# Patient Record
Sex: FEMALE | Race: WHITE | NOT HISPANIC OR LATINO | Employment: STUDENT | URBAN - METROPOLITAN AREA
[De-identification: names, ages, dates, MRNs, and addresses within clinical notes are randomized per-mention and may not be internally consistent; named-entity substitution may affect disease eponyms.]

---
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
## 2019-01-19 ENCOUNTER — OFFICE VISIT (OUTPATIENT)
Dept: FAMILY MEDICINE | Facility: CLINIC | Age: 19
End: 2019-01-19
Payer: COMMERCIAL

## 2019-01-19 DIAGNOSIS — K29.70 GASTRITIS WITHOUT BLEEDING, UNSPECIFIED CHRONICITY, UNSPECIFIED GASTRITIS TYPE: Primary | ICD-10-CM

## 2019-01-19 RX ORDER — OMEPRAZOLE 40 MG/1
40 CAPSULE, DELAYED RELEASE ORAL 2 TIMES DAILY
Qty: 62 CAPSULE | Refills: 0 | Status: SHIPPED | OUTPATIENT
Start: 2019-01-19 | End: 2019-05-11

## 2019-01-19 NOTE — LETTER
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  She states that she has not had this before, but it started during this game.  It seems to come in waves.  It is very painful when it is happening.  She thinks it might be related to stress as her anxiety has been much higher recently due to issues with her significant other who is not currently in [place] but rather in [place] and having mental health issues of her own.  Lc reports that the apartment she lives in is rather noisy at times with dogs and kids and some other issues and that is disrupting her sleep some too.  She has been taking NSAIDs pretty steadily since the late fall due to a problem with her shoulder.  She has noticed some heartburn and general upsetness to her stomach.  She has not had any vomiting.  No blood in her stools.  This is the first time she has had abdominal pain.  She has never had an ulcer or gastritis, GERD.  She denies any problems with shortness of breath.  She has had no change in her bowel habits.  She ate typical pregame food today and yesterday.  In between these waves of discomfort, she feels perfectly fine.  She denies any fevers or chills.  She does not feel sick otherwise.  Does have a significant history of generalized anxiety disorder.  She was getting treatment with a psychologist at [facility].   She has not been there in a while since they felt like she was doing well.  She has not been interested in taking any medications.  She and another ingrid are sharing time.  There is not one clear starting goalie.  She was somewhat upset since the other goalie started this important game against [place] today.  That goalnohelia performed very well, and we were able to win the game today versus yesterday's game where Lc played and we  lost the game.      OBJECTIVE:   GENERAL:  Pleasant in no apparent distress, somewhat anxious at times.   HEENT:  Negative.   NECK:  No lymphadenopathy.   LUNGS:  Clear to auscultation.   CARDIOVASCULAR:  Regular rate and rhythm.   ABDOMEN:  Soft.  There is a mild tenderness to palpation in the epigastric and left upper quadrant region.  There is no guarding or rebound.  There is no CVA tenderness.      ASSESSMENT:  Probable gastritis versus peptic ulcer disease.        PLAN:  At this time, I have asked Lc to stop all anti-inflammatories.  She assures me she is not drinking any alcohol.  She understands to avoid any spicy foods or citrusy foods or things that may increase her symptoms.  We will start her on omeprazole 40 mg b.i.d.  We will see if this is helping her.  I warned her of any signs of a GI bleed and told her to report this to Stefania Mondragon ATC, and likely we would have her go right to the emergency room.  She has some Tums at home which she will try as well.  We will see how she is doing later next week.  We briefly discussed her anxiety and stress management.  I told her that we should check in with her potentially about this as well at her followup from her belly pain.  Stefania Mondragon ATC, was present for a portion of the appointment.       Maria Fernanda Abreu MD, CAQ, CCD  [facility]  Sports Medicine and Bone Health      Maria Fernanda Abreu MD

## 2019-01-19 NOTE — LETTER
Date:January 24, 2019      Patient was self referred, no letter generated. Do not send.        [facility] Health Information

## 2019-01-22 NOTE — PROGRESS NOTES
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  She states that she has not had this before, but it started during this game.  It seems to come in waves.  It is very painful when it is happening.  She thinks it might be related to stress as her anxiety has been much higher recently due to issues with her significant other who is not currently in [place] but rather in [place] and having mental health issues of her own.  Lc reports that the apartment she lives in is rather noisy at times with dogs and kids and some other issues and that is disrupting her sleep some too.  She has been taking NSAIDs pretty steadily since the late fall due to a problem with her shoulder.  She has noticed some heartburn and general upsetness to her stomach.  She has not had any vomiting.  No blood in her stools.  This is the first time she has had abdominal pain.  She has never had an ulcer or gastritis, GERD.  She denies any problems with shortness of breath.  She has had no change in her bowel habits.  She ate typical pregame food today and yesterday.  In between these waves of discomfort, she feels perfectly fine.  She denies any fevers or chills.  She does not feel sick otherwise.  Does have a significant history of generalized anxiety disorder.  She was getting treatment with a psychologist at [facility].   She has not been there in a while since they felt like she was doing well.  She has not been interested in taking any medications.  She and another ingrid are sharing time.  There is not one clear starting goalie.  She was somewhat upset since the other goalie started this important game against [place] today.  That goalnohelia performed very well, and we were able to win the game today versus yesterday's game where Lc played and we lost the game.      OBJECTIVE:   GENERAL:  Pleasant in no apparent distress, somewhat anxious  at times.   HEENT:  Negative.   NECK:  No lymphadenopathy.   LUNGS:  Clear to auscultation.   CARDIOVASCULAR:  Regular rate and rhythm.   ABDOMEN:  Soft.  There is a mild tenderness to palpation in the epigastric and left upper quadrant region.  There is no guarding or rebound.  There is no CVA tenderness.      ASSESSMENT:  Probable gastritis versus peptic ulcer disease.        PLAN:  At this time, I have asked Lc to stop all anti-inflammatories.  She assures me she is not drinking any alcohol.  She understands to avoid any spicy foods or citrusy foods or things that may increase her symptoms.  We will start her on omeprazole 40 mg b.i.d.  We will see if this is helping her.  I warned her of any signs of a GI bleed and told her to report this to Stefania Mondragon ATC, and likely we would have her go right to the emergency room.  She has some Tums at home which she will try as well.  We will see how she is doing later next week.  We briefly discussed her anxiety and stress management.  I told her that we should check in with her potentially about this as well at her followup from her belly pain.  Stefania Mondragon ATC, was present for a portion of the appointment.       Maria Fernanda Abreu MD, CAQ, CCD  [facility]  Sports Medicine and Bone Health

## 2019-01-23 DIAGNOSIS — M25.511 RIGHT SHOULDER PAIN, UNSPECIFIED CHRONICITY: Primary | ICD-10-CM

## 2019-02-01 ENCOUNTER — OFFICE VISIT (OUTPATIENT)
Dept: FAMILY MEDICINE | Facility: CLINIC | Age: 19
End: 2019-02-01
Payer: COMMERCIAL

## 2019-02-01 VITALS
WEIGHT: 176 LBS | DIASTOLIC BLOOD PRESSURE: 66 MMHG | HEART RATE: 74 BPM | HEIGHT: 68 IN | SYSTOLIC BLOOD PRESSURE: 105 MMHG | BODY MASS INDEX: 26.67 KG/M2

## 2019-02-01 DIAGNOSIS — S43.101S AC SEPARATION, RIGHT, SEQUELA: Primary | ICD-10-CM

## 2019-02-01 ASSESSMENT — MIFFLIN-ST. JEOR: SCORE: 1621.83

## 2019-02-01 NOTE — LETTER
Date:February 4, 2019      Patient was self referred, no letter generated. Do not send.        [facility] Health Information

## 2019-02-01 NOTE — LETTER
"  2/1/2019      RE: Shiloh Cheatham  [address]  [address]  [facility] [address]       HISTORY OF PRESENT ILLNESS  Ms. Cheatham is a pleasant 18 year old year old female who presents to clinic today with right AC joint separation  Shiloh explains that this has happened several times this season and is unable to take nsaids due to gastric distress  Location: right AC joint  Quality:  achy pain    Severity: 5/10 at worst    Duration: months  Timing: occurs intermittently    Modifying factors:  resting and non-use makes it better, movement and use makes it worse  Associated signs & symptoms: some swelling, worse in the morning  Previous similar pain: yes    Additional history: as documented    MEDICAL HISTORY  There is no problem list on file for this patient.      Current Outpatient Medications   Medication Sig Dispense Refill     diclofenac (VOLTAREN) 1 % topical gel Apply 2 grams to shoulder up to 4 times daily 100 g 0     omeprazole (PRILOSEC) 40 MG DR capsule Take 1 capsule (40 mg) by mouth 2 times daily 62 capsule 0     diclofenac (VOLTAREN) 75 MG EC tablet Take 1 tablet (75 mg) by mouth 2 times daily as needed for moderate pain (Patient not taking: Reported on 12/8/2017) 30 tablet 1     sertraline (ZOLOFT) 50 MG tablet Take 1 tablet (50 mg) by mouth daily (Patient not taking: Reported on 12/8/2017) 31 tablet 1       No Known Allergies    No family history on file.    Additional medical/Social/Surgical histories reviewed in [facility] and updated as appropriate.     REVIEW OF SYSTEMS (2/1/2019)  10 point ROS of systems including Constitutional, Eyes, Respiratory, Cardiovascular, Gastroenterology, Genitourinary, Integumentary, Musculoskeletal, Psychiatric were all negative except for pertinent positives noted in my HPI.     PHYSICAL EXAM  Vitals:    02/01/19 0830   BP: 105/66   Pulse: 74   Weight: 79.8 kg (176 lb)   Height: 1.727 m (5' 8\")     Vital Signs: /66   Pulse 74   Ht 1.727 m (5' 8\")   Wt 79.8 kg " (176 lb)   LMP 01/10/2019   BMI 26.76 kg/m    Patient declined being weighed. Body mass index is 26.76 kg/m .    General  - normal appearance, in no obvious distress  CV  - normal radial pulse  Pulm  - normal respiratory pattern, non-labored  Musculoskeletal - right shoulder  - inspection: normal bone and joint alignment, no obvious deformity, no scapular winging, no AC step-off, mild swelling over AC joint, normal clavicle  - palpation: tender over AC joint, clavicle does not displace when pressed  - ROM:  painful passive flexion past 90 deg, painful adduction  - strength: 5/5  strength, 5/5 in all shoulder planes  - special tests:  (+) crossarm  (-) Speed's  (-) Neer  (-) Hawkin's  (-) Mark  (-) McLeod's  (-) apprehension  Neuro  - no sensory or motor deficit, grossly normal coordination, normal muscle tone  Skin  - no ecchymosis, erythema, warmth, or induration, no obvious rash  Psych  - interactive, appropriate, normal mood and affect    ASSESSMENT & PLAN  19 yo female with right AC separation  Reviewed ultrasound: clavicle displaced about 0.5cm superiorly  Will consider lidocaine injection, and will decide if she wants to do for tomorrow prior to game  Cont. voltaren gel and lidocaine patches PRN  Discussed with ATC and Dr Sebastian Ochoa MD, [facility]    José Miguel Ochoa MD

## 2019-02-01 NOTE — PROGRESS NOTES
"HISTORY OF PRESENT ILLNESS  Ms. Cheatham is a pleasant 18 year old year old female who presents to clinic today with right AC joint separation  Shiloh explains that this has happened several times this season and is unable to take nsaids due to gastric distress  Location: right AC joint  Quality:  achy pain    Severity: 5/10 at worst    Duration: months  Timing: occurs intermittently    Modifying factors:  resting and non-use makes it better, movement and use makes it worse  Associated signs & symptoms: some swelling, worse in the morning  Previous similar pain: yes    Additional history: as documented    MEDICAL HISTORY  There is no problem list on file for this patient.      Current Outpatient Medications   Medication Sig Dispense Refill     diclofenac (VOLTAREN) 1 % topical gel Apply 2 grams to shoulder up to 4 times daily 100 g 0     omeprazole (PRILOSEC) 40 MG DR capsule Take 1 capsule (40 mg) by mouth 2 times daily 62 capsule 0     diclofenac (VOLTAREN) 75 MG EC tablet Take 1 tablet (75 mg) by mouth 2 times daily as needed for moderate pain (Patient not taking: Reported on 12/8/2017) 30 tablet 1     sertraline (ZOLOFT) 50 MG tablet Take 1 tablet (50 mg) by mouth daily (Patient not taking: Reported on 12/8/2017) 31 tablet 1       No Known Allergies    No family history on file.    Additional medical/Social/Surgical histories reviewed in [facility] and updated as appropriate.     REVIEW OF SYSTEMS (2/1/2019)  10 point ROS of systems including Constitutional, Eyes, Respiratory, Cardiovascular, Gastroenterology, Genitourinary, Integumentary, Musculoskeletal, Psychiatric were all negative except for pertinent positives noted in my HPI.     PHYSICAL EXAM  Vitals:    02/01/19 0830   BP: 105/66   Pulse: 74   Weight: 79.8 kg (176 lb)   Height: 1.727 m (5' 8\")     Vital Signs: /66   Pulse 74   Ht 1.727 m (5' 8\")   Wt 79.8 kg (176 lb)   LMP 01/10/2019   BMI 26.76 kg/m   Patient declined being weighed. Body mass " index is 26.76 kg/m .    General  - normal appearance, in no obvious distress  CV  - normal radial pulse  Pulm  - normal respiratory pattern, non-labored  Musculoskeletal - right shoulder  - inspection: normal bone and joint alignment, no obvious deformity, no scapular winging, no AC step-off, mild swelling over AC joint, normal clavicle  - palpation: tender over AC joint, clavicle does not displace when pressed  - ROM:  painful passive flexion past 90 deg, painful adduction  - strength: 5/5  strength, 5/5 in all shoulder planes  - special tests:  (+) crossarm  (-) Speed's  (-) Neer  (-) Hawkin's  (-) Mark  (-) Norton's  (-) apprehension  Neuro  - no sensory or motor deficit, grossly normal coordination, normal muscle tone  Skin  - no ecchymosis, erythema, warmth, or induration, no obvious rash  Psych  - interactive, appropriate, normal mood and affect    ASSESSMENT & PLAN  17 yo female with right AC separation  Reviewed ultrasound: clavicle displaced about 0.5cm superiorly  Will consider lidocaine injection, and will decide if she wants to do for tomorrow prior to game  Cont. voltaren gel and lidocaine patches PRN  Discussed with ATC and Dr Sebastian Ochoa MD, CAQSM

## 2019-02-02 ENCOUNTER — OFFICE VISIT (OUTPATIENT)
Dept: ORTHOPEDICS | Facility: CLINIC | Age: 19
End: 2019-02-02
Payer: COMMERCIAL

## 2019-02-02 DIAGNOSIS — M25.511 RIGHT SHOULDER PAIN, UNSPECIFIED CHRONICITY: Primary | ICD-10-CM

## 2019-02-02 NOTE — LETTER
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 She has had persistent right shoulder pain through the last year which has been intermittent in its severity. She has had no further ned instability events - pain is the primary concern. She has had AC injuries over the years and that has been a painful site also of late.      PHYSICAL EXAMINATION:    Young adult female, alert and oriented, in no apparent distress. Seen with her ATC today.    Focused upper extremity exam:  No swelling. Mild prominence at R AC joint. Active right shoulder motion is FE to 175, ER at side to 70 and IR to T12 (with pain). Strength is 5/5 in FE and ER but with pain. Sens intact Ax/Med/Rad/Uln nerves. Motor intact to EPL, FPL, and Intrinsics. Sulcus 1+. Positive Speeds and Positive O'Briens (most significant pain with these tests). Tenderness to palpation over AC joint and over long head biceps groove.     IMAGING:  None new    IMPRESSION:    1. Right shoulder pain  2. Prior known right posterior labral tear  3. Right long head biceps symptoms  4. Right AC joint pain     PLAN:   I discussed with Lc and her ATC her symptoms and her prior MRI findings. Lc has continued to feel her shoulder was best managed non-surgically and would like to continue in this line of treatment through this season. We discussed that we would obtain an updated MRI to guide decision making post-season. We discussed U/S guided cortisone injection for GH+/- AC joints this coming Monday.      Adina Cortes MD

## 2019-02-02 NOTE — LETTER
Date:February 4, 2019      Patient was self referred, no letter generated. Do not send.        [facility] Health Information

## 2019-02-03 NOTE — PROGRESS NOTES
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 She has had persistent right shoulder pain through the last year which has been intermittent in its severity. She has had no further ned instability events - pain is the primary concern. She has had AC injuries over the years and that has been a painful site also of late.      PHYSICAL EXAMINATION:    Young adult female, alert and oriented, in no apparent distress. Seen with her ATC today.    Focused upper extremity exam:  No swelling. Mild prominence at R AC joint. Active right shoulder motion is FE to 175, ER at side to 70 and IR to T12 (with pain). Strength is 5/5 in FE and ER but with pain. Sens intact Ax/Med/Rad/Uln nerves. Motor intact to EPL, FPL, and Intrinsics. Sulcus 1+. Positive Speeds and Positive O'Briens (most significant pain with these tests). Tenderness to palpation over AC joint and over long head biceps groove.     IMAGING:  None new    IMPRESSION:    1. Right shoulder pain  2. Prior known right posterior labral tear  3. Right long head biceps symptoms  4. Right AC joint pain     PLAN:   I discussed with Lc and her ATC her symptoms and her prior MRI findings. Lc has continued to feel her shoulder was best managed non-surgically and would like to continue in this line of treatment through this season. We discussed that we would obtain an updated MRI to guide decision making post-season. We discussed U/S guided cortisone injection for GH+/- AC joints this coming Monday.

## 2019-02-04 ENCOUNTER — OFFICE VISIT (OUTPATIENT)
Dept: FAMILY MEDICINE | Facility: CLINIC | Age: 19
End: 2019-02-04
Payer: COMMERCIAL

## 2019-02-04 VITALS
WEIGHT: 174 LBS | HEART RATE: 80 BPM | SYSTOLIC BLOOD PRESSURE: 121 MMHG | BODY MASS INDEX: 26.37 KG/M2 | DIASTOLIC BLOOD PRESSURE: 76 MMHG | HEIGHT: 68 IN

## 2019-02-04 DIAGNOSIS — M19.011 ARTHROSIS OF RIGHT ACROMIOCLAVICULAR JOINT: Primary | ICD-10-CM

## 2019-02-04 DIAGNOSIS — S43.431S LABRAL TEAR OF SHOULDER, RIGHT, SEQUELA: ICD-10-CM

## 2019-02-04 ASSESSMENT — MIFFLIN-ST. JEOR: SCORE: 1612.63

## 2019-02-04 NOTE — LETTER
Date:February 7, 2019      Patient was self referred, no letter generated. Do not send.        [facility] Health Information

## 2019-02-04 NOTE — PROGRESS NOTES
Shiloh just returned for injections for her GH and AC joints. Will split the concentration and difference between both.    PROCEDURE: right SHOULDER joint injection in AC joint AND Glenohumeral joint  NDC 2774-7276-15 kenalog     The patient was apprised of the risks and the benefits of the procedure and consented and a written consent was signed by the patient.   The patient s shoulder was sterilely prepped with chloraprep   40 mg of triamcinolone suspension was drawn up into a 5 mL syringe with 5 mL of 1% lidocaine   The patient was injected with 3mL of the above solution with a 1.5-inch 22-gauge needle at right posterior gleno-humeral joint using ultrasound, and also then injected into the right AC joint with the other 3mL of the above solution  There were no complications. The patient tolerated the procedure well. There was minimal bleeding.   The patient was instructed to ice the shoulder upon leaving clinic and refrain from overuse over the next 3 days.   The patient was instructed to go to the emergency room with any usual pain, swelling, or redness occurred in the injected area.   The patient was given a followup appointment to evaluate response to the injection to their increased range of motion and reduction of pain.    followup PRN  Dr José Miguel Ochoa

## 2019-02-04 NOTE — LETTER
2/4/2019      RE: Shiloh Cheatham  [address]  [address]  [facility] [address]       Shiloh just returned for injections for her GH and AC joints. Will split the concentration and difference between both.    PROCEDURE: right SHOULDER joint injection in AC joint AND Glenohumeral joint  NDC 5102-0776-82 kenalog     The patient was apprised of the risks and the benefits of the procedure and consented and a written consent was signed by the patient.   The patient s shoulder was sterilely prepped with chloraprep   40 mg of triamcinolone suspension was drawn up into a 5 mL syringe with 5 mL of 1% lidocaine   The patient was injected with 3mL of the above solution with a 1.5-inch 22-gauge needle at right posterior gleno-humeral joint using ultrasound, and also then injected into the right AC joint with the other 3mL of the above solution  There were no complications. The patient tolerated the procedure well. There was minimal bleeding.   The patient was instructed to ice the shoulder upon leaving clinic and refrain from overuse over the next 3 days.   The patient was instructed to go to the emergency room with any usual pain, swelling, or redness occurred in the injected area.   The patient was given a followup appointment to evaluate response to the injection to their increased range of motion and reduction of pain.    followup PRN  Dr José Miguel Ochoa MD

## 2019-02-06 RX ORDER — TRIAMCINOLONE ACETONIDE 40 MG/ML
40 INJECTION, SUSPENSION INTRA-ARTICULAR; INTRAMUSCULAR ONCE
Qty: 1 ML | Refills: 0 | OUTPATIENT
Start: 2019-02-06 | End: 2019-03-29

## 2019-02-18 ENCOUNTER — ANCILLARY PROCEDURE (OUTPATIENT)
Dept: MRI IMAGING | Facility: CLINIC | Age: 19
End: 2019-02-18
Attending: ORTHOPAEDIC SURGERY
Payer: COMMERCIAL

## 2019-02-18 DIAGNOSIS — M25.511 RIGHT SHOULDER PAIN, UNSPECIFIED CHRONICITY: ICD-10-CM

## 2019-02-25 ENCOUNTER — OFFICE VISIT (OUTPATIENT)
Dept: FAMILY MEDICINE | Facility: CLINIC | Age: 19
End: 2019-02-25
Payer: COMMERCIAL

## 2019-02-25 VITALS
SYSTOLIC BLOOD PRESSURE: 115 MMHG | HEART RATE: 96 BPM | DIASTOLIC BLOOD PRESSURE: 72 MMHG | HEIGHT: 68 IN | BODY MASS INDEX: 26.92 KG/M2 | WEIGHT: 177.6 LBS

## 2019-02-25 DIAGNOSIS — S23.41XA SPRAIN OF COSTAL CARTILAGE, INITIAL ENCOUNTER: Primary | ICD-10-CM

## 2019-02-25 ASSESSMENT — MIFFLIN-ST. JEOR: SCORE: 1629.09

## 2019-02-25 NOTE — LETTER
Date:March 26, 2019      Patient was self referred, no letter generated. Do not send.        [facility] Health Information

## 2019-02-25 NOTE — LETTER
"  2/25/2019      RE: Shiloh Cheatham  [address]  [address]  [facility] [address]       HISTORY OF PRESENT ILLNESS  Ms. Cheatham is a pleasant 18 year old year old female who presents to clinic today left rib pain  Did not have a specific injury, may have strained during lifting  Has slowly gotten better over the last 5 days  No bruising  No pain with breathing      MEDICAL HISTORY  There is no problem list on file for this patient.      Current Outpatient Medications   Medication Sig Dispense Refill     diclofenac (VOLTAREN) 1 % topical gel Apply 2 grams to shoulder up to 4 times daily (Patient not taking: Reported on 2/25/2019) 100 g 0     diclofenac (VOLTAREN) 75 MG EC tablet Take 1 tablet (75 mg) by mouth 2 times daily as needed for moderate pain (Patient not taking: Reported on 12/8/2017) 30 tablet 1     omeprazole (PRILOSEC) 40 MG DR capsule Take 1 capsule (40 mg) by mouth 2 times daily (Patient not taking: Reported on 2/25/2019) 62 capsule 0     sertraline (ZOLOFT) 50 MG tablet Take 1 tablet (50 mg) by mouth daily (Patient not taking: Reported on 12/8/2017) 31 tablet 1       No Known Allergies    No family history on file.    Additional medical/Social/Surgical histories reviewed in [facility] and updated as appropriate.     REVIEW OF SYSTEMS (3/25/2019)  10 point ROS of systems including Constitutional, Eyes, Respiratory, Cardiovascular, Gastroenterology, Genitourinary, Integumentary, Musculoskeletal, Psychiatric were all negative except for pertinent positives noted in my HPI.     PHYSICAL EXAM  Vitals:    02/25/19 1611   BP: 115/72   Pulse: 96   Weight: 80.6 kg (177 lb 9.6 oz)   Height: 1.727 m (5' 8\")     Vital Signs: /72   Pulse 96   Ht 1.727 m (5' 8\")   Wt 80.6 kg (177 lb 9.6 oz)   BMI 27.00 kg/m    Patient declined being weighed. Body mass index is 27 kg/m .    General  - normal appearance, in no obvious distress  CV  - normal radial pulse  Pulm  - normal respiratory pattern, " non-labored  Musculoskeletal - left ribs  - inspection: normal bone and joint alignment, no obvious deformity, no scapular winging, no AC step-off  - palpation: no bony or soft tissue tenderness, except along musculature of left lower ribs  - ROM: full rotation ROM with torso, with some discomfort in left lower rib area  - strength: 5/5  strength, 5/5 in all shoulder planes    Neuro  - no sensory or motor deficit, grossly normal coordination, normal muscle tone  Skin  - no ecchymosis, erythema, warmth, or induration, no obvious rash  Psych  - interactive, appropriate, normal mood and affect      ASSESSMENT & PLAN  17 yo female with left rib sprain, improving  nsaids and ice PRN  Rehab given  F/u as needed  Will consider imaging if not improving      José Miguel Ochoa MD, CAQSM    José Miguel Ochoa MD

## 2019-02-28 ENCOUNTER — TELEPHONE (OUTPATIENT)
Dept: ORTHOPEDICS | Facility: CLINIC | Age: 19
End: 2019-02-28

## 2019-02-28 DIAGNOSIS — S43.431D TEAR OF RIGHT GLENOID LABRUM, SUBSEQUENT ENCOUNTER: Primary | ICD-10-CM

## 2019-02-28 NOTE — TELEPHONE ENCOUNTER
Patient is scheduled for surgery with Dr. Cortes      Spoke or left message with: Spoke with Stefania    Date of Surgery: 3/29/19    Location: [facility]    Informed patient they will need an adult  Yes    H&P: Patient to schedule with PCP    Additional imaging/appointments: N/A    Surgery packet: Given in clinic    Additional comments: Patient will await pre admission phone call 1-2 days prior to surgery for arrival time.

## 2019-03-25 ENCOUNTER — OFFICE VISIT (OUTPATIENT)
Dept: FAMILY MEDICINE | Facility: CLINIC | Age: 19
End: 2019-03-25
Payer: COMMERCIAL

## 2019-03-25 VITALS
HEIGHT: 68 IN | SYSTOLIC BLOOD PRESSURE: 118 MMHG | DIASTOLIC BLOOD PRESSURE: 72 MMHG | HEART RATE: 74 BPM | BODY MASS INDEX: 27.04 KG/M2 | WEIGHT: 178.4 LBS

## 2019-03-25 DIAGNOSIS — S43.431S LABRAL TEAR OF SHOULDER, RIGHT, SEQUELA: Primary | ICD-10-CM

## 2019-03-25 DIAGNOSIS — Z01.818 PRE-OP EXAM: ICD-10-CM

## 2019-03-25 ASSESSMENT — MIFFLIN-ST. JEOR: SCORE: 1632.72

## 2019-03-25 NOTE — PROGRESS NOTES
NAME: Shiloh Cheatham      AGE: 18 year old      SEX: female  Chief Complaint/Reason for Procedure: right shoulder labral tear     Indications:  Surgeon:  Sebastian  Date of Surgery: 3/29/19  Location: right shoudler labral tear/biceps tendinitis    HPI: right shoulder labral tear/pain x 2 months    PAST HISTORY  No past surgical history on file.    No past medical history on file.    MEDICATIONS:  Current Outpatient Medications   Medication Sig Dispense Refill     diclofenac (VOLTAREN) 1 % topical gel Apply 2 grams to shoulder up to 4 times daily (Patient not taking: Reported on 2/25/2019) 100 g 0     diclofenac (VOLTAREN) 75 MG EC tablet Take 1 tablet (75 mg) by mouth 2 times daily as needed for moderate pain (Patient not taking: Reported on 12/8/2017) 30 tablet 1     omeprazole (PRILOSEC) 40 MG DR capsule Take 1 capsule (40 mg) by mouth 2 times daily (Patient not taking: Reported on 2/25/2019) 62 capsule 0     sertraline (ZOLOFT) 50 MG tablet Take 1 tablet (50 mg) by mouth daily (Patient not taking: Reported on 12/8/2017) 31 tablet 1       ALLERGIES  Patient has no known allergies.    SOCIAL HISTORY  Social History     Tobacco Use     Smoking status: Never Smoker   Substance Use Topics     Alcohol use: No     Drug use: No       FAMILY HISTORY  No family history on file.    Family History of Anesthesia Reaction: No  Family History of Bleeding Disorder:  No    REVIEW OF SYSTEMS  CONSTITUTIONAL: NEGATIVE for fever, chills, change in weight  EYES: NEGATIVE for vision changes or irritation  ENT/MOUTH: NEGATIVE for ear, mouth and throat problems  ENT/MOUTH: NEGATIVE for ear, mouth and throat problems  RESP: NEGATIVE for significant cough or SOB  RESP:NEGATIVE for significant cough or SOB  CV: NEGATIVE for chest pain, palpitations or peripheral edema  GI: NEGATIVE for nausea, abdominal pain, heartburn, or change in bowel habits  NEURO: NEGATIVE for weakness, dizziness or paresthesias  ENDOCRINE: NEGATIVE for temperature  "intolerance, skin/hair changes    PHYSICAL EXAM  /72   Pulse 74   Ht 1.727 m (5' 8\")   Wt 80.9 kg (178 lb 6.4 oz)   BMI 27.13 kg/m    General Appearance: Normal  Skin:  Normal  Head:  Normal  Eyes: Normal  Ears: Normal  Nose: Normal  Mouth/Teeth: Normal  Throat: Normal  Neck: Normal  Chest/Lungs: Normal  Heart/Vascular: Normal  Abdomen: Normal  Lymphoid: Normal  Blood Vessels: Normal  Neuromuscular: Normal  Other: Normal      Assessment: 19 yo female with right shoulder labral tear, biceps tendinitis    PLAN  Cleared for anesthesia    This patient has been examined by me this day and has been found to be an acceptable candidate for surgery with apppropriate anesthesia.  José Miguel Ochoa   3/25/2019        "

## 2019-03-25 NOTE — LETTER
Date:March 26, 2019      Patient was self referred, no letter generated. Do not send.        [facility] Health Information

## 2019-03-25 NOTE — LETTER
3/25/2019      RE: Shiloh Cheatham  [address]  [address]  [facility] [address]       NAME: Shiloh Cheatham      AGE: 18 year old      SEX: female  Chief Complaint/Reason for Procedure: right shoulder labral tear     Indications:  Surgeon:  Sebastian  Date of Surgery: 3/29/19  Location: right shoudler labral tear/biceps tendinitis    HPI: right shoulder labral tear/pain x 2 months    PAST HISTORY  No past surgical history on file.    No past medical history on file.    MEDICATIONS:  Current Outpatient Medications   Medication Sig Dispense Refill     diclofenac (VOLTAREN) 1 % topical gel Apply 2 grams to shoulder up to 4 times daily (Patient not taking: Reported on 2/25/2019) 100 g 0     diclofenac (VOLTAREN) 75 MG EC tablet Take 1 tablet (75 mg) by mouth 2 times daily as needed for moderate pain (Patient not taking: Reported on 12/8/2017) 30 tablet 1     omeprazole (PRILOSEC) 40 MG DR capsule Take 1 capsule (40 mg) by mouth 2 times daily (Patient not taking: Reported on 2/25/2019) 62 capsule 0     sertraline (ZOLOFT) 50 MG tablet Take 1 tablet (50 mg) by mouth daily (Patient not taking: Reported on 12/8/2017) 31 tablet 1       ALLERGIES  Patient has no known allergies.    SOCIAL HISTORY  Social History     Tobacco Use     Smoking status: Never Smoker   Substance Use Topics     Alcohol use: No     Drug use: No       FAMILY HISTORY  No family history on file.    Family History of Anesthesia Reaction: No  Family History of Bleeding Disorder:  No    REVIEW OF SYSTEMS  CONSTITUTIONAL: NEGATIVE for fever, chills, change in weight  EYES: NEGATIVE for vision changes or irritation  ENT/MOUTH: NEGATIVE for ear, mouth and throat problems  ENT/MOUTH: NEGATIVE for ear, mouth and throat problems  RESP: NEGATIVE for significant cough or SOB  RESP:NEGATIVE for significant cough or SOB  CV: NEGATIVE for chest pain, palpitations or peripheral edema  GI: NEGATIVE for nausea, abdominal pain, heartburn, or change in bowel  "habits  NEURO: NEGATIVE for weakness, dizziness or paresthesias  ENDOCRINE: NEGATIVE for temperature intolerance, skin/hair changes    PHYSICAL EXAM  /72   Pulse 74   Ht 1.727 m (5' 8\")   Wt 80.9 kg (178 lb 6.4 oz)   BMI 27.13 kg/m     General Appearance: Normal  Skin:  Normal  Head:  Normal  Eyes: Normal  Ears: Normal  Nose: Normal  Mouth/Teeth: Normal  Throat: Normal  Neck: Normal  Chest/Lungs: Normal  Heart/Vascular: Normal  Abdomen: Normal  Lymphoid: Normal  Blood Vessels: Normal  Neuromuscular: Normal  Other: Normal      Assessment: 17 yo female with right shoulder labral tear, biceps tendinitis    PLAN  Cleared for anesthesia    This patient has been examined by me this day and has been found to be an acceptable candidate for surgery with apppropriate anesthesia.  José Miguel Ochoa   3/25/2019          José Miguel Ochoa MD    "

## 2019-03-25 NOTE — PROGRESS NOTES
"HISTORY OF PRESENT ILLNESS  Ms. Cheatham is a pleasant 18 year old year old female who presents to clinic today left rib pain  Did not have a specific injury, may have strained during lifting  Has slowly gotten better over the last 5 days  No bruising  No pain with breathing      MEDICAL HISTORY  There is no problem list on file for this patient.      Current Outpatient Medications   Medication Sig Dispense Refill     diclofenac (VOLTAREN) 1 % topical gel Apply 2 grams to shoulder up to 4 times daily (Patient not taking: Reported on 2/25/2019) 100 g 0     diclofenac (VOLTAREN) 75 MG EC tablet Take 1 tablet (75 mg) by mouth 2 times daily as needed for moderate pain (Patient not taking: Reported on 12/8/2017) 30 tablet 1     omeprazole (PRILOSEC) 40 MG DR capsule Take 1 capsule (40 mg) by mouth 2 times daily (Patient not taking: Reported on 2/25/2019) 62 capsule 0     sertraline (ZOLOFT) 50 MG tablet Take 1 tablet (50 mg) by mouth daily (Patient not taking: Reported on 12/8/2017) 31 tablet 1       No Known Allergies    No family history on file.    Additional medical/Social/Surgical histories reviewed in [employer] and updated as appropriate.     REVIEW OF SYSTEMS (3/25/2019)  10 point ROS of systems including Constitutional, Eyes, Respiratory, Cardiovascular, Gastroenterology, Genitourinary, Integumentary, Musculoskeletal, Psychiatric were all negative except for pertinent positives noted in my HPI.     PHYSICAL EXAM  Vitals:    02/25/19 1611   BP: 115/72   Pulse: 96   Weight: 80.6 kg (177 lb 9.6 oz)   Height: 1.727 m (5' 8\")     Vital Signs: /72   Pulse 96   Ht 1.727 m (5' 8\")   Wt 80.6 kg (177 lb 9.6 oz)   BMI 27.00 kg/m   Patient declined being weighed. Body mass index is 27 kg/m .    General  - normal appearance, in no obvious distress  CV  - normal radial pulse  Pulm  - normal respiratory pattern, non-labored  Musculoskeletal - left ribs  - inspection: normal bone and joint alignment, no obvious deformity, " no scapular winging, no AC step-off  - palpation: no bony or soft tissue tenderness, except along musculature of left lower ribs  - ROM: full rotation ROM with torso, with some discomfort in left lower rib area  - strength: 5/5  strength, 5/5 in all shoulder planes    Neuro  - no sensory or motor deficit, grossly normal coordination, normal muscle tone  Skin  - no ecchymosis, erythema, warmth, or induration, no obvious rash  Psych  - interactive, appropriate, normal mood and affect      ASSESSMENT & PLAN  17 yo female with left rib sprain, improving  nsaids and ice PRN  Rehab given  F/u as needed  Will consider imaging if not improving      José Miguel Ochoa MD, CAQSM

## 2019-03-28 ENCOUNTER — ANESTHESIA EVENT (OUTPATIENT)
Dept: SURGERY | Facility: AMBULATORY SURGERY CENTER | Age: 19
End: 2019-03-28

## 2019-03-28 ASSESSMENT — LIFESTYLE VARIABLES: TOBACCO_USE: 0

## 2019-03-28 NOTE — ANESTHESIA PREPROCEDURE EVALUATION
"Anesthesia Pre-Procedure Evaluation    Patient: Shiloh Cheatham   MRN:     [id number] Gender:   female   Age:    18 year old :      2000        Preoperative Diagnosis: Right Labral Tear   Procedure(s):  Right Shoulder Arthroscopic Labral Repair     No past medical history on file.   No past surgical history on file.       Anesthesia Evaluation     . Pt has not had prior anesthetic            ROS/MED HX    ENT/Pulmonary:  - neg pulmonary ROS    (-) tobacco use   Neurologic:  - neg neurologic ROS     Cardiovascular:  - neg cardiovascular ROS       METS/Exercise Tolerance:     Hematologic:  - neg hematologic  ROS       Musculoskeletal: Comment: Right Labral Tear        GI/Hepatic:  - neg GI/hepatic ROS       Renal/Genitourinary:  - ROS Renal section negative       Endo:  - neg endo ROS       Psychiatric:  - neg psychiatric ROS       Infectious Disease:  - neg infectious disease ROS       Malignancy:      - no malignancy   Other:    - neg other ROS                 JZG FV AN PHYSICAL EXAM    No results found for: WBC, HGB, HCT, PLT, CRP, SED, NA, POTASSIUM, CHLORIDE, CO2, BUN, CR, GLC, SOCRATES, PHOS, MAG, ALBUMIN, PROTTOTAL, ALT, AST, GGT, ALKPHOS, BILITOTAL, BILIDIRECT, LIPASE, AMYLASE, MATTIE, PTT, INR, FIBR, TSH, T4, T3, HCG, HCGS, CKTOTAL, CKMB, TROPN    Preop Vitals  BP Readings from Last 3 Encounters:   19 118/72   19 115/72   19 121/76    Pulse Readings from Last 3 Encounters:   19 74   19 96   19 80      Resp Readings from Last 3 Encounters:   17 16    SpO2 Readings from Last 3 Encounters:   No data found for SpO2      Temp Readings from Last 1 Encounters:   No data found for Temp    Ht Readings from Last 1 Encounters:   19 1.727 m (5' 8\") (93 %)*     * Growth percentiles are based on CDC (Girls, 2-20 Years) data.      Wt Readings from Last 1 Encounters:   19 80.9 kg (178 lb 6.4 oz) (94 %)*     * Growth percentiles are based on CDC (Girls, 2-20 Years) " "data.    Estimated body mass index is 27.13 kg/m  as calculated from the following:    Height as of 3/25/19: 1.727 m (5' 8\").    Weight as of 3/25/19: 80.9 kg (178 lb 6.4 oz).     LDA:            Assessment:   ASA SCORE: 1       Documentation: H&P complete; Preop Testing complete; Consents complete   Proceeding: Proceed without further delay  Tobacco Use:  NO Active use of Tobacco/UNKNOWN Tobacco use status     Plan:   Anes. Type:  Regional; MAC     RA Details:  Pre Induction; Exparel; SS; FOR POSTOP PAIN CONTROL; R   Pre-Induction: Midazolam IV; Opioid IV; Acetaminophen PO   Induction:  IV (Standard)   Airway: Native Airway   Access/Monitoring: PIV   Maintenance: Propofol; IV   Emergence: Procedure Site   Logistics: Same Day Surgery     Postop Pain/Sedation Strategy:  Standard-Options: Opioids PRN; Exparel; Block SS     PONV Management:  Adult Risk Factors: Female, Non-Smoker, Postop Opioids  Prevention: Propofol Infusion; Ondansetron     CONSENT:      Blood Products: Consent Deferred (Minimal Blood Loss)       Comments for Plan/Consent:  17 yo for Right Shoulder Arthroscopic Labral Repair (Right Shoulder) under MAC/RA                         Sonny Nam MD  "

## 2019-03-29 ENCOUNTER — HOSPITAL ENCOUNTER (OUTPATIENT)
Facility: AMBULATORY SURGERY CENTER | Age: 19
End: 2019-03-29
Attending: ORTHOPAEDIC SURGERY
Payer: COMMERCIAL

## 2019-03-29 ENCOUNTER — ANESTHESIA (OUTPATIENT)
Dept: SURGERY | Facility: AMBULATORY SURGERY CENTER | Age: 19
End: 2019-03-29

## 2019-03-29 VITALS
HEIGHT: 68 IN | OXYGEN SATURATION: 96 % | TEMPERATURE: 97.6 F | HEART RATE: 88 BPM | WEIGHT: 175 LBS | RESPIRATION RATE: 14 BRPM | BODY MASS INDEX: 26.52 KG/M2 | DIASTOLIC BLOOD PRESSURE: 73 MMHG | SYSTOLIC BLOOD PRESSURE: 126 MMHG

## 2019-03-29 DIAGNOSIS — S43.431A TEAR OF RIGHT GLENOID LABRUM, INITIAL ENCOUNTER: Primary | ICD-10-CM

## 2019-03-29 DEVICE — IMP SCR ARTHREX 2.4MM BIOCOMPOSITE SUTURETAK AR-1934BCF-24: Type: IMPLANTABLE DEVICE | Site: SHOULDER | Status: FUNCTIONAL

## 2019-03-29 DEVICE — IMP ANCHOR ARTHREX BIOCOMP PUSHLOCK 2.9X12.5MM AR-2923BC: Type: IMPLANTABLE DEVICE | Site: SHOULDER | Status: FUNCTIONAL

## 2019-03-29 RX ORDER — SODIUM CHLORIDE, SODIUM LACTATE, POTASSIUM CHLORIDE, CALCIUM CHLORIDE 600; 310; 30; 20 MG/100ML; MG/100ML; MG/100ML; MG/100ML
INJECTION, SOLUTION INTRAVENOUS CONTINUOUS
Status: DISCONTINUED | OUTPATIENT
Start: 2019-03-29 | End: 2019-03-30 | Stop reason: HOSPADM

## 2019-03-29 RX ORDER — GABAPENTIN 300 MG/1
300 CAPSULE ORAL ONCE
Status: COMPLETED | OUTPATIENT
Start: 2019-03-29 | End: 2019-03-29

## 2019-03-29 RX ORDER — FENTANYL CITRATE 50 UG/ML
25-50 INJECTION, SOLUTION INTRAMUSCULAR; INTRAVENOUS
Status: DISCONTINUED | OUTPATIENT
Start: 2019-03-29 | End: 2019-03-29 | Stop reason: HOSPADM

## 2019-03-29 RX ORDER — MEPERIDINE HYDROCHLORIDE 25 MG/ML
12.5 INJECTION INTRAMUSCULAR; INTRAVENOUS; SUBCUTANEOUS
Status: DISCONTINUED | OUTPATIENT
Start: 2019-03-29 | End: 2019-03-30 | Stop reason: HOSPADM

## 2019-03-29 RX ORDER — ONDANSETRON 4 MG/1
4 TABLET, ORALLY DISINTEGRATING ORAL EVERY 30 MIN PRN
Status: DISCONTINUED | OUTPATIENT
Start: 2019-03-29 | End: 2019-03-30 | Stop reason: HOSPADM

## 2019-03-29 RX ORDER — NALOXONE HYDROCHLORIDE 0.4 MG/ML
.1-.4 INJECTION, SOLUTION INTRAMUSCULAR; INTRAVENOUS; SUBCUTANEOUS
Status: DISCONTINUED | OUTPATIENT
Start: 2019-03-29 | End: 2019-03-30 | Stop reason: HOSPADM

## 2019-03-29 RX ORDER — LIDOCAINE 40 MG/G
CREAM TOPICAL
Status: DISCONTINUED | OUTPATIENT
Start: 2019-03-29 | End: 2019-03-29 | Stop reason: HOSPADM

## 2019-03-29 RX ORDER — KETOROLAC TROMETHAMINE 30 MG/ML
INJECTION, SOLUTION INTRAMUSCULAR; INTRAVENOUS PRN
Status: DISCONTINUED | OUTPATIENT
Start: 2019-03-29 | End: 2019-03-29

## 2019-03-29 RX ORDER — DEXAMETHASONE SODIUM PHOSPHATE 4 MG/ML
INJECTION, SOLUTION INTRA-ARTICULAR; INTRALESIONAL; INTRAMUSCULAR; INTRAVENOUS; SOFT TISSUE PRN
Status: DISCONTINUED | OUTPATIENT
Start: 2019-03-29 | End: 2019-03-29

## 2019-03-29 RX ORDER — ONDANSETRON 2 MG/ML
4 INJECTION INTRAMUSCULAR; INTRAVENOUS EVERY 30 MIN PRN
Status: DISCONTINUED | OUTPATIENT
Start: 2019-03-29 | End: 2019-03-30 | Stop reason: HOSPADM

## 2019-03-29 RX ORDER — ONDANSETRON 4 MG/1
4-8 TABLET, ORALLY DISINTEGRATING ORAL EVERY 8 HOURS PRN
Qty: 4 TABLET | Refills: 0 | Status: SHIPPED | OUTPATIENT
Start: 2019-03-29 | End: 2019-05-11

## 2019-03-29 RX ORDER — PROPOFOL 10 MG/ML
INJECTION, EMULSION INTRAVENOUS PRN
Status: DISCONTINUED | OUTPATIENT
Start: 2019-03-29 | End: 2019-03-29

## 2019-03-29 RX ORDER — FLUMAZENIL 0.1 MG/ML
0.2 INJECTION, SOLUTION INTRAVENOUS
Status: DISCONTINUED | OUTPATIENT
Start: 2019-03-29 | End: 2019-03-29 | Stop reason: HOSPADM

## 2019-03-29 RX ORDER — OXYCODONE HYDROCHLORIDE 5 MG/1
5 TABLET ORAL
Status: DISCONTINUED | OUTPATIENT
Start: 2019-03-29 | End: 2019-03-30 | Stop reason: HOSPADM

## 2019-03-29 RX ORDER — NALOXONE HYDROCHLORIDE 0.4 MG/ML
.1-.4 INJECTION, SOLUTION INTRAMUSCULAR; INTRAVENOUS; SUBCUTANEOUS
Status: DISCONTINUED | OUTPATIENT
Start: 2019-03-29 | End: 2019-03-29 | Stop reason: HOSPADM

## 2019-03-29 RX ORDER — SODIUM CHLORIDE, SODIUM LACTATE, POTASSIUM CHLORIDE, CALCIUM CHLORIDE 600; 310; 30; 20 MG/100ML; MG/100ML; MG/100ML; MG/100ML
INJECTION, SOLUTION INTRAVENOUS CONTINUOUS
Status: DISCONTINUED | OUTPATIENT
Start: 2019-03-29 | End: 2019-03-29 | Stop reason: HOSPADM

## 2019-03-29 RX ORDER — AMOXICILLIN 250 MG
1-2 CAPSULE ORAL 2 TIMES DAILY
Qty: 30 TABLET | Refills: 0 | Status: SHIPPED | OUTPATIENT
Start: 2019-03-29 | End: 2019-05-11

## 2019-03-29 RX ORDER — CEFAZOLIN SODIUM 1 G/50ML
1 SOLUTION INTRAVENOUS SEE ADMIN INSTRUCTIONS
Status: DISCONTINUED | OUTPATIENT
Start: 2019-03-29 | End: 2019-03-29 | Stop reason: HOSPADM

## 2019-03-29 RX ORDER — OXYCODONE HYDROCHLORIDE 5 MG/1
5-10 TABLET ORAL EVERY 4 HOURS PRN
Qty: 30 TABLET | Refills: 0 | Status: SHIPPED | OUTPATIENT
Start: 2019-03-29 | End: 2019-05-01

## 2019-03-29 RX ORDER — ACETAMINOPHEN 325 MG/1
650 TABLET ORAL EVERY 4 HOURS PRN
Qty: 50 TABLET | Refills: 0 | Status: SHIPPED | OUTPATIENT
Start: 2019-03-29 | End: 2023-02-01

## 2019-03-29 RX ORDER — ACETAMINOPHEN 325 MG/1
650 TABLET ORAL
Status: DISCONTINUED | OUTPATIENT
Start: 2019-03-29 | End: 2019-03-30 | Stop reason: HOSPADM

## 2019-03-29 RX ORDER — BUPIVACAINE HYDROCHLORIDE 5 MG/ML
INJECTION, SOLUTION EPIDURAL; INTRACAUDAL PRN
Status: DISCONTINUED | OUTPATIENT
Start: 2019-03-29 | End: 2019-03-29

## 2019-03-29 RX ORDER — ONDANSETRON 2 MG/ML
INJECTION INTRAMUSCULAR; INTRAVENOUS PRN
Status: DISCONTINUED | OUTPATIENT
Start: 2019-03-29 | End: 2019-03-29

## 2019-03-29 RX ORDER — ONDANSETRON 4 MG/1
4 TABLET, ORALLY DISINTEGRATING ORAL
Status: DISCONTINUED | OUTPATIENT
Start: 2019-03-29 | End: 2019-03-30 | Stop reason: HOSPADM

## 2019-03-29 RX ORDER — PROPOFOL 10 MG/ML
INJECTION, EMULSION INTRAVENOUS CONTINUOUS PRN
Status: DISCONTINUED | OUTPATIENT
Start: 2019-03-29 | End: 2019-03-29

## 2019-03-29 RX ORDER — CEFAZOLIN SODIUM 2 G/50ML
2 SOLUTION INTRAVENOUS
Status: COMPLETED | OUTPATIENT
Start: 2019-03-29 | End: 2019-03-29

## 2019-03-29 RX ORDER — KETOROLAC TROMETHAMINE 10 MG/1
10 TABLET, FILM COATED ORAL EVERY 8 HOURS
Qty: 9 TABLET | Refills: 0 | Status: SHIPPED | OUTPATIENT
Start: 2019-03-29 | End: 2019-04-01

## 2019-03-29 RX ORDER — LIDOCAINE HYDROCHLORIDE 20 MG/ML
INJECTION, SOLUTION INFILTRATION; PERINEURAL PRN
Status: DISCONTINUED | OUTPATIENT
Start: 2019-03-29 | End: 2019-03-29

## 2019-03-29 RX ORDER — OXYCODONE HYDROCHLORIDE 5 MG/1
5 TABLET ORAL EVERY 4 HOURS PRN
Status: DISCONTINUED | OUTPATIENT
Start: 2019-03-29 | End: 2019-03-30 | Stop reason: HOSPADM

## 2019-03-29 RX ORDER — ACETAMINOPHEN 325 MG/1
975 TABLET ORAL ONCE
Status: COMPLETED | OUTPATIENT
Start: 2019-03-29 | End: 2019-03-29

## 2019-03-29 RX ADMIN — FENTANYL CITRATE 50 MCG: 50 INJECTION, SOLUTION INTRAMUSCULAR; INTRAVENOUS at 07:03

## 2019-03-29 RX ADMIN — PROPOFOL 200 MG: 10 INJECTION, EMULSION INTRAVENOUS at 07:21

## 2019-03-29 RX ADMIN — CEFAZOLIN SODIUM 2 G: 2 SOLUTION INTRAVENOUS at 07:40

## 2019-03-29 RX ADMIN — KETOROLAC TROMETHAMINE 30 MG: 30 INJECTION, SOLUTION INTRAMUSCULAR; INTRAVENOUS at 08:58

## 2019-03-29 RX ADMIN — SODIUM CHLORIDE, SODIUM LACTATE, POTASSIUM CHLORIDE, CALCIUM CHLORIDE: 600; 310; 30; 20 INJECTION, SOLUTION INTRAVENOUS at 06:40

## 2019-03-29 RX ADMIN — ACETAMINOPHEN 975 MG: 325 TABLET ORAL at 06:23

## 2019-03-29 RX ADMIN — BUPIVACAINE HYDROCHLORIDE 10 ML: 5 INJECTION, SOLUTION EPIDURAL; INTRACAUDAL at 07:07

## 2019-03-29 RX ADMIN — DEXAMETHASONE SODIUM PHOSPHATE 4 MG: 4 INJECTION, SOLUTION INTRA-ARTICULAR; INTRALESIONAL; INTRAMUSCULAR; INTRAVENOUS; SOFT TISSUE at 07:31

## 2019-03-29 RX ADMIN — PROPOFOL: 10 INJECTION, EMULSION INTRAVENOUS at 08:52

## 2019-03-29 RX ADMIN — ONDANSETRON 4 MG: 2 INJECTION INTRAMUSCULAR; INTRAVENOUS at 08:58

## 2019-03-29 RX ADMIN — GABAPENTIN 300 MG: 300 CAPSULE ORAL at 06:23

## 2019-03-29 RX ADMIN — PROPOFOL 200 MCG/KG/MIN: 10 INJECTION, EMULSION INTRAVENOUS at 07:22

## 2019-03-29 RX ADMIN — LIDOCAINE HYDROCHLORIDE 80 MG: 20 INJECTION, SOLUTION INFILTRATION; PERINEURAL at 07:21

## 2019-03-29 ASSESSMENT — MIFFLIN-ST. JEOR: SCORE: 1617.29

## 2019-03-29 NOTE — ANESTHESIA CARE TRANSFER NOTE
Patient: Shiloh Cheatham    Procedure(s):  Right Shoulder Arthroscopic Labral Repair    Diagnosis: Right Labral Tear  Diagnosis Additional Information: No value filed.    Anesthesia Type:   General, LMA, Peripheral Nerve Block for post-op pain at surgeon's request     Note:  Airway :Nasal Cannula  Patient transferred to:PACU  Comments: VSS and WNL, comfortable, no PONV, report to Concha MANTILLAHandoff Report: Identifed the Patient, Identified the Reponsible Provider, Reviewed the pertinent medical history, Discussed the surgical course, Reviewed Intra-OP anesthesia mangement and issues during anesthesia, Set expectations for post-procedure period and Allowed opportunity for questions and acknowledgement of understanding      Vitals: (Last set prior to Anesthesia Care Transfer)    CRNA VITALS  3/29/2019 0845 - 3/29/2019 0922      3/29/2019             Pulse:  85    SpO2:  100 %    Resp Rate (set):  10                Electronically Signed By: NAEEM Kwok CRNA  March 29, 2019  9:22 AM

## 2019-03-29 NOTE — ANESTHESIA PROCEDURE NOTES
Peripheral Nerve Block Procedure Note    Staff:     Anesthesiologist:  Fan Solo MD  Procedure Start/Stop TImes:      3/29/2019 6:55 AM     3/29/2019 7:04 AM    patient identified, IV checked, site marked, risks and benefits discussed, informed consent, monitors and equipment checked, pre-op evaluation, at physician/surgeon's request and post-op pain management      Correct Patient: Yes      Correct Position: Yes      Correct Site: Yes      Correct Procedure: Yes      Correct Laterality:  Yes    Site Marked:  Yes  Procedure details:     Procedure:  Interscalene    Laterality:  Right    Position:  Supine and Sitting    Sterile Prep: chloraprep, mask and sterile gloves      Local skin infiltration:  None    Needle:  Short bevel    Needle gauge:  21    Needle length (inches):  4    Ultrasound: Yes      Ultrasound used to identify targeted nerve, plexus, or vascular structure and placed a needle adjacent to it      Permanent Image entered into patiient's record      Abnormal pain on injection: No      Blood Aspirated: No      Paresthesias:  No    Bleeding at site: No      Test dose negative for signs of intravascular injection: Yes      Bolus via:  Needle    Infusion Method:  Single Shot    Complications:  None  Assessment/Narrative:     Injection made incrementally with aspirations every (mL):  5

## 2019-03-29 NOTE — BRIEF OP NOTE
[facility]    Orthopaedic Surgery  Brief Operative Note    Pre-operative diagnosis: Right Shoulder Labral Tear   Post-operative diagnosis Same    Procedure: Procedure(s):  Right Shoulder Arthroscopic Labral Repair   Surgeon: Adina Cortes MD   Assistants(s): Artie Agudelo MD   Anesthesia: Other    Estimated blood loss: Minimal   Total IV fluids: (See anesthesia record)   Blood transfusion: (See anesthesia record)   Total urine output: (See anesthesia record)   Drains: None   Specimens: * No specimens in log *   Findings: Multidirectional instability (1-2+) anterior and posterior; Anterior and Posterior inferior labral tear with atrophic inferior labrum.   Complications: None   Implants: Arthrex 2.4 Suturetak x 4 (anterior and posterior-inferior), 2.9 Pushlock x 2 (posterior)

## 2019-03-29 NOTE — OR NURSING
Preop Exparel right interscalene nerve block done by anesthesia. Patient tolerated procedure well, VSS throughout. Versed 1mg and Fentanyl 50mcg given.

## 2019-03-29 NOTE — DISCHARGE INSTRUCTIONS
"[facility]  Home Care Following Anesthesia  For 24 hours after surgery:  1. Get plenty of rest.  A responsible adult must stay with you for at least 24 hours after you leave the surgery center.  2. Do not drive or use heavy equipment.  If you have weakness or tingling, don't drive or use heavy equipment until this feeling goes away.   3. Do not drink alcohol.   4. Avoid strenuous or risky activities.  Ask for help when climbing stairs.  5. You may feel lightheaded.  IF so, sit for a few minutes before standing.  Have someone help you get up.   6. If you have nausea (feel sick to your stomach): Drink only clear liquids such as apple juice, ginger ale, broth or 7-Up.  Rest may also help.  Be sure to drink enough fluids.  Move to a regular diet as you feel able.   7. You may have a slight fever.  Call the doctor if your fever is over 100 F (37.7 C) (taken under the tongue) or lasts longer than 24 hours.  8. You may have a dry mouth, a sore throat, muscle aches or trouble sleeping. These should go away after 24 hours.  9. Do not make important or legal decisions.   If you use hormonal birth control (such as the pill, patch, ring or implants):  You will need a second form of birth control for 7 days (condoms, a diaphragm or contraceptive foam).  While in the surgery center, you received a medicine called Sugammadex.  Hormonal birth control (such as the pill, patch, ring or implants) will not work as well for a week after taking this medicine.  Today you received an Exparel block to numb the nerves near your surgery site.  This is a block using local anesthetic or \"numbing\" medication injected around the nerves to anesthetize or \"numb\" the area supplied by those nerves.  This block is injected into the muscle layer near your surgical site.  This medication may numb the location where you had surgery up to 72 hours.  If your surgical site is an arm or leg you should be careful with " your affected limb, since it is possible to injure your limb without being aware of it due to the numbing.  Until full feeling returns, you should guard against bumping or hitting your limb, and avoid extreme hot or cold temperatures on the skin.  As the block wears off, the feeling will return as a tingling or prickly sensation near your surgical site.  You will experince more discomfort from your incision as the feeling returns.  You may want to take a pain pill (a narcotic or Tylenol if this was prescribed by your surgeon) when you start to experience mild pain before the pain beomes more severe.  If your pain medications do not control your pain, you should notify your surgeon.    Tips for taking pain medications  To get the best pain relief possible, remember these points:    Take pain medications as directed, before pain becomes severe.    Pain medication can upset your stomach: taking it with food may help.    Constipation is a common side effect of pain medication. Drink plenty of  fluids.    Eat foods high in fiber. Take a stool softener if recommended by your doctor or pharmacist.    Do not drink alcohol, drive or operate machinery while taking pain medications.    Ask about other ways to control pain, such as with heat, ice or relaxation.    Tylenol/Acetaminophen Consumption  To help encourage the safe use of acetaminophen, the makers of TYLENOL  have lowered the maximum daily dose for single-ingredient Extra Strength TYLENOL  (acetaminophen) products sold in the [place] from 8 pills per day (4,000 mg) to 6 pills per day (3,000 mg). The dosing interval has also changed from 2 pills every 4-6 hours to 2 pills every 6 hours.    If you feel your pain relief is insufficient, you may take Tylenol/Acetaminophen in addition to your narcotic pain medication.     Be careful not to exceed 3,000 mg of Tylenol/Acetaminophen in a 24 hour period from all sources.    If you are taking extra strength Tylenol/acetaminophen  (500 mg), the maximum dose is 6 tablets in 24 hours.    If you are taking regular strength acetaminophen (325 mg), the maximum dose is 9 tablets in 24 hours.    Call a doctor for any of the followin. Signs of infection (fever, growing tenderness at the surgery site, a large amount of drainage or bleeding, severe pain, foul-smelling drainage, redness, swelling).  2. It has been over 8 to 10 hours since surgery and you are still not able to urinate (pass water).  3. Headache for over 24 hours.  4. Numbness, tingling or weakness the day after surgery (if you had spinal anesthesia).  Your doctor is:  Dr. Adina Cortes, Orthopaedics: [phone]                    Or dial [phone] and ask for the resident on call for:  Orthopaedics  For emergency care, call the:  [facility] [place], [place] Emergency Department: [phone] (TTY for hearing impaired: [phone])            Post Operative Instructions: Regional Anesthetic for Upper Extremity    General Information:   Regional anesthesia is when local anesthetic or  numbing  medication is injected around the nerves to anesthetize or  numb  the area supplied by that set of nerves.      Types of Regional Blocks:  Interscalene: A block injected into the neck on the operative shoulder/arm of a patient having shoulder surgery  Supraclavicular: A block injected near the clavicle on the operative shoulder of a patient having elbow, forearm, or hand surgery    Procedure:  The type of anesthesia your doctor used to numb your shoulder or arm will usually not wear off for 6-18 hours, but may last as long as 24 hours. You should be careful during that period, since it is possible to injure your arm without being aware of the injury. While your arm is numb, you should:    Avoid striking or bumping your arm    Avoid extreme hot or cold    Diet:  There are no restrictions on your diet. You should drink plenty of fluids.     Discomfort:  You will have a tingling and prickly sensation in your  arm as the feeling begins to return. You can also expect some discomfort. The amount of discomfort is unpredictable, but if you have more pain than can be controlled with pain medication you should notify your physician.     Pain Medicine:   Begin taking your oral pain pills (if you have not already done so) before bedtime and during the night to avoid a sudden onset of pain as the block wears off.  Do not engage in drinking, driving, or hazardous occupations while taking pain medication.     Stitches:   You may have stitches or special skin closures. You doctor will inform you when to return to the office to have them removed.     Activity:  On the day of surgery you should try to stay in bed with your hand elevated on pillows. You may resume your normal activity after that, wearing a sling for comfort. Contact your physician if you have any of the problems:     Continued numbness or tingling in the arm or hand after 24 hours    Swelling of the fingers or fingers that are cold to the touch    Excessive bleeding or drainage    Severe pain

## 2019-03-29 NOTE — ANESTHESIA POSTPROCEDURE EVALUATION
Anesthesia POST Procedure Evaluation    Patient: Shiloh Cheatham   MRN:     [id number] Gender:   female   Age:    18 year old :      2000        Preoperative Diagnosis: Right Labral Tear   Procedure(s):  Right Shoulder Arthroscopic Labral Repair   Postop Comments: No value filed.       Anesthesia Type:  Regional, MAC    Reportable Event: NO     PAIN: Uncomplicated   Sign Out status: Comfortable, Well controlled pain     PONV: No PONV   Sign Out status:  No Nausea or Vomiting     Neuro/Psych: Uneventful perioperative course   Sign Out Status: Preoperative baseline; Age appropriate mentation     Airway/Resp.: Uneventful perioperative course   Sign Out Status: Non labored breathing, age appropriate RR; Resp. Status within EXPECTED Parameters     CV: Uneventful perioperative course   Sign Out status: Appropriate BP and perfusion indices; Appropriate HR/Rhythm     Disposition:   Sign Out in:  PACU  Disposition:  Phase II; Home  Recovery Course: Uneventful  Follow-Up: Not required           Last Anesthesia Record Vitals:  CRNA VITALS  3/29/2019 0845 - 3/29/2019 0945      3/29/2019             Pulse:  85    SpO2:  100 %    Resp Rate (set):  10          Last PACU/Preop Vitals:  Vitals:    19 0927 19 0930 19 1000   BP: 128/78 112/75 126/73   Pulse:      Resp: 11 14 14   Temp:  36.5  C (97.7  F) 36.4  C (97.6  F)   SpO2: 97%  96%         Electronically Signed By: Fan Solo MD, 2019, 6:02 PM

## 2019-03-30 NOTE — OP NOTE
DATE OF PROCEDURE: 03/29/19     PREOPERATIVE DIAGNOSES:   1. Right shoulder pain and instability.   2. Right shoulder labral tear     POSTOPERATIVE DIAGNOSES:   1. Right shoulder multi-directional instability.   2. Right shoulder labral tear (anterior-inferior and posterior-inferior)    PROCEDURES:   1. Right arthroscopic anterior-inferior and posterior-inferior labral repair.     STAFF SURGEON: Adina Cortes MD   ASSISTANT: Artie Agudelo MD  The assistance of Dr. Agudelo was necessary for assistance with instrument management and labral repair in the absence of an available orthopaedic resident to assist.     ANESTHESIA: General anesthesia with supplementary interscalene block.   ESTIMATED BLOOD LOSS: 5 mL.   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 We obtained an MRI which demonstrated a labral tear. Given symptoms of increasing pain and instability and the MRI findings, we discussed the risks and benefits of nonoperative and operative management. We discussed the risks specific to surgery including that she could be no better or even worse if she developed bleeding ,infection, or had an injury to nerves or arteries which power the arm or hand, or had a reaction to anesthesia including damage to the heart, lungs, or brain. The patient wished to proceed to surgery at this time. Informed consent was completed.     DESCRIPTION OF PROCEDURE: The patient was identified in the preoperative area and the correct right shoulder was marked for surgery. The patient was provided an interscalene block by our Anesthesia colleagues and was taken to the operating room where she was surrendered to general endotracheal anesthesia. An exam under anesthesia demonstrated on load and shift 2+ translation anteriorly and at least 1+ translation posteriorly. There  was 1+ translation anterior and posterior on the Left shoulder. The patient was positioned in the left lateral decubitus position with the right arm up. The head and neck were kept in neutral position and all bony prominences were well padded. The right upper extremity was then prepped and draped in the usual sterile fashion. A timeout was held in accordance with hospital policy, confirming correct patient, side, site, procedure and administration of IV antibiotics prior to incision. I initiated shoulder arthroscopy through a posterolateral viewing portal. I created an anterior working portal and performed a diagnostic arthroscopy. The long head of the biceps was intact and there was very mild fibrillation of the superior labrum but to tear was present. There was tearing of the anterior-inferior labrum and posterior-inferior labrum. The inferior labrum between these tears was atrophic and there was no labral bumper from 3 o'clock wrapping inferiorly to 9 o'clock posteriorly. The humeral head and glenoid chondral surfaces were well preserved. The subscapularis, supraspinatus, infraspinatus, teres minor were intact. There were no loose bodies in the axillary pouch. I used an elevator to mobilize the labrum from the glenoid neck from 3 o'clock to 9 o'clock. This was then able to float up to the level of the glenoid. Through a 7'oclock posterior lateral portal we placed a 2.4 Suturetak at 6 o'clock and passed the suture through capsule and labrum inferiorly. These sutures were tied. I passed a labral tape through labrum and capsule posteriorly and secured this to a 2.9 PushLock at the 7 and 8 o'clock positions. We then moved to the anterior repair. Through a trans-subscapularis portal (through the upper rolled border of the subscap laterally a 2.4 Suturetak was placed at the 5 o'clock position and sutures passed through capsule and labrum securing it at that position. This was repeated at the 4 o'clock position. Through  the standard anterior portal the final Suturetak was placed at the 3 o'clock position. This construct repaired the entire inferior glenoid bumper. The humeral head then sat centered over the glenoid. All instruments were then removed from the shoulder and the portals were closed with interrupted 3-0 Monocryl. Steri-Strips and soft sterile dressing were applied. The arm was placed into an abduction sling and the patient was extubated and transported to the recovery room in stable condition. There no apparent intraoperative complications.     POSTOPERATIVE PLAN:   1. The patient will be discharged to home when she meets Same Day discharge criteria.   2. The patient will be seen by me at 2 weeks for a wound check. We will start the rehab plan now with passive external rotation to neutral. She can progress passive ER at the side by 10 degrees/week. She may begin passive supine FE to 90 at 2 weeks. She may begin active assisted range of motion at 6 weeks with active motion shortly thereafter.    TRACIE HAYS MD

## 2019-04-10 ENCOUNTER — OFFICE VISIT (OUTPATIENT)
Dept: ORTHOPEDICS | Facility: CLINIC | Age: 19
End: 2019-04-10
Payer: COMMERCIAL

## 2019-04-10 DIAGNOSIS — S43.431D TEAR OF RIGHT GLENOID LABRUM, SUBSEQUENT ENCOUNTER: Primary | ICD-10-CM

## 2019-04-10 NOTE — NURSING NOTE
Reason For Visit:   Chief Complaint   Patient presents with     Surgical Followup     2 week pop DOS 3/29/19 Right shoulder arthroscopic labral repair        PCP: No primary care provider on file.  Ref: Self    ?  No  Occupation Student at the [employer].  Currently working? No.    Type of injury: Last year subluxed the shoulder.  Date of surgery: 3/29/2019  Type of surgery: Right arthroscopic anterior-inferior and posterior-inferior labral repair.  Smoker: No  Request smoking cessation information: No    Right hand dominant    SANE score  Affected shoulder: Right  Right shoulder SANE: 0  Left shoulder SANE: 100    LMP 03/28/2019       Pain Assessment  Patient Currently in Pain: Yes  0-10 Pain Scale: 2  Primary Pain Location: Shoulder(Right)  Alleviating Factors: Pain medication, Other (comment), Ice(Tylenol)  Aggravating Factors: Movement    Coco Armendariz ATC

## 2019-04-10 NOTE — PROGRESS NOTES
CHIEF CONCERN:  2 weeks s/p R labral repair on 3/29/2019    HISTORY OF PRESENT ILLNESS:  Lc is a 18F RHD  who is 2 weeks s/p above procedure. She is doing well with no complaints at this time. Has been compliant with sling use and is doing finger and wrist AROM, elbow PROM, and passive ER to 0 degrees.     PHYSICAL EXAM:    Gen: Adult female in no acute distress. Articulates and communicates with normal affect.  Pulm: Respirations even and unlabored  CV: Extr wwp  MSK: Focused right upper extremity exam demonstrates well-healing arthroscopy incision sites.  There is no erythema, drainage, fluctuance, purulence.  Fires EPL, FPL, intrinsics.  SILT in median, radial, ulnar nerve distal regions.  2+ radial pulse.    IMAGING:  None new    ASSESSMENT:  18F RHD  2 weeks s/p R labral repair     PLAN:  Continue physical therapy as outlined in op note.  Follow-up in clinic as scheduled     Srinivasan Patiño MD  Orthopaedic Surgery, PGY-1    I have personally examined this patient and have reviewed the clinical presentation and progress note with the resident.  I agree with the treatment plan as outlined.  The plan was formulated with the resident on the day of the resident's note.

## 2019-04-10 NOTE — LETTER
4/10/2019       RE: Shiloh Cheatham  [address]  [address]  [place] [address]     Dear Colleague,    Thank you for referring your patient, Shiloh Cheatham, [facility]. Please see a copy of my visit note below.    CHIEF CONCERN:  2 weeks s/p R labral repair on 3/29/2019    HISTORY OF PRESENT ILLNESS:  Lc is a 18F RHD  who is 2 weeks s/p above procedure. She is doing well with no complaints at this time. Has been compliant with sling use and is doing finger and wrist AROM, elbow PROM, and passive ER to 0 degrees.     PHYSICAL EXAM:    Gen: Adult female in no acute distress. Articulates and communicates with normal affect.  Pulm: Respirations even and unlabored  CV: Extr wwp  MSK: Focused right upper extremity exam demonstrates well-healing arthroscopy incision sites.  There is no erythema, drainage, fluctuance, purulence.  Fires EPL, FPL, intrinsics.  SILT in median, radial, ulnar nerve distal regions.  2+ radial pulse.    IMAGING:  None new    ASSESSMENT:  18F RHD  2 weeks s/p R labral repair     PLAN:  Continue physical therapy as outlined in op note.  Follow-up in clinic as scheduled     Srinivasan Patiño MD  Orthopaedic Surgery, PGY-1    I have personally examined this patient and have reviewed the clinical presentation and progress note with the resident.  I agree with the treatment plan as outlined.  The plan was formulated with the resident on the day of the resident's note.       Again, thank you for allowing me to participate in the care of your patient.      Sincerely,    Adina Cortes MD

## 2019-04-10 NOTE — LETTER
Date:April 29, 2019      Patient was self referred, no letter generated. Do not send.        [facility] Health Information

## 2019-04-25 ENCOUNTER — OFFICE VISIT (OUTPATIENT)
Dept: ORTHOPEDICS | Facility: CLINIC | Age: 19
End: 2019-04-25
Payer: COMMERCIAL

## 2019-04-25 VITALS — WEIGHT: 175 LBS | BODY MASS INDEX: 26.52 KG/M2 | HEIGHT: 68 IN | RESPIRATION RATE: 16 BRPM

## 2019-04-25 DIAGNOSIS — F41.1 GENERALIZED ANXIETY DISORDER: Primary | ICD-10-CM

## 2019-04-25 RX ORDER — GABAPENTIN 300 MG/1
600 CAPSULE ORAL AT BEDTIME
Qty: 60 CAPSULE | Refills: 1 | Status: SHIPPED | OUTPATIENT
Start: 2019-04-25 | End: 2019-05-11 | Stop reason: ALTCHOICE

## 2019-04-25 ASSESSMENT — MIFFLIN-ST. JEOR: SCORE: 1612.29
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
## 2019-04-25 NOTE — LETTER
Date:May 28, 2019      Patient was self referred, no letter generated. Do not send.        [facility] Health Information

## 2019-05-01 ENCOUNTER — OFFICE VISIT (OUTPATIENT)
Dept: ORTHOPEDICS | Facility: CLINIC | Age: 19
End: 2019-05-01
Payer: COMMERCIAL

## 2019-05-01 VITALS
HEART RATE: 98 BPM | WEIGHT: 169 LBS | HEIGHT: 68 IN | DIASTOLIC BLOOD PRESSURE: 77 MMHG | BODY MASS INDEX: 25.61 KG/M2 | SYSTOLIC BLOOD PRESSURE: 123 MMHG

## 2019-05-01 DIAGNOSIS — F41.8 DEPRESSION WITH ANXIETY: Primary | ICD-10-CM

## 2019-05-01 ASSESSMENT — ANXIETY QUESTIONNAIRES
1. FEELING NERVOUS, ANXIOUS, OR ON EDGE: NEARLY EVERY DAY
5. BEING SO RESTLESS THAT IT IS HARD TO SIT STILL: MORE THAN HALF THE DAYS
IF YOU CHECKED OFF ANY PROBLEMS ON THIS QUESTIONNAIRE, HOW DIFFICULT HAVE THESE PROBLEMS MADE IT FOR YOU TO DO YOUR WORK, TAKE CARE OF THINGS AT HOME, OR GET ALONG WITH OTHER PEOPLE: EXTREMELY DIFFICULT
3. WORRYING TOO MUCH ABOUT DIFFERENT THINGS: MORE THAN HALF THE DAYS
2. NOT BEING ABLE TO STOP OR CONTROL WORRYING: MORE THAN HALF THE DAYS
6. BECOMING EASILY ANNOYED OR IRRITABLE: NEARLY EVERY DAY
GAD7 TOTAL SCORE: 15
7. FEELING AFRAID AS IF SOMETHING AWFUL MIGHT HAPPEN: SEVERAL DAYS

## 2019-05-01 ASSESSMENT — MIFFLIN-ST. JEOR: SCORE: 1585.08

## 2019-05-01 ASSESSMENT — PATIENT HEALTH QUESTIONNAIRE - PHQ9
SUM OF ALL RESPONSES TO PHQ QUESTIONS 1-9: 22
5. POOR APPETITE OR OVEREATING: MORE THAN HALF THE DAYS
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
## 2019-05-02 ASSESSMENT — ANXIETY QUESTIONNAIRES: GAD7 TOTAL SCORE: 15

## 2019-05-06 ENCOUNTER — OFFICE VISIT (OUTPATIENT)
Dept: ORTHOPEDICS | Facility: CLINIC | Age: 19
End: 2019-05-06
Payer: COMMERCIAL

## 2019-05-06 DIAGNOSIS — M25.311 SHOULDER INSTABILITY, RIGHT: Primary | ICD-10-CM

## 2019-05-06 RX ORDER — QUETIAPINE FUMARATE 25 MG/1
100 TABLET, FILM COATED ORAL 2 TIMES DAILY
COMMUNITY
End: 2023-02-01

## 2019-05-06 NOTE — PROGRESS NOTES
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 They have continued to work on her rehab program -primarily outside the training room but states they do stretching 1-2x/day. Pain is manageable. They are dealing with other stressors outside of sport or the shoulder.     EXAM:  Pleasant young adult in NAD. Accompanied by and seen with her ATC (Stefania).  Respirations even and unlabored.  Right upper extremity: Distally neurovascularly intact without deficits. Shoulder range of motion is active assisted FE to 140 and ER at side to 55 with a soft endpoint.     ASSESSMENT:  1. Six weeks status post above procedure    PLAN:    Range of Motion: Progress to active ROM of the shoulder per operative note.     Sling: DC sling at this time.     Pain medication: Tylenol PRN    Activity: Return to activity reviewed (may jog or stationary bike)    Follow up: 6 weeks

## 2019-05-06 NOTE — LETTER
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 They have continued to work on her rehab program -primarily outside the training room but states they do stretching 1-2x/day. Pain is manageable. They are dealing with other stressors outside of sport or the shoulder.     EXAM:  Pleasant young adult in NAD. Accompanied by and seen with her ATC (Stefania).  Respirations even and unlabored.  Right upper extremity: Distally neurovascularly intact without deficits. Shoulder range of motion is active assisted FE to 140 and ER at side to 55 with a soft endpoint.     ASSESSMENT:  1. Six weeks status post above procedure    PLAN:    Range of Motion: Progress to active ROM of the shoulder per operative note.     Sling: DC sling at this time.     Pain medication: Tylenol PRN    Activity: Return to activity reviewed (may jog or stationary bike)    Follow up: 6 weeks             Adina Cortes MD

## 2019-05-06 NOTE — LETTER
Date:May 7, 2019      Patient was self referred, no letter generated. Do not send.        [facility] Health Information

## 2019-05-11 ENCOUNTER — APPOINTMENT (OUTPATIENT)
Dept: ULTRASOUND IMAGING | Facility: CLINIC | Age: 19
End: 2019-05-11
Payer: COMMERCIAL

## 2019-05-11 ENCOUNTER — APPOINTMENT (OUTPATIENT)
Dept: CT IMAGING | Facility: CLINIC | Age: 19
End: 2019-05-11
Payer: COMMERCIAL

## 2019-05-11 ENCOUNTER — HOSPITAL ENCOUNTER (EMERGENCY)
Facility: CLINIC | Age: 19
Discharge: HOME | End: 2019-05-11
Attending: EMERGENCY MEDICINE | Admitting: EMERGENCY MEDICINE
Payer: COMMERCIAL

## 2019-05-11 VITALS
RESPIRATION RATE: 16 BRPM | WEIGHT: 165 LBS | HEIGHT: 68 IN | DIASTOLIC BLOOD PRESSURE: 78 MMHG | OXYGEN SATURATION: 100 % | SYSTOLIC BLOOD PRESSURE: 118 MMHG | TEMPERATURE: 98.2 F | BODY MASS INDEX: 25.01 KG/M2

## 2019-05-11 DIAGNOSIS — N83.201 CYST OF RIGHT OVARY: ICD-10-CM

## 2019-05-11 DIAGNOSIS — R10.31 ABDOMINAL PAIN, RIGHT LOWER QUADRANT: ICD-10-CM

## 2019-05-11 LAB
ALBUMIN SERPL-MCNC: 4.1 G/DL (ref 3.4–5)
ALBUMIN UR-MCNC: 10 MG/DL
ALBUMIN UR-MCNC: NEGATIVE MG/DL
ALP SERPL-CCNC: 90 U/L (ref 40–150)
ALT SERPL W P-5'-P-CCNC: 20 U/L (ref 0–50)
ANION GAP SERPL CALCULATED.3IONS-SCNC: 5 MMOL/L (ref 3–14)
APPEARANCE UR: ABNORMAL
APPEARANCE UR: CLEAR
AST SERPL W P-5'-P-CCNC: 16 U/L (ref 0–45)
BACTERIA #/AREA URNS HPF: ABNORMAL /HPF
BACTERIA #/AREA URNS HPF: ABNORMAL /HPF
BASOPHILS # BLD AUTO: 0 10E9/L (ref 0–0.2)
BASOPHILS NFR BLD AUTO: 0.4 %
BILIRUB SERPL-MCNC: 0.8 MG/DL (ref 0.2–1.3)
BILIRUB UR QL STRIP: NEGATIVE
BILIRUB UR QL STRIP: NEGATIVE
BUN SERPL-MCNC: 9 MG/DL (ref 7–30)
CALCIUM SERPL-MCNC: 9 MG/DL (ref 8.5–10.1)
CHLORIDE SERPL-SCNC: 105 MMOL/L (ref 94–109)
CO2 SERPL-SCNC: 27 MMOL/L (ref 20–32)
COLOR UR AUTO: ABNORMAL
COLOR UR AUTO: ABNORMAL
CREAT SERPL-MCNC: 0.8 MG/DL (ref 0.52–1.04)
DIFFERENTIAL METHOD BLD: NORMAL
EOSINOPHIL # BLD AUTO: 0.1 10E9/L (ref 0–0.7)
EOSINOPHIL NFR BLD AUTO: 1.1 %
ERYTHROCYTE [DISTWIDTH] IN BLOOD BY AUTOMATED COUNT: 12.3 % (ref 10–15)
GFR SERPL CREATININE-BSD FRML MDRD: >90 ML/MIN/{1.73_M2}
GLUCOSE SERPL-MCNC: 84 MG/DL (ref 70–99)
GLUCOSE UR STRIP-MCNC: NEGATIVE MG/DL
GLUCOSE UR STRIP-MCNC: NEGATIVE MG/DL
HCG UR QL: NEGATIVE
HCG UR QL: NEGATIVE
HCT VFR BLD AUTO: 42.3 % (ref 35–47)
HGB BLD-MCNC: 13.6 G/DL (ref 11.7–15.7)
HGB UR QL STRIP: NEGATIVE
HGB UR QL STRIP: NEGATIVE
IMM GRANULOCYTES # BLD: 0 10E9/L (ref 0–0.4)
IMM GRANULOCYTES NFR BLD: 0.1 %
INTERNAL QC OK POCT: YES
KETONES UR STRIP-MCNC: 40 MG/DL
KETONES UR STRIP-MCNC: NEGATIVE MG/DL
LEUKOCYTE ESTERASE UR QL STRIP: ABNORMAL
LEUKOCYTE ESTERASE UR QL STRIP: NEGATIVE
LYMPHOCYTES # BLD AUTO: 2.3 10E9/L (ref 0.8–5.3)
LYMPHOCYTES NFR BLD AUTO: 31.5 %
MCH RBC QN AUTO: 28.9 PG (ref 26.5–33)
MCHC RBC AUTO-ENTMCNC: 32.2 G/DL (ref 31.5–36.5)
MCV RBC AUTO: 90 FL (ref 78–100)
MONOCYTES # BLD AUTO: 0.6 10E9/L (ref 0–1.3)
MONOCYTES NFR BLD AUTO: 8.6 %
MUCOUS THREADS #/AREA URNS LPF: PRESENT /LPF
NEUTROPHILS # BLD AUTO: 4.3 10E9/L (ref 1.6–8.3)
NEUTROPHILS NFR BLD AUTO: 58.3 %
NITRATE UR QL: NEGATIVE
NITRATE UR QL: NEGATIVE
NRBC # BLD AUTO: 0 10*3/UL
NRBC BLD AUTO-RTO: 0 /100
PH UR STRIP: 7 PH (ref 5–7)
PH UR STRIP: 7 PH (ref 5–7)
PLATELET # BLD AUTO: 269 10E9/L (ref 150–450)
POTASSIUM SERPL-SCNC: 3.7 MMOL/L (ref 3.4–5.3)
PROT SERPL-MCNC: 7.6 G/DL (ref 6.8–8.8)
RBC # BLD AUTO: 4.71 10E12/L (ref 3.8–5.2)
RBC #/AREA URNS AUTO: 1 /HPF (ref 0–2)
RBC #/AREA URNS AUTO: 5 /HPF (ref 0–2)
SODIUM SERPL-SCNC: 138 MMOL/L (ref 133–144)
SOURCE: ABNORMAL
SOURCE: ABNORMAL
SP GR UR STRIP: 1 (ref 1–1.03)
SP GR UR STRIP: 1 (ref 1–1.03)
SQUAMOUS #/AREA URNS AUTO: 1 /HPF (ref 0–1)
SQUAMOUS #/AREA URNS AUTO: 7 /HPF (ref 0–1)
UROBILINOGEN UR STRIP-MCNC: NORMAL MG/DL (ref 0–2)
UROBILINOGEN UR STRIP-MCNC: NORMAL MG/DL (ref 0–2)
WBC # BLD AUTO: 7.4 10E9/L (ref 4–11)
WBC #/AREA URNS AUTO: 1 /HPF (ref 0–5)
WBC #/AREA URNS AUTO: 9 /HPF (ref 0–5)

## 2019-05-11 PROCEDURE — 81001 URINALYSIS AUTO W/SCOPE: CPT | Performed by: EMERGENCY MEDICINE

## 2019-05-11 PROCEDURE — 74177 CT ABD & PELVIS W/CONTRAST: CPT

## 2019-05-11 PROCEDURE — 76830 TRANSVAGINAL US NON-OB: CPT

## 2019-05-11 PROCEDURE — 81001 URINALYSIS AUTO W/SCOPE: CPT | Performed by: STUDENT IN AN ORGANIZED HEALTH CARE EDUCATION/TRAINING PROGRAM

## 2019-05-11 PROCEDURE — 85025 COMPLETE CBC W/AUTO DIFF WBC: CPT | Performed by: STUDENT IN AN ORGANIZED HEALTH CARE EDUCATION/TRAINING PROGRAM

## 2019-05-11 PROCEDURE — 25000132 ZZH RX MED GY IP 250 OP 250 PS 637: Performed by: STUDENT IN AN ORGANIZED HEALTH CARE EDUCATION/TRAINING PROGRAM

## 2019-05-11 PROCEDURE — 80053 COMPREHEN METABOLIC PANEL: CPT | Performed by: STUDENT IN AN ORGANIZED HEALTH CARE EDUCATION/TRAINING PROGRAM

## 2019-05-11 PROCEDURE — 99284 EMERGENCY DEPT VISIT MOD MDM: CPT | Mod: GC | Performed by: EMERGENCY MEDICINE

## 2019-05-11 PROCEDURE — 99285 EMERGENCY DEPT VISIT HI MDM: CPT | Mod: 25 | Performed by: EMERGENCY MEDICINE

## 2019-05-11 PROCEDURE — 25000125 ZZHC RX 250: Performed by: STUDENT IN AN ORGANIZED HEALTH CARE EDUCATION/TRAINING PROGRAM

## 2019-05-11 PROCEDURE — 87086 URINE CULTURE/COLONY COUNT: CPT | Performed by: EMERGENCY MEDICINE

## 2019-05-11 PROCEDURE — 81025 URINE PREGNANCY TEST: CPT | Performed by: EMERGENCY MEDICINE

## 2019-05-11 PROCEDURE — 81025 URINE PREGNANCY TEST: CPT | Performed by: STUDENT IN AN ORGANIZED HEALTH CARE EDUCATION/TRAINING PROGRAM

## 2019-05-11 PROCEDURE — 76700 US EXAM ABDOM COMPLETE: CPT

## 2019-05-11 PROCEDURE — 25000128 H RX IP 250 OP 636: Performed by: STUDENT IN AN ORGANIZED HEALTH CARE EDUCATION/TRAINING PROGRAM

## 2019-05-11 RX ORDER — IBUPROFEN 600 MG/1
600 TABLET, FILM COATED ORAL ONCE
Status: COMPLETED | OUTPATIENT
Start: 2019-05-11 | End: 2019-05-11

## 2019-05-11 RX ORDER — IOPAMIDOL 755 MG/ML
101 INJECTION, SOLUTION INTRAVASCULAR ONCE
Status: COMPLETED | OUTPATIENT
Start: 2019-05-11 | End: 2019-05-11

## 2019-05-11 RX ADMIN — SODIUM CHLORIDE, PRESERVATIVE FREE 75 ML: 5 INJECTION INTRAVENOUS at 19:21

## 2019-05-11 RX ADMIN — IOPAMIDOL 101 ML: 755 INJECTION, SOLUTION INTRAVENOUS at 19:21

## 2019-05-11 RX ADMIN — IBUPROFEN 600 MG: 600 TABLET ORAL at 17:31

## 2019-05-11 ASSESSMENT — ENCOUNTER SYMPTOMS
ABDOMINAL PAIN: 1
FATIGUE: 0
BLOOD IN STOOL: 0
DIZZINESS: 0
WEAKNESS: 0
FLANK PAIN: 0
VOMITING: 0
LIGHT-HEADEDNESS: 0
SHORTNESS OF BREATH: 0
FEVER: 0
WOUND: 0
CONSTIPATION: 0
DYSURIA: 0
HEMATURIA: 0
NAUSEA: 0
DIARRHEA: 0
FREQUENCY: 0

## 2019-05-11 ASSESSMENT — MIFFLIN-ST. JEOR: SCORE: 1566.94

## 2019-05-11 NOTE — ED TRIAGE NOTES
Presents with RLQ abdominal pain starting about noon today. Patient denies N/V/D. Patient reports last bowel movement last night, denies constipation. Denies fevers, chest pain or SOB.

## 2019-05-11 NOTE — ED PROVIDER NOTES
"  History     Chief Complaint   Patient presents with     Abdominal Pain     RLQ     HPI  Shiloh \"Lc\" Chaparrita is a 19 year old female with no significant past medical history who presents for 6 hours of sudden onset right lower quadrant pain.  This began spontaneously when she was sitting at work today, onset was not related to eating.  The pain was quite severe and radiated to her right upper quadrant.  Since then the pain has been intermittent.  She has not had any nausea vomiting diarrhea or abdominal distention.  She has had no prior surgeries in her abdomen.  She has not had any dysuria, recent fever, change p.o. intake, vaginal discharge or bleeding.  Her menstrual cycles have been regular.  She is not using birth control, but is monogamous with her female partner and not concern for pregnancy today, FDP 4/22/19.  She did not take anything over-the-counter for pain coming to the ER for evaluation.  She states she had had some pain similar to this although less severe when she was about 8 years old in [place] with a negative work-up and resolution of her symptoms then.  She does not smoke or use of tobacco products, denies any alcohol use, denies any drug use.  She does not take any prescription medications.    I have reviewed the Medications, Allergies, Past Medical and Surgical History, and Social History in the Epic system.    Review of Systems   Constitutional: Negative for fatigue and fever.   HENT: Negative for congestion.    Eyes: Negative for visual disturbance.   Respiratory: Negative for shortness of breath.    Cardiovascular: Negative for chest pain.   Gastrointestinal: Positive for abdominal pain. Negative for blood in stool, constipation, diarrhea, nausea and vomiting.   Genitourinary: Negative for dysuria, flank pain, frequency, genital sores, hematuria, menstrual problem, vaginal bleeding, vaginal discharge and vaginal pain.   Musculoskeletal: Negative for back pain.   Skin: Negative for " "rash and wound.   Neurological: Negative for dizziness, weakness and light-headedness.       Physical Exam   BP: 122/81  Heart Rate: 89  Temp: 98.2  F (36.8  C)  Resp: 17  Height: 172.7 cm (5' 8\")  Weight: 74.8 kg (165 lb)  SpO2: 99 %      Physical Exam   Constitutional: She is oriented to person, place, and time. She appears well-developed and well-nourished. No distress.   HENT:   Head: Normocephalic and atraumatic.   Mouth/Throat: Oropharynx is clear and moist.   Eyes: Pupils are equal, round, and reactive to light.   Neck: Normal range of motion.   Cardiovascular: Normal rate, regular rhythm, normal heart sounds and intact distal pulses.   Pulmonary/Chest: Effort normal and breath sounds normal. No respiratory distress.   Abdominal: Soft. She exhibits no distension. There is tenderness (moderate RLQ, mild RUQ) in the right upper quadrant and right lower quadrant. There is no rigidity, no rebound, no guarding and no CVA tenderness.   Musculoskeletal: She exhibits no deformity.   Neurological: She is alert and oriented to person, place, and time.   Skin: Skin is warm and dry. Capillary refill takes less than 2 seconds.   Psychiatric: She has a normal mood and affect.       ED Course        Procedures             Critical Care time:  none           Labs Ordered and Resulted from Time of ED Arrival Up to the Time of Departure from the ED   UA MACROSCOPIC WITH REFLEX TO MICRO AND CULTURE - Abnormal; Notable for the following components:       Result Value    Leukocyte Esterase Urine Large (*)     RBC Urine 5 (*)     WBC Urine 9 (*)     Bacteria Urine Few (*)     Squamous Epithelial /HPF Urine 7 (*)     All other components within normal limits   HCG QUAL URINE POCT - Normal   CBC WITH PLATELETS DIFFERENTIAL   COMPREHENSIVE METABOLIC PANEL   HCG QUALITATIVE URINE   UA MACROSCOPIC WITH REFLEX TO MICRO AND CULTURE   URINE CULTURE AEROBIC BACTERIAL            Assessments & Plan (with Medical Decision Making)   Lc is a " 19-year-old female without significant past medical history who presents with 1 day of sudden onset, severe, waxing and waning right lower quadrant abdominal pain without any accompanying nausea, vomiting, constipation or  symptoms.  CBC and CMP were within normal limits, urine pregnancy was negative.  Was nonspecific as it was not a clean-catch.  Pelvic ultrasound was negative for ovarian torsion or other acute abnormality.  Abdominal ultrasound was negative for cholelithiasis, cholecystitis or CBD dilation, hydronephrosis.  The appendix was unable to be visualized on ultrasound and so a CT abdomen pelvis was obtained that showed normal appendix and 3 cm hemorrhagic cyst vs corpus luteum of the R ovary not previously visualized on the ultrasound. A repeat clean catch UA was collected to definitively rule in or out acute cystitis as a cause. The patient was instructed to follow up with primary care and use tylenol and ibuprofen for pain relief, and to return to the ER if she has worsening pain, nausea/vomiting/inability to tolerate PO intake, fever.    I have reviewed the nursing notes.    I have reviewed the findings, diagnosis, plan and need for follow up with the patient.       Medication List      There are no discharge medications for this visit.         Final diagnoses:   Abdominal pain, right lower quadrant   Cyst of right ovary     DO [facility] Resident PGY-1      5/11/2019   [facility], [place], EMERGENCY DEPARTMENT    This data collected with the Resident working in the Emergency Department.  Patient was seen and evaluated by myself and I repeated the history and physical exam with the patient.  The plan of care was discussed with them.  The key portions of the note including the entire assessment and plan reflect my documentation.      Ms Cheatham is a 18 yo healthy woman who presented today with RLQ pain.  DDX included appendicitis, ovarian torsion, renal colic.  Cause was not  identified after imaging however decreased suspicion for life-threatening cause of pain.  Patient was discharged with instructions for follow up and return instructions for new/worsening symptoms.  Coco Thomas MD on 5/13/2019 at 2:31 PM      Coco Thomas MD  05/13/19 [id number]

## 2019-05-11 NOTE — ED AVS SNAPSHOT
[facility], [place], Emergency Department  [address] [place] [id number]  Phone:  [phone]                                    Shiloh Cheatham   MRN: [id number]    Department:  [facility], Emergency Department   Date of Visit:  5/11/2019           After Visit Summary Signature Page    I have received my discharge instructions, and my questions have been answered. I have discussed any challenges I see with this plan with the nurse or doctor.    ..........................................................................................................................................  Patient/Patient Representative Signature      ..........................................................................................................................................  Patient Representative Print Name and Relationship to Patient    ..................................................               ................................................  Date                                   Time    ..........................................................................................................................................  Reviewed by Signature/Title    ...................................................              ..............................................  Date                                               Time          22EPIC Rev 08/18

## 2019-05-12 LAB
BACTERIA SPEC CULT: NORMAL
Lab: NORMAL
SPECIMEN SOURCE: NORMAL

## 2019-05-12 NOTE — DISCHARGE INSTRUCTIONS
You started having severe right lower quadrant abdominal pain today. We are uncertain of the cause, but it could be due to an ovarian cyst. Your appendix and gallbladder are normal. You do not have ovarian torsion. We will re-check your urine today to make sure you do not have a bladder infection.  Please make an appointment to follow up with Your Primary Care Provider in 7 days for follow up.

## 2019-05-13 ASSESSMENT — ENCOUNTER SYMPTOMS: BACK PAIN: 0
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
## 2019-06-12 ENCOUNTER — OFFICE VISIT (OUTPATIENT)
Dept: ORTHOPEDICS | Facility: CLINIC | Age: 19
End: 2019-06-12
Payer: COMMERCIAL

## 2019-06-12 VITALS — HEIGHT: 68 IN | WEIGHT: 164.9 LBS | BODY MASS INDEX: 24.99 KG/M2

## 2019-06-12 DIAGNOSIS — F31.61 BIPOLAR DISORDER, CURRENT EPISODE MIXED, MILD (H): Primary | ICD-10-CM

## 2019-06-12 ASSESSMENT — MIFFLIN-ST. JEOR: SCORE: 1566.5
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
## 2019-06-12 NOTE — LETTER
Date:July 11, 2019      Patient was self referred, no letter generated. Do not send.        [facility] Health Information

## 2019-09-16 ENCOUNTER — OFFICE VISIT (OUTPATIENT)
Dept: ORTHOPEDICS | Facility: CLINIC | Age: 19
End: 2019-09-16
Payer: COMMERCIAL

## 2019-09-16 ENCOUNTER — DOCUMENTATION ONLY (OUTPATIENT)
Dept: FAMILY MEDICINE | Facility: CLINIC | Age: 19
End: 2019-09-16

## 2019-09-16 DIAGNOSIS — G89.29 CHRONIC PAIN OF RIGHT KNEE: Primary | ICD-10-CM

## 2019-09-16 DIAGNOSIS — M25.561 CHRONIC PAIN OF RIGHT KNEE: Primary | ICD-10-CM

## 2019-09-16 DIAGNOSIS — M25.569 KNEE PAIN: Primary | ICD-10-CM

## 2019-09-16 NOTE — LETTER
9/16/2019      RE: Shiloh Cheatham  [address]  [place] [address]       Lc is a very pleasant 19-year-old who is a college  here at [employer] with right-sided knee pain.  There is a report about knee pain when in high school and was told that there may be a small or partial tear of the meniscus as well as a strain to the ACL.  No surgery was ever undertaken.  And no further diagnosis was made.  During the preparticipation physical this was discussed though no future imaging was obtained.  Comes in now  complaining of approximately 1 week history of right-sided knee pain. 2 successful seasons here at the [employer] [place] [employer] without dificulty. 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    Examination shows a pleasant individual with no acute distress.  Articulate and interactive.  Stable to varus and valgus stress testing, stable posterior drawer testing, stable anterior drawer, no pivot shift despite good relaxation Lachman 0.  Reasonable endpoint.  No increased translation of my exam as it seemed equivocal to the contralateral side.  Neurovascularly intact distally.    Clinical assessment: Right-sided knee pain in a college     Plan: I am to get an MRI of the knee.  We will plan to see to see back in the training room when this is complete.    Mayank Hills MD

## 2019-09-16 NOTE — LETTER
Date:September 17, 2019      Patient was self referred, no letter generated. Do not send.        [facility] Health Information

## 2019-09-16 NOTE — PROGRESS NOTES
Lc is a very pleasant 19-year-old who is a college  here at [employer] with right-sided knee pain.  There is a report about knee pain when in high school and was told that there may be a small or partial tear of the meniscus as well as a strain to the ACL.  No surgery was ever undertaken.  And no further diagnosis was made.  During the preparticipation physical this was discussed though no future imaging was obtained.  Comes in now  complaining of approximately 1 week history of right-sided knee pain. 2 successful seasons here at the [place] without dificulty. 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    Examination shows a pleasant individual with no acute distress.  Articulate and interactive.  Stable to varus and valgus stress testing, stable posterior drawer testing, stable anterior drawer, no pivot shift despite good relaxation Lachman 0.  Reasonable endpoint.  No increased translation of my exam as it seemed equivocal to the contralateral side.  Neurovascularly intact distally.    Clinical assessment: Right-sided knee pain in a college     Plan: I am to get an MRI of the knee.  We will plan to see to see back in the training room when this is complete.    ADDENDUM:  I reviewed the MRI.  It shows intact ACL.  Intact medial meniscus, intact lateral meniscus.  Very trace chondrosis in the patellofemoral compartment trace edema in the fat pad.    At this time we will get a maximize nonsurgical management anti-inflammatories, activity modification.  Cleared to participate as tolerated.  If fails to see lasting improvement, will let us know and we can call in an oral prescription strength anti-inflammatory.  If that failed I would consider a one-time corticosteroid injection.     Radiology Consult    Emiliana Persaud is a 60 y.o. female with a history of antiphospholipid antibody syndrome and perinephric hematoma.    Past Medical History:   Diagnosis Date    Antiphospholipid antibody syndrome     Brain aneurysm     Cancer     GERD (gastroesophageal reflux disease)     Migraine headache     Thyroid disease      Past Surgical History:   Procedure Laterality Date    CHOLECYSTECTOMY      HERNIA REPAIR      KNEE SURGERY      THYROID SURGERY         Discussed with primary team    Discussed with Radiology staff, Dr. Kowalski.     Procedure: Visceral angiogram and embolization.    Scheduled Meds:    calcium gluconate IVPB  1 g Intravenous ED 1 Time    dextrose 50%  25 g Intravenous ED 1 Time    insulin regular  10 Units Intravenous ED 1 Time    methylPREDNISolone sodium succinate  40 mg Intravenous Q4H    phytonadione ((AQUA-MEPHYTON) IVPB  10 mg Intravenous ED 1 Time     Continuous Infusions:   PRN Meds:    Allergies:   Review of patient's allergies indicates:   Allergen Reactions    Bactrim [sulfamethoxazole-trimethoprim]     Iodine and iodide containing products      According to pt's son, pt's neck swells up with iodine contrast exposure       Labs:    Recent Labs  Lab 07/13/18  1404   INR 5.0*       Recent Labs  Lab 07/13/18  1404   WBC 16.68*   HGB 11.0*   HCT 35.1*   MCV 91         Recent Labs  Lab 07/13/18  1404   *      K 5.7*   *   CO2 18*   BUN 21   CREATININE 1.2   CALCIUM 8.0*   ALT 43   AST 58*   ALBUMIN 2.9*   BILITOT 1.1*         Vitals (Most Recent):  Temp: 98.7 °F (37.1 °C) (07/13/18 1358)  Pulse: 104 (07/13/18 1356)  BP: 123/80 (07/13/18 1356)  SpO2: 97 % (07/13/18 1356)    Plan:   Recommend correcting INR and trending H&H.     Will refrain from intervention at this time.    Patients history of anaphylaxis with IV contrast is concerning for intervention. If intervention needed, would likely need to be performed with anesthesia. Consider  "beginning steroids to decrease chances of reaction if procedure is needed.    Please consult if labs do not improve.  Jesus Campuzano MD ([facility])  Radiology PGY-5  [phone]      "

## 2019-09-16 NOTE — PROGRESS NOTES
Rodolfo MEYER initial assessment note  Date of service performed: 9/13/19    Concern: Acute injury  Body part: Knee  Description: Right  Injury: pain  Type: Athletics related  Date of injury: 9/5/19    S: Lc comes into [facility] in the middle of captain's practice on 9/13/19 with right knee pain. They report that they started noticing pain after doing off ice testing (sprints) on 9/5/19. They have since noticed pain in medial and posterior knee. Has noticed infrapatellar swelling. Some swelling in posterior knee. Pain with full extension. Pain with stairs. Has some feelings of giving out, but not significantly. Denies any catching episodes. Has history of right knee injury in high school. Possible meniscus tear and johnston's cyst. Saw Dr. Agudelo as part of incoming PPE and was cleared. Has not had any significant knee pain or symptoms in past two years while competing for [employer] hockey.    O: Does have some anterior knee swelling. No bruising. Can get to full passive extension but has discomfort in doing so. Pain with full knee flexion in posterior knee. 5/5 knee flexion with pain, 5/5 knee extension with pain.     (-) Lachmans. (-) Valgus Stress, (-) Varus Stress, (+) McMurrays, (+) Appleys.     A: Right knee pain - hamstring strain.  Possible medial meniscus tear. History of baker's cyst.     P: Lc was taken out of the rest of practice. Ice and ibuprofen given. Compression sleeve given. Should work on ROM over the weekend. Will see Dr. Hills on Monday for evaluation.    Stefania Mondragon, ATC

## 2019-09-17 ENCOUNTER — ANCILLARY PROCEDURE (OUTPATIENT)
Dept: MRI IMAGING | Facility: CLINIC | Age: 19
End: 2019-09-17
Attending: ORTHOPAEDIC SURGERY
Payer: COMMERCIAL

## 2019-09-17 ENCOUNTER — OFFICE VISIT (OUTPATIENT)
Dept: FAMILY MEDICINE | Facility: CLINIC | Age: 19
End: 2019-09-17
Payer: COMMERCIAL

## 2019-09-17 VITALS
HEIGHT: 68 IN | SYSTOLIC BLOOD PRESSURE: 116 MMHG | DIASTOLIC BLOOD PRESSURE: 78 MMHG | WEIGHT: 177.8 LBS | BODY MASS INDEX: 26.95 KG/M2 | HEART RATE: 91 BPM

## 2019-09-17 DIAGNOSIS — F41.8 DEPRESSION WITH ANXIETY: Primary | ICD-10-CM

## 2019-09-17 DIAGNOSIS — M25.569 KNEE PAIN: ICD-10-CM

## 2019-09-17 ASSESSMENT — MIFFLIN-ST. JEOR: SCORE: 1625
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
## 2019-09-17 NOTE — PROGRESS NOTES
"Rodolfo [facility] follow-up note  Date of service performed:  9/16/19    Concern/injury: Right knee pain    Lc comes in today saying their knee symptoms are about the same. Completed the following rehab:  - Heel slides 2x15  - TKE's 2x15  - Step Downs 2x10 1\" box  - Straight Leg Raise 2x12  - Straight Leg Raise with foot ER 2x12  - Clamshells 2x15 each side    They completed ~30 mins of captain's practice before needing to leave for appointment with Dr. Hills.    Stefania Mondragon, [facility]      "
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
## 2019-09-17 NOTE — LETTER
Date:October 8, 2019      Patient was self referred, no letter generated. Do not send.        [facility] Health Information

## 2019-09-27 ENCOUNTER — HOSPITAL ENCOUNTER (EMERGENCY)
Facility: CLINIC | Age: 19
Discharge: HOME | End: 2019-09-28
Attending: EMERGENCY MEDICINE | Admitting: EMERGENCY MEDICINE
Payer: COMMERCIAL

## 2019-09-27 ENCOUNTER — APPOINTMENT (OUTPATIENT)
Dept: GENERAL RADIOLOGY | Facility: CLINIC | Age: 19
End: 2019-09-27
Attending: EMERGENCY MEDICINE
Payer: COMMERCIAL

## 2019-09-27 ENCOUNTER — OFFICE VISIT (OUTPATIENT)
Dept: ORTHOPEDICS | Facility: CLINIC | Age: 19
End: 2019-09-27
Payer: COMMERCIAL

## 2019-09-27 DIAGNOSIS — S19.80XA HYPEREXTENSION INJURY OF NECK, INITIAL ENCOUNTER: Primary | ICD-10-CM

## 2019-09-27 DIAGNOSIS — S16.1XXA STRAIN OF NECK MUSCLE, INITIAL ENCOUNTER: ICD-10-CM

## 2019-09-27 DIAGNOSIS — S06.0X0A CONCUSSION WITHOUT LOSS OF CONSCIOUSNESS, INITIAL ENCOUNTER: ICD-10-CM

## 2019-09-27 DIAGNOSIS — S19.9XXA NECK INJURY, INITIAL ENCOUNTER: Primary | ICD-10-CM

## 2019-09-27 PROCEDURE — 99284 EMERGENCY DEPT VISIT MOD MDM: CPT | Mod: Z6 | Performed by: EMERGENCY MEDICINE

## 2019-09-27 PROCEDURE — 72040 X-RAY EXAM NECK SPINE 2-3 VW: CPT

## 2019-09-27 PROCEDURE — 25000132 ZZH RX MED GY IP 250 OP 250 PS 637: Performed by: EMERGENCY MEDICINE

## 2019-09-27 PROCEDURE — 99283 EMERGENCY DEPT VISIT LOW MDM: CPT | Performed by: EMERGENCY MEDICINE

## 2019-09-27 RX ORDER — IBUPROFEN 600 MG/1
600 TABLET, FILM COATED ORAL ONCE
Status: COMPLETED | OUTPATIENT
Start: 2019-09-27 | End: 2019-09-27

## 2019-09-27 RX ORDER — ACETAMINOPHEN 325 MG/1
975 TABLET ORAL ONCE
Status: COMPLETED | OUTPATIENT
Start: 2019-09-27 | End: 2019-09-27

## 2019-09-27 RX ADMIN — IBUPROFEN 600 MG: 600 TABLET ORAL at 23:19

## 2019-09-27 RX ADMIN — ACETAMINOPHEN 975 MG: 325 TABLET, FILM COATED ORAL at 23:19

## 2019-09-27 ASSESSMENT — ENCOUNTER SYMPTOMS
PHOTOPHOBIA: 1
BACK PAIN: 1
NECK PAIN: 1
HEADACHES: 1

## 2019-09-27 ASSESSMENT — MIFFLIN-ST. JEOR: SCORE: 1589.61

## 2019-09-27 NOTE — LETTER
Date:September 30, 2019      Patient was self referred, no letter generated. Do not send.        [facility] Health Information

## 2019-09-27 NOTE — LETTER
9/27/2019      RE: Shiloh Cheatham  [address]  [address]  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 Lc uses pronouns they/them. They sustained a hit to the inferior helmet causing hyperextension to the neck followed by 2-3 players landing on top of them causing an axial load to the spine. They felt and heard cracking noises. Felt stunned, but ok for a few minutes. Then neck pain started to set in. Lc stayed in the game to complete the period. Now, c/o subtle numbness into both arms down to hands. Feels foggy. No headache, but says it feels like it will develop soon. No light or noise sensitivity, nausea, or weakness in arms.    Past pertinent history:      Depression: Yes:      Anxiety: Yes:      Learning disability: no     ADHD: no     Past History of concussions: No    Patient's past medical, surgical, social and family histories reviewed:  reviewed on the up to date problem list in the chart    REVIEW OF SYSTEMS:  CONSTITUTIONAL:NEGATIVE for fever, chills, change in weight  INTEGUMENTARY/SKIN: NEGATIVE for worrisome rashes, moles or lesions  MUSCULOSKELETAL:See HPI above  NEURO: no other neurologic symptoms         Medications:     Current Outpatient Medications   Medication Sig     acetaminophen (TYLENOL) 325 MG tablet Take 2 tablets (650 mg) by mouth every 4 hours as needed for mild pain (Patient not taking: Reported on 5/6/2019)     QUEtiapine (SEROQUEL) 25 MG tablet Take 100 mg by mouth 2 times daily      No current facility-administered medications for this visit.              Allergies:    No Known  Allergies         Review of Systems:   A comprehensive 10 point review of systems (constitutional, ENT, cardiac, peripheral vascular, respiratory, GI, , Musculoskeletal, skin, Neurological) was performed and found to be negative except as described in this note.           Physical Exam:   COMPLETE EXAMINATION:   VITAL SIGNS: LMP 2019 (Exact Date)   GEN: healthy, alert and no distress   HEENT:   Head: Normocephalic and atraumatic.   Eyes: PERRLA, EOMI, Nystagmus: No; Painful Eye movements: No  Mouth/Throat: MMM, No erythema or exudate.   Neck:  TTP along spinous processes C3-C5; Lc restricts ROM especially with extension and right rotation  CV: S1S2 normal, RRR, no murmur  CHEST: CTA, Easy effort, No rales or wheezes  ABD: Soft. Nontender/nondistended, no HSM/mass, no rebound/guarding.  SKIN: No rash, warmth or erythema  PSYCH:mentation appears normal and affect normal/bright  NEURO: Normal strength and tone, mentation intact, speech normal, cranial nerves 2-12 intact, Romberg abnormal - unable to balance, rapid alternating movements normal, sensory deficit: decreased sensation to light touch along bilateral UE in non dermatomal pattern, tremor likely secondary to adrenaline from game and normal strength throughout  Gait: Walk in hallway at normal speed: Able   Coordination:  Finger to Nose: normal    Rapid Alternating Movements:normal  Balance Testing: Romberg:abnormal        Double leg stance eyes closed: abnormal - unable to balance         Convergence Testing: Abnormal-causes dizziness - unable to assess    VOMS: Smooth Pursuit: abnormal    Saccades horizontal: abnormal    Saccades vertical: abnormal    VORx1: abnormal           ImaginV cervical spine with flexion and extension views pending        Assessment:     1. Cervical spine injury  2. Concussion        Plan:     1. Discussed diagnoses and treatment strategy. Cervical collar placed. Athlete transported by car to ER for cervical spine  xrays 4V with flexion and extension.   2. Concussion protocol will be followed. Recommended rest from physical and cognitive activities. Once symptoms have cleared, she may begin a return to play protocol.  3. School accommodations will be provided.   4. Follow up in training room tomorrow.    BETSY Mondragon was present for the entire visit.        Negin Real MD

## 2019-09-27 NOTE — ED AVS SNAPSHOT
[facility], [place], Emergency Department  [address] [place] [id number]  Phone:  [phone]                                    Shiloh Cheatham   MRN: [id number]    Department:  [facility], Emergency Department   Date of Visit:  9/27/2019           After Visit Summary Signature Page    I have received my discharge instructions, and my questions have been answered. I have discussed any challenges I see with this plan with the nurse or doctor.    ..........................................................................................................................................  Patient/Patient Representative Signature      ..........................................................................................................................................  Patient Representative Print Name and Relationship to Patient    ..................................................               ................................................  Date                                   Time    ..........................................................................................................................................  Reviewed by Signature/Title    ...................................................              ..............................................  Date                                               Time          22EPIC Rev 08/18

## 2019-09-28 ENCOUNTER — DOCUMENTATION ONLY (OUTPATIENT)
Dept: FAMILY MEDICINE | Facility: CLINIC | Age: 19
End: 2019-09-28

## 2019-09-28 VITALS
WEIGHT: 170 LBS | TEMPERATURE: 98.3 F | OXYGEN SATURATION: 100 % | RESPIRATION RATE: 16 BRPM | HEIGHT: 68 IN | DIASTOLIC BLOOD PRESSURE: 76 MMHG | SYSTOLIC BLOOD PRESSURE: 110 MMHG | HEART RATE: 80 BPM | BODY MASS INDEX: 25.76 KG/M2

## 2019-09-28 NOTE — PROGRESS NOTES
"[facility] ATC initial assessment note  Date of service performed: 9/28/19    Concern: Acute Injury  Body part: Head/Neck  Description: Concussion; Cervical  Injury: Concussion  Type: Athletics related  Date of injury: 9/27/19    S: Lc was injured during first period of game last night (9/27/19). They was evaluated by Dr. Real in athletic training room during first intermission. Due to neurological symptoms, they was sent to the ER for x-rays.     Lc texted me at 12:20 am stating they had just been released from the ER and was told the x-rays were normal. Stated their neck feels \"wicked unstable and the concussion symptoms have gotten worse\".     Lc came in to see me around 11 am, wearing sunglasses. Exam was done in office with lights turned down due to sensitivity to light.     O: Continues to have tenderness over spinous processes C3-C7. ROM has improved since yesterday but still has decreased extension and right rotation.     SCAT5 performed. Reported 22 symptoms, severity score of 89.They rated the following a 6/6: sensitivity to light, feeling slowed down, feeling \"in a fog\", fatigue or low energy.   Orientation: 5/5  Immediate memory: 15/15  Digits backwards: 2/5, then headache increased so discontinued.  Months in reverse order: normal.     Did not perform balance due to symptoms.     Coordination: finger to nose normal.    VOMS: smooth pursuit: abnormal  Saccades horizontal: abnormal  Saccades vertical: abnormal  VORx1: abnormal    A: Concussion    P: Lc will see Dr. Real again prior to game this afternoon. They will not stay for the game but instead go home to rest, as they have sensitivity to light and noise. [facility] concussion management plan is being followed.    Stefania Mondragon, ATC          "

## 2019-09-28 NOTE — ED PROVIDER NOTES
[facility] EMERGENCY DEPARTMENT ([facility])  9/27/19 [facility] F 11:08 PM   History     Chief Complaint   Patient presents with     Neck Pain     Headache     The history is provided by the patient and medical records.     Shiloh Cheatham is a 19 year old female who presents with headache, neck pain and upper back pain after a sports-related injury tonight. Patient accompanied by partner Brandon who provides history. Partner states that patient was playing hockey, went for a save and got in the midst of a big pileup. She got cross-checked and her head tipped up and also got sat on. Sports medicine doctor sent her here for evaluation.  This injury took place at 7:30 PM. She has neck pain, upper back pain, headache, nausea, some paresthesias in her upper extremities as well as photo/phonophobia. She describes the sensation in her hands as somewhat dulled. Partner notes patient has had surgery for tendon tears in March.     I have reviewed the Medications, Allergies, Past Medical and Surgical History, and Social History in the [employer] system.    PAST MEDICAL HISTORY: History reviewed. No pertinent past medical history.    PAST SURGICAL HISTORY:   Past Surgical History:   Procedure Laterality Date     ARTHROSCOPY SHOULDER SUPERIOR LABRUM ANTERIOR TO POSTERIOR REPAIR Right 3/29/2019    Procedure: Right Shoulder Arthroscopic Labral Repair;  Surgeon: Adina Cortes MD;  Location:  OR       FAMILY HISTORY: No family history on file.    SOCIAL HISTORY:   Social History     Tobacco Use     Smoking status: Never Smoker     Smokeless tobacco: Never Used   Substance Use Topics     Alcohol use: No     Comment: occ       Discharge Medication List as of 9/28/2019 12:09 AM      CONTINUE these medications which have NOT CHANGED    Details   acetaminophen (TYLENOL) 325 MG tablet Take 2 tablets (650 mg) by mouth every 4 hours as needed for mild pain, Disp-50 tablet, R-0, E-Prescribe      QUEtiapine (SEROQUEL) 25 MG  "tablet Take 100 mg by mouth 2 times daily , Historical         STOP taking these medications       ketorolac (TORADOL) 10 MG tablet Comments:   Reason for Stopping:                No Known Allergies   Review of Systems   Eyes: Positive for photophobia.   Musculoskeletal: Positive for back pain and neck pain.   Neurological: Positive for headaches.   All other systems reviewed and are negative.      Physical Exam   BP: 119/74  Pulse: 80  Heart Rate: 69  Temp: 98.3  F (36.8  C)  Resp: 16  Height: 172.7 cm (5' 8\")  Weight: 77.1 kg (170 lb)  SpO2: 100 %      Physical Exam  Vitals signs and nursing note reviewed.   Constitutional:       General: She is not in acute distress.     Appearance: Normal appearance.   HENT:      Head: Normocephalic and atraumatic.   Eyes:      Extraocular Movements: Extraocular movements intact.      Pupils: Pupils are equal, round, and reactive to light.   Neck:      Musculoskeletal: Decreased range of motion.     Cardiovascular:      Rate and Rhythm: Normal rate and regular rhythm.   Pulmonary:      Effort: Pulmonary effort is normal.      Breath sounds: Normal breath sounds.   Skin:     General: Skin is warm.   Neurological:      General: No focal deficit present.      Mental Status: She is oriented to person, place, and time.   Psychiatric:         Mood and Affect: Mood normal.         Behavior: Behavior normal.         ED Course        Procedures          Results for orders placed or performed during the hospital encounter of 09/27/19 (from the past 24 hour(s))   XR Cervical Spine Flex/Ext 2/3 Views    Narrative    No acute fracture or traumatic subluxation.     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 " She is referred here by the team physician.  Team physician wanted flexion extension films which were done. They are normal, there is no evidence for subluxation or fracture. Her cervical collar was removed. She has no signs or symptoms concerning for spinal cord injury. She can use Tylenol and ibuprofen for pain and be cleared for return to participation by the team physician.    This part of the medical record was transcribed by Kevin Boogie Medical Scribe, from a dictation done by Joel Aruajo MD.     I have reviewed the nursing notes.    I have reviewed the findings, diagnosis, plan and need for follow up with the patient.    Discharge Medication List as of 9/28/2019 12:09 AM          Final diagnoses:   Strain of neck muscle, initial encounter     I, Angie Sanchez, am serving as a trained medical scribe to document services personally performed by Joel Araujo MD based on the provider's statements to me on September 27, 2019.  This document has been checked and approved by the attending provider.    I, Joel Araujo MD, was physically present and have reviewed and verified the accuracy of this note documented by Angie Sanchez medical scribe.     9/27/2019   [facility], [place], EMERGENCY DEPARTMENT     Joel Araujo MD  09/28/19 0036

## 2019-09-28 NOTE — PROGRESS NOTES
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 Lc uses pronouns they/them. They sustained a hit to the inferior helmet causing hyperextension to the neck followed by 2-3 players landing on top of them causing an axial load to the spine. They felt and heard cracking noises. Felt stunned, but ok for a few minutes. Then neck pain started to set in. Lc stayed in the game to complete the period. Now, c/o subtle numbness into both arms down to hands. Feels foggy. No headache, but says it feels like it will develop soon. No light or noise sensitivity, nausea, or weakness in arms.    Past pertinent history:      Depression: Yes:      Anxiety: Yes:      Learning disability: no     ADHD: no     Past History of concussions: No    Patient's past medical, surgical, social and family histories reviewed:  reviewed on the up to date problem list in the chart    REVIEW OF SYSTEMS:  CONSTITUTIONAL:NEGATIVE for fever, chills, change in weight  INTEGUMENTARY/SKIN: NEGATIVE for worrisome rashes, moles or lesions  MUSCULOSKELETAL:See HPI above  NEURO: no other neurologic symptoms         Medications:     Current Outpatient Medications   Medication Sig     acetaminophen (TYLENOL) 325 MG tablet Take 2 tablets (650 mg) by mouth every 4 hours as needed for mild pain (Patient not taking: Reported on 5/6/2019)     QUEtiapine (SEROQUEL) 25 MG tablet Take 100 mg by mouth 2 times daily      No current facility-administered medications for this visit.              Allergies:    No Known Allergies         Review of Systems:   A comprehensive 10 point review of systems (constitutional,  ENT, cardiac, peripheral vascular, respiratory, GI, , Musculoskeletal, skin, Neurological) was performed and found to be negative except as described in this note.           Physical Exam:   COMPLETE EXAMINATION:   VITAL SIGNS: LMP 2019 (Exact Date)   GEN: healthy, alert and no distress   HEENT:   Head: Normocephalic and atraumatic.   Eyes: PERRLA, EOMI, Nystagmus: No; Painful Eye movements: No  Mouth/Throat: MMM, No erythema or exudate.   Neck:  TTP along spinous processes C3-C5; Lc restricts ROM especially with extension and right rotation  CV: S1S2 normal, RRR, no murmur  CHEST: CTA, Easy effort, No rales or wheezes  ABD: Soft. Nontender/nondistended, no HSM/mass, no rebound/guarding.  SKIN: No rash, warmth or erythema  PSYCH:mentation appears normal and affect normal/bright  NEURO: Normal strength and tone, mentation intact, speech normal, cranial nerves 2-12 intact, Romberg abnormal - unable to balance, rapid alternating movements normal, sensory deficit: decreased sensation to light touch along bilateral UE in non dermatomal pattern, tremor likely secondary to adrenaline from game and normal strength throughout  Gait: Walk in hallway at normal speed: Able   Coordination:  Finger to Nose: normal    Rapid Alternating Movements:normal  Balance Testing: Romberg:abnormal        Double leg stance eyes closed: abnormal - unable to balance         Convergence Testing: Abnormal-causes dizziness - unable to assess    VOMS: Smooth Pursuit: abnormal    Saccades horizontal: abnormal    Saccades vertical: abnormal    VORx1: abnormal           ImaginV cervical spine with flexion and extension views pending        Assessment:     1. Cervical spine injury  2. Concussion        Plan:     1. Discussed diagnoses and treatment strategy. Cervical collar placed. Athlete transported by car to ER for cervical spine xrays 4V with flexion and extension.   2. Concussion protocol will be followed. Recommended rest from  physical and cognitive activities. Once symptoms have cleared, she may begin a return to play protocol.  3. School accommodations will be provided.   4. Follow up in training room tomorrow.    BETSY Mondragon was present for the entire visit.

## 2019-09-28 NOTE — DISCHARGE INSTRUCTIONS
The x-rays of your cervical spine are normal  Use Tylenol and ibuprofen for pain  Your training will need to clear you for return to participation

## 2019-09-28 NOTE — ED TRIAGE NOTES
Arrived via triage, sent from hockey game. Neck injury during game and was sent to ED for XR's. Has a headache, no nausea.

## 2019-09-30 NOTE — PROGRESS NOTES
"[facility] follow-up note  Date of service performed: 9/30/19    Concern/injury: Concussion    Subjective: Lc comes in today for check in. They said yesterday symptoms were about the same. Reports memory is worse than normal. Cannot recall what they did yesterday easily. Says they feel their long term memory is \"okay\".    Objective: Today, symptoms have improved. 21 symptoms, severity score of 77 (down from 89 on Saturday). No scores of 6. Rated headache, sensitivity to light, and difficulty remembering a 5. Says slept more than normal ~16 hours last night. Went for a walk yesterday and walked around [employer]. No other physical activity.     Decreased pain in neck, but  over C3-C7 as well as paraspinals. Improved ROM with extension and rotation. Now has normal sensation in hands.    Continues to have blurry vision and trouble with eye tracking. Says reading is very difficult. Says screens can be problematic. Says tv screens are better than phone screens are better than computer screens.     Double leg stance eyes closed is still abnormal - difficulty balancing.     Assessment: Concussion    Plan: Remain at RTL level 1. They plan to connect with  after leaving here. Not cleared for any physical activity practice or weight room. [facility] concussion management plan is being followed.     Lc will continue to think about MRI. At this time, does not want one.     Stefania Mondragon, [facility]      "

## 2019-09-30 NOTE — PROGRESS NOTES
[facility] follow-up note  Date of service performed: 9/28/19    Concern/injury: Concussion    Assessment/plan: Lc was seen by Dr. Real prior to game. Discussed getting MRI if neck symptoms do not improve. May also try muscle relaxants for neck tightness and stiffness. Lc will think about this and let us know if they would like to try.    RTL level 1. Email sent to , Madai Velez.     Will check in via phone tomorrow and in person on Monday.     Stefania Mondragon, ATC

## 2019-10-04 NOTE — PROGRESS NOTES
"[facility] ATC follow-up note  Date of service performed: 10/2/19    Concern/injury: Concussion    Subjective: Lc comes in today reporting symptoms are \"about the same\" as two days ago. Neck pain is decreasing and does have more cervical ROM. Continues to have difficulty balancing. Blurred vision and eye tracking are still difficult. Remains at RTL level 1, not attending class. Is not doing any physical activity except for short walks outside.     Objective: No SCAT5 performed today.     Assessment: Concussion    Plan: Plans to attend both counseling and psychiatry appointments tomorrow for . Will remain at RTL level 1. Will check in this weekend to determine class status for Monday. Would like to try to attend 55 min class on Monday morning if possible. Encouraged to try to normalize sleep schedule as much as possible. Continue taking Seroquel as prescribed. Will follow up with Dr. Abreu on Monday.    Stefania Mondragon, ATC      "

## 2019-10-07 ENCOUNTER — OFFICE VISIT (OUTPATIENT)
Dept: FAMILY MEDICINE | Facility: CLINIC | Age: 19
End: 2019-10-07
Payer: COMMERCIAL

## 2019-10-07 VITALS
SYSTOLIC BLOOD PRESSURE: 128 MMHG | HEIGHT: 68 IN | WEIGHT: 174 LBS | BODY MASS INDEX: 26.37 KG/M2 | DIASTOLIC BLOOD PRESSURE: 85 MMHG | HEART RATE: 105 BPM

## 2019-10-07 DIAGNOSIS — M54.2 NECK PAIN: Primary | ICD-10-CM

## 2019-10-07 ASSESSMENT — PATIENT HEALTH QUESTIONNAIRE - PHQ9: SUM OF ALL RESPONSES TO PHQ QUESTIONS 1-9: 18

## 2019-10-07 ASSESSMENT — MIFFLIN-ST. JEOR: SCORE: 1607.76

## 2019-10-07 NOTE — LETTER
Date:October 22, 2019      Patient was self referred, no letter generated. Do not send.        [facility] Information

## 2019-10-07 NOTE — PROGRESS NOTES
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     -Symptoms dysasthetic B UE R>L, dull and squishy, lasting 3-4 days  -Neck pain:  improving  -Searing SHEPHERD: Were getting better until Friday and then worse this weekend. R frontal and L posterior.  Sometimes pain all over head.    -h/o of migraines but this doesn't feel like that. Last migraine HA; 2 weeks ago before concussion  -Went to class this morning for the first time.  Hard to pay attention to. HA worse 8/10 from 6/10.  -Missed all classes last week  -Balance:  Improving but still present.  Less dizzy and nauseated.  Activity like walking up stairs makes her feel seasick. Not typically a motion sick person.  Dizzy is a feeling of wobbliness.    -Phonophobia  -Warm lights are ok but neon or fluroscent lights are bad.  Sunlight bad.   -Eating less than normal.  No emesis.    -Uses Excedrin or caffeine for migraines  -Sleeping:  A lot.  16 hours one day.  No problems with falling asleep or staying asleep.     O:  NAD  /85   Pulse 105   Ht 1.727 m (5' 8\")   Wt 78.9 kg (174 lb)   LMP 09/30/2019 (Approximate)   BMI 26.46 kg/m    HEENT: neg but pain with eye movements  No nystagmus  Neck;  FROM but slow to obtain.  Moderate ttp over the traps, LS, scalenes B  Mild ttp over the midline at approx C4-6    Neuro:  CN 2-12 intact  FTN and KAREN: neg  Rhomberg:  Neg  Strength and sensation intact  Reflexes UE 2+=  B  Neg Taylor's    A:  Concussion  Cervical strain with midline tenderness and now resolved  Sensory disturbance bilateral hands with neg xrays but limited motion on flex and extension cervical films.   Complex psychiatric issues including trauma and possible bipolar.      P:  Cervical spine MRI  Neck flexion and extension views now that she has FROM  Tylenol  Ice, heat, manual therapy with ATC for neck pain if MRI negative and xrays negative.   Will review imaging once available.     Stefania Mondragon, BETSY, was present for the " entire appt.     > 25 min of total time spent in one-on-one evalution and discussion with patient regarding nature of problem, course, prior treatments, and therapeutic options,> 50% of which was spent in counseling and coordination of care:    Maria Fernanda Abreu MD, CAQ, FACSM, CCD  [facility]  Sports Medicine and Bone Health  Team Physician;  Athletics

## 2019-10-07 NOTE — LETTER
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     -Symptoms dysasthetic B UE R>L, dull and squishy, lasting 3-4 days  -Neck pain:  improving  -Searing SHEPHERD: Were getting better until Friday and then worse this weekend. R frontal and L posterior.  Sometimes pain all over head.    -h/o of migraines but this doesn't feel like that. Last migraine HA; 2 weeks ago before concussion  -Went to class this morning for the first time.  Hard to pay attention to. HA worse 8/10 from 6/10.  -Missed all classes last week  -Balance:  Improving but still present.  Less dizzy and nauseated.  Activity like walking up stairs makes her feel seasick. Not typically a motion sick person.  Dizzy is a feeling of wobbliness.    -Phonophobia  -Warm lights are ok but neon or fluroscent lights are bad.  Sunlight bad.   -Eating less than normal.  No emesis.    -Uses Excedrin or caffeine for migraines  -Sleeping:  A lot.  16 hours one day.  No problems with falling asleep or staying asleep.     O:  NAD  /85   Pulse 105   Ht 1.727 m (5' 8\")   Wt 78.9 kg (174 lb)   LMP 09/30/2019 (Approximate)   BMI 26.46 kg/m     HEENT: neg but pain with eye movements  No nystagmus  Neck;  FROM but slow to obtain.  Moderate ttp over the traps, LS, scalenes B  Mild ttp over the midline at approx C4-6    Neuro:  CN 2-12 intact  FTN and KAREN: neg  Rhomberg:  Neg  Strength and sensation intact  Reflexes UE 2+=  B  Neg Taylor's    A:  Concussion  Cervical strain with midline tenderness and now resolved  Sensory disturbance bilateral hands with neg xrays but limited motion on flex and extension cervical films.   Complex psychiatric issues including trauma and possible bipolar.      P:  Cervical spine MRI  Neck flexion and extension views now that she has FROM  Tylenol  Ice, heat, manual therapy with ATC for neck pain if MRI negative and xrays " negative.   Will review imaging once available.     Stefania Mondragon ATC, was present for the entire appt.     > 25 min of total time spent in one-on-one evalution and discussion with patient regarding nature of problem, course, prior treatments, and therapeutic options,> 50% of which was spent in counseling and coordination of care:    Maria Fernanda Abreu MD, CAQ, FACSM, CCD  [facility]  Sports Medicine and Bone Health  Team Physician;  Athletics        Maria Fernanda Abreu MD

## 2019-10-07 NOTE — PROGRESS NOTES
"[facility] ATC follow-up note  Date of service performed: 10/6/19    Concern/injury: Concussion    Assessment/plan: Spoke with Lc via text. They report they are feeling \"pretty okay, my headache has gotten significantly worse (from a 4 out of 10 a 6 to 8 out of 10) but the other symptoms have lessened a little (from a 4 to a 3).    Plan to have Lc go to their first class Monday morning (55 min class at 9 am). RTL level 2 letter emailed out. Will follow up with Dr. Abreu after the class.    Stefania Mondragon, ATC      "

## 2019-10-08 ENCOUNTER — ANCILLARY PROCEDURE (OUTPATIENT)
Dept: GENERAL RADIOLOGY | Facility: CLINIC | Age: 19
End: 2019-10-08
Attending: FAMILY MEDICINE
Payer: COMMERCIAL

## 2019-10-08 ENCOUNTER — ANCILLARY PROCEDURE (OUTPATIENT)
Dept: MRI IMAGING | Facility: CLINIC | Age: 19
End: 2019-10-08
Attending: FAMILY MEDICINE
Payer: COMMERCIAL

## 2019-10-08 DIAGNOSIS — M54.2 NECK PAIN: ICD-10-CM

## 2019-10-09 NOTE — PROGRESS NOTES
[facility] ME[facility]ER follow-up note  Date of service performed: 10/8/19    Concern/injury: Concussion    Lc had appointment with Dr. Abreu yesterday. They went to their first class yesterday prior to the appointment. [place] that they could not retain much of the information and felt symptoms worsened on the walk there and the walk from class to [facility] for the appointment. Dr. Abreu decided to put RTL back to level 1. Email was sent out to  Madai and coaches, etc. after the appointment.    Lc had MRI and x-rays done today. Then came to practice for about 15 mins. Lights and noise of practice did cause symptoms to worsen.    Lc will not be traveling with team to [place] this week. Will follow up with Dr. Abreu next Monday. Does have a counseling appointment with [facility] on Thursday.     Stefania Mondragon, [facility]

## 2019-10-14 ENCOUNTER — OFFICE VISIT (OUTPATIENT)
Dept: FAMILY MEDICINE | Facility: CLINIC | Age: 19
End: 2019-10-14
Payer: COMMERCIAL

## 2019-10-14 VITALS
HEIGHT: 68 IN | BODY MASS INDEX: 26.99 KG/M2 | SYSTOLIC BLOOD PRESSURE: 116 MMHG | DIASTOLIC BLOOD PRESSURE: 80 MMHG | HEART RATE: 98 BPM | WEIGHT: 178.1 LBS

## 2019-10-14 DIAGNOSIS — M54.2 NECK PAIN: Primary | ICD-10-CM

## 2019-10-14 DIAGNOSIS — S06.0X0D CONCUSSION WITHOUT LOSS OF CONSCIOUSNESS, SUBSEQUENT ENCOUNTER: ICD-10-CM

## 2019-10-14 ASSESSMENT — PATIENT HEALTH QUESTIONNAIRE - PHQ9: SUM OF ALL RESPONSES TO PHQ QUESTIONS 1-9: 18

## 2019-10-14 ASSESSMENT — MIFFLIN-ST. JEOR: SCORE: 1626.36

## 2019-10-14 NOTE — PROGRESS NOTES
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  -HA much improved.  HAs are more dull now.  Always has a HA, mental and physical exertion increase the HA.  L lateral occiput HAs.  Less posterior HAs.  -Neck feels \"wildly tight\".  Typically has a tight neck even before the concussion.    -Sleeping a lot but doesn't feel rested.   -Mostly sleeps thru the night.   -Napping a lot in the day.   -Neck pain is less.  -Had cervical MRI    Current Outpatient Medications   Medication     QUEtiapine (SEROQUEL) 25 MG tablet     acetaminophen (TYLENOL) 325 MG tablet     No current facility-administered medications for this visit.          O: NAD  /80   Pulse 98   Ht 1.727 m (5' 8\")   Wt 80.8 kg (178 lb 1.6 oz)   LMP 09/30/2019   BMI 27.08 kg/m      Neck;  FROM with improved movement quality.  R trap> L ttp.    NPC  9-10 cm  Saccades:  Horizontal:  HA, eye strain, no dizziness  Vertical: HA, eye strain, mild dizziness  VOR: not done due to symptoms  Study Result     Exam: XR CERVICAL SPINE 2/3 VWS, 10/8/2019 2:55 PM     Indication:  with neck injury.  Need lateral,flexion and  extension views only; Neck pain     Comparison: MR of the same date.     Findings:      3 views of the cervical spine obtained. This includes flexion and  extension. There is good visualization through T1.     Normal alignment in the neutral position although some straightening  of the normal lordosis. During flexion and extension there is no  abnormal subluxations. No degenerative changes are noted. Soft tissues  within normal limits.                                                                      Impression: There are degenerative changes are noted. During flexion  and extension there are no abnormal subluxations.     ENEDELIA TABOR MD     MR CERVICAL SPINE W/O CONTRAST 10/8/2019 2:40 PM     Provided History:   with concussion and neck injury 10  days ago.  B UE sensation " disturbances.  Non dermatomal .  Assess for  cervical cord contusion; Neck pain  ICD-10: Neck pain     Comparison: Cervical spine plain films 9/27/2019     Technique: Sagittal T1-weighted, sagittal T2-weighted, sagittal STIR,  sagittal diffusion weighted, axial T2-weighted, and axial T2* gradient  echo images of the cervical spine were obtained without intravenous  contrast.     Findings:  The cervical vertebrae are normally aligned.  There is no disc height  narrowing at any level.  There is normal signal within and normal  contour of the cervical spinal cord.  The findings on a level by level  basis are as follows:     C2-3:  No spinal canal or neural foraminal stenosis.     C3-4:  No spinal canal or neural foraminal stenosis     C4-5:  No spinal canal or neural foraminal stenosis.     C5-6: Tiny central protrusion and annular fissure. No spinal canal or  neural foraminal stenosis.     C6-7:  No spinal canal or neural foraminal  stenosis.     C7-T1:  No spinal canal or neural foraminal stenosis.     No abnormality of the paraspinous soft tissues. There is prominence of  the palatine and adenoid tonsils.     Nonenlarged bilateral cervical lymph nodes.                                                                      Impression:   No significant spinal canal or neural foraminal stenosis. No abnormal  cord signal.     YOUNG NICHOLSON MD    A:   Bipolar Dz vs Trauma with depression and anxiety symptoms  Gender Dysphoria  Concussion   Cervical Strain    P: -I have reviewed the xrays and MRI of her cervical spine which are essentially normal except for mild annual fissuring of C5-6  -Consider dropping hardest 3 credit class in Journalism.  Will discuss with Academic Counselor for Luma Snyder.  Try class on Wed morning (50 min) and possible Thursday night class.    -Light, easy exercise bike ride  -See Carson Sanchez at [facility]  -Refer to Dr. Tani Deluca, Behavioral Optometrist  -RTC in one week.         Stefania  BETSY Mondragon, was present for the entire appt.       Rashel Pabon MD, Sports Medicine Fellow was present and examined the patient as well.        > 25 min of total time spent in one-on-one evalution and discussion with patient regarding nature of problem, course, prior treatments, and therapeutic options,> 50% of which was spent in counseling and coordination of care:    Maria Fernanda Abreu MD, CAQ, FACSM, CCD  [facility]  Sports Medicine and Bone Health  Team Physician;  Athletics

## 2019-10-14 NOTE — LETTER
Date:November 4, 2019      Patient was self referred, no letter generated. Do not send.        [facility] Information

## 2019-10-14 NOTE — LETTER
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  -HA much improved.  HAs are more dull now.  Always has a HA, mental and physical exertion increase the HA.  L lateral occiput HAs.  Less posterior HAs.  -Neck feels \"wildly tight\".  Typically has a tight neck even before the concussion.    -Sleeping a lot but doesn't feel rested.   -Mostly sleeps thru the night.   -Napping a lot in the day.   -Neck pain is less.  -Had cervical MRI    Current Outpatient Medications   Medication     QUEtiapine (SEROQUEL) 25 MG tablet     acetaminophen (TYLENOL) 325 MG tablet     No current facility-administered medications for this visit.          O: NAD  /80   Pulse 98   Ht 1.727 m (5' 8\")   Wt 80.8 kg (178 lb 1.6 oz)   LMP 09/30/2019   BMI 27.08 kg/m       Neck;  FROM with improved movement quality.  R trap> L ttp.    NPC  9-10 cm  Saccades:  Horizontal:  HA, eye strain, no dizziness  Vertical: HA, eye strain, mild dizziness  VOR: not done due to symptoms  Study Result     Exam: XR CERVICAL SPINE 2/3 VWS, 10/8/2019 2:55 PM     Indication:  with neck injury.  Need lateral,flexion and  extension views only; Neck pain     Comparison: MR of the same date.     Findings:      3 views of the cervical spine obtained. This includes flexion and  extension. There is good visualization through T1.     Normal alignment in the neutral position although some straightening  of the normal lordosis. During flexion and extension there is no  abnormal subluxations. No degenerative changes are noted. Soft tissues  within normal limits.                                                                      Impression: There are degenerative changes are noted. During flexion  and extension there are no abnormal subluxations.     ENEDELIA TABOR MD     MR CERVICAL SPINE W/O CONTRAST 10/8/2019 2:40 " PM     Provided History: [employer]  with concussion and neck injury 10  days ago.  B UE sensation disturbances.  Non dermatomal .  Assess for  cervical cord contusion; Neck pain  ICD-10: Neck pain     Comparison: Cervical spine plain films 9/27/2019     Technique: Sagittal T1-weighted, sagittal T2-weighted, sagittal STIR,  sagittal diffusion weighted, axial T2-weighted, and axial T2* gradient  echo images of the cervical spine were obtained without intravenous  contrast.     Findings:  The cervical vertebrae are normally aligned.  There is no disc height  narrowing at any level.  There is normal signal within and normal  contour of the cervical spinal cord.  The findings on a level by level  basis are as follows:     C2-3:  No spinal canal or neural foraminal stenosis.     C3-4:  No spinal canal or neural foraminal stenosis     C4-5:  No spinal canal or neural foraminal stenosis.     C5-6: Tiny central protrusion and annular fissure. No spinal canal or  neural foraminal stenosis.     C6-7:  No spinal canal or neural foraminal  stenosis.     C7-T1:  No spinal canal or neural foraminal stenosis.     No abnormality of the paraspinous soft tissues. There is prominence of  the palatine and adenoid tonsils.     Nonenlarged bilateral cervical lymph nodes.                                                                      Impression:   No significant spinal canal or neural foraminal stenosis. No abnormal  cord signal.     YOUNG NICHOLSON MD    A:   Bipolar Dz vs Trauma with depression and anxiety symptoms  Gender Dysphoria  Concussion   Cervical Strain    P: -I have reviewed the xrays and MRI of her cervical spine which are essentially normal except for mild annual fissuring of C5-6  -Consider dropping hardest 3 credit class in Journalism.  Will discuss with Academic Counselor for Luma Snyder.  Try class on Wed morning (50 min) and possible Thursday night class.    -Light, easy exercise bike ride  -See Carson Sanchez  at [facility]  -Refer to Dr. Tani Deluca, Behavioral Optometrist  -RTC in one week.         Stefania Mondragon ATC, was present for the entire appt.       Rashel Pabon MD, Sports Medicine Fellow was present and examined the patient as well.        > 25 min of total time spent in one-on-one evalution and discussion with patient regarding nature of problem, course, prior treatments, and therapeutic options,> 50% of which was spent in counseling and coordination of care:    Maria Fernanda Abreu MD, CAQ, FACSM, CCD  [facility]  Sports Medicine and Bone Health  Team Physician;  Athletics      Maria Fernanda Abreu MD

## 2019-10-17 NOTE — PROGRESS NOTES
[facility] follow-up note  Date of service performed: 10/16/19    Concern/injury: Concussion    Lc went to 9 am class this morning (50 mins) and reports that it went better than last time. Would like to try their night class tomorrow night.     Continues to have headache at all times, though it has decreased in intensity. Has neck tightness and pain at all times, but ROM has improved since initial injury. Gets dizziness and has poor balance with increased activity or moving positions too quickly.     Lc had appointment at [facility] for ongoing neck symptoms. Was accompanied by myself. Reports immediate decrease in neck tightness and stiffness after appointment. Has follow up appointment next Wednesday. Follow up appointment with Dr. Abreu is scheduled for Monday.    Stefania Mondragon, ATC

## 2019-10-17 NOTE — PROGRESS NOTES
[facility] MEYER follow-up note  Date of service performed: 10/15/19    Concern/injury: Concussion    Lc had follow-up appointment with Dr. Abreu yesterday. Moved RTL to level 2 to allow Lc to try Wednesday morning class (50 mins) and possibly Thursday night class. Letter was emailed out to , Madai.     Lc rode bike for 10 mins light exercise per recommendation from Dr. Abreu. Reported that activity was fine until their HR was elevated, at which time they began to develop worsening headache and dizziness.     Lc had appointment with Dr. Tani Deluca at [facility]. Given new rx for glasses/contacts and will follow up after receiving them. They have an appointment with [facility] tomorrow.    Stefania Mondragon, ATC

## 2019-10-21 ENCOUNTER — OFFICE VISIT (OUTPATIENT)
Dept: FAMILY MEDICINE | Facility: CLINIC | Age: 19
End: 2019-10-21
Payer: COMMERCIAL

## 2019-10-21 VITALS
HEART RATE: 93 BPM | BODY MASS INDEX: 26.95 KG/M2 | WEIGHT: 177.8 LBS | SYSTOLIC BLOOD PRESSURE: 115 MMHG | DIASTOLIC BLOOD PRESSURE: 77 MMHG | HEIGHT: 68 IN

## 2019-10-21 DIAGNOSIS — M54.2 NECK PAIN: ICD-10-CM

## 2019-10-21 DIAGNOSIS — S06.0X0D CONCUSSION WITHOUT LOSS OF CONSCIOUSNESS, SUBSEQUENT ENCOUNTER: Primary | ICD-10-CM

## 2019-10-21 ASSESSMENT — MIFFLIN-ST. JEOR: SCORE: 1625

## 2019-10-21 NOTE — PROGRESS NOTES
"S: f/u concussion, cervical strain and mental health issues.     -No further apppts scheduled at [facility] at this [facility].   -We haven't been able to figure a good second opinion for a Psychiatrist with a strong trauma focus  -Dropping Intro to Reporting.  They haven't heard back from Luma, the hockey academic counselor.  -Will try to make other classes this week  -Needs the second half of Dr. Deluca's appt, hopefully this week.    -No prisms with glasses ffrom Dr. Tani Deluca.      O:  NAD  /77   Pulse 93   Ht 1.727 m (5' 8\")   Wt 80.6 kg (177 lb 12.8 oz)   LMP 09/30/2019   BMI 27.03 kg/m    Neck; FROM, mild tightness and ttp over the B traps, LS and scalenes.      A:  Concussion   Cervical strain  Vestibulo-ocular symptoms  Complicating mental health issues    P:  RTC in two weeks  Finish evaluation with Dr. Deluca.  Encouraged scheduling of therapy appt.   Will work on finding a Psychiatrist with special expertise in Trauma to help sort out Bipolar Disease vs Trauma causing depression and anxiety.    Agree with dropping one class at this time to help lessen her overall symptoms and stress.  Try light exercise bike later this week.     Stefania Mondragon ATC, was present for the entire appt.     > 25 min of total time spent in one-on-one evalution and discussion with patient regarding nature of problem, course, prior treatments, and therapeutic options,> 50% of which was spent in counseling and coordination of care:      Maria Fernanda Abreu MD, CAQ, FACSM, CCD  [facility]  Sports Medicine and Bone Health  Team Physician;  Athletics          -  "

## 2019-10-21 NOTE — LETTER
Date:November 4, 2019      Patient was self referred, no letter generated. Do not send.        [facility] Health Information

## 2019-10-21 NOTE — LETTER
"  10/21/2019      RE: Shiloh Cheatham  [address]  [address]  [place] [address]       S: f/u concussion, cervical strain and mental health issues.     -No further apppts scheduled at [facility] at this time.   -We haven't been able to figure a good second opinion for a Psychiatrist with a strong trauma focus  -Dropping Intro to Reporting.  They haven't heard back from Luma, the hockey academic counselor.  -Will try to make other classes this week  -Needs the second half of Dr. Deluca's appt, hopefully this week.    -No prisms with glasses ffrom Dr. Tani Deluca.      O:  NAD  /77   Pulse 93   Ht 1.727 m (5' 8\")   Wt 80.6 kg (177 lb 12.8 oz)   LMP 09/30/2019   BMI 27.03 kg/m     Neck; FROM, mild tightness and ttp over the B traps, LS and scalenes.      A:  Concussion   Cervical strain  Vestibulo-ocular symptoms  Complicating mental health issues    P:  RTC in two weeks  Finish evaluation with Dr. Deluca.  Encouraged scheduling of therapy appt.   Will work on finding a Psychiatrist with special expertise in Trauma to help sort out Bipolar Disease vs Trauma causing depression and anxiety.    Agree with dropping one class at this time to help lessen her overall symptoms and stress.  Try light exercise bike later this week.     Stefania Mondragon ATC, was present for the entire appt.     > 25 min of total time spent in one-on-one evalution and discussion with patient regarding nature of problem, course, prior treatments, and therapeutic options,> 50% of which was spent in counseling and coordination of care:      Maria Fernanda Abreu MD, CAQ, FACSM, CCD  [facility]  Sports Medicine and Bone Health  Team Physician;  Athletics          -      Maria Fernanda Abreu MD    "

## 2019-10-24 NOTE — PROGRESS NOTES
"[facility] follow-up note  Date of service performed: 10/23/19    Concern/injury: Concussion    At appointment with Dr. Abreu on Monday, Lc did symptom inventory score. Listed that they have every symptom, majority were rated between 3-5. They say that their anxiety is \"off the charts\". They have had two appointments at [facility]. Did not make one for this week, saying they didn't know if they would be traveling with team to [facility] (will not travel). Says they are supposed to have appointments every 1-2 weeks \"depending how things are going\". Is supposed to make another appointment.    I spoke with , Madai, about Lc yesterday, and Madai reports she has texted Lc several times about how classes are going and has gotten no response back. I set up a meeting with Madai, Lc, and myself for today. Lc showed up to the meeting before I arrived and spoke for several minutes one-on-one with Madai. Plans to drop one journalism class, which Madai says shouldn't be a problem, as Lc will still be above 12 credits and it's before the deadline to drop a class. Lc needs to meet with journalism advisor in order to drop the class. After Lc left, Madai said that Lc seems to have things set for classes. Has an ongoing relationship with [facility] so can go to them to get any accommodations that they may need. Madai was going to send out an email update to Lc's professors. Madai also said that when she asked Lc if she missed hockey, Lc said \"no, they have a lot of other things in their life\".    Lc opted to go to [facility] appointment on their own today rather than have me attend. They were supposed to come to practice for a little bit but did not stop by. Has only been to part of one practice and one game since injury. Coaches and team have not heard much from them.    I made follow up appointment with Dr. Tani Deluca for Lc for Friday 10/25. Lc will attend appointment on own since I " am out of town with team at [facility].    Follow up with Dr. Abreu in 2 weeks when Dr. Abreu returns from being out of town. Appointment scheduled for Nov. 5th.    Stefania Mondragon ATC

## 2019-10-24 NOTE — PROGRESS NOTES
"[facility] AT[facility] follow-up note  Date of service performed: 10/20/19    Concern/injury: Concussion    Lc came to team pre-game meal yesterday at [employer]. Reported it was \"over-stimulating\" afterwards. They came to part of the game on Sunday. Came into ATR after game was over and said that it was a lot of stimulus and that symptoms had increased some. Has not been around team practices or seen coaches since they came to practice after MRI on 10/8/19, though they have been encouraged to do so. Have not seen them in the ATR. Says they are continuing to go for long walks outside for activity.     Has follow up appointment with Dr. Abreu tomorrow.    Stefania Mondragon, ATC      "

## 2019-10-29 ENCOUNTER — MEDICAL CORRESPONDENCE (OUTPATIENT)
Dept: HEALTH INFORMATION MANAGEMENT | Facility: CLINIC | Age: 19
End: 2019-10-29

## 2019-11-01 NOTE — PROGRESS NOTES
[facility] ATHLETICS  [facility] AT[facility] follow-up note  Date of service performed: 10/30/19    Concern/injury: Concussion    Lc did not show for appointment with Dr. Real today. I texted Lc and did not get a response.     Stefania Mondragon, ATC

## 2019-11-01 NOTE — PROGRESS NOTES
"[facility] ATHLETICS  [facility] follow-up note  Date of service performed: 10/28/19    Concern/injury: Concussion    Lc came to [employer] today for team photo. They were supposed to see Dr. Deluca for follow up on Friday but rescheduled for today due to needing to attend a wake on Friday. They said the weekend was very stressful due to the wake as well as having long days with lots of people around.     Reports continued issues with classes and remembering and retaining information. Feeling very anxious about trying to catch up on school work on top of attending classes. Was supposed to meet with journalism advisor to drop their one class but has not done so yet. Reminded them it is important to do this before the deadline, which is coming up soon. They said they were planning to meet with them later this week.    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 When asked if they wanted to come back to hockey, they said they didn't know, but they \"had to for the scholarship\" unless medically told they were not able to come back to sport. I said we would speak more about this with Dr. Abreu next week at follow up appointment.     Lc plans to attend follow up appointment with Dr. Deluca today on their own.     Stefania Mondragon ATC      "

## 2019-11-02 NOTE — PROGRESS NOTES
[facility] ATHLETICS  [facility] ATC follow-up note  Date of service performed: 10/31/19    Concern/injury: Concussion    I spoke with Lc's provider at [facility], Varsha Resendiz, on the phone today, discussing Lc's mental health symptoms that have increased since concussion, as well as difficulty sleeping and new migraines. Dr. Real had asked if a dosage change in Seroquel or adding in a new medication (trazadone) would be helpful. Varsha said that trazadone could be added. Low risk of Serotonin Syndrome symptoms and should warn Lc of these prior to prescribing Symptoms could include: muscle twitching and confusion. Varsha also said that she could fit Lc in rather quickly if Lc calls in to schedule a follow up (no follow up is scheduled at this time).    After discussing with Dr. Real, plan to have Lc follow up with psychiatry at [facility] to add or change medication.     I texted Lc checking in since I did not hear from them since missing appointment with Dr. Real yesterday. Lc stated they had a migraine and could not make it to appointment. I told Lc the plan to have them follow up with psychiatry and they needed to make follow up appointment at [facility] and to let me know when appointment was made. Lc agreed to this.    Lc has follow up appointment with Dr. Abreu on Tuesday 11/5.    Stefania Mondragon, ATC

## 2019-11-04 ENCOUNTER — OFFICE VISIT (OUTPATIENT)
Dept: FAMILY MEDICINE | Facility: CLINIC | Age: 19
End: 2019-11-04
Payer: COMMERCIAL

## 2019-11-04 VITALS
SYSTOLIC BLOOD PRESSURE: 126 MMHG | HEIGHT: 68 IN | HEART RATE: 99 BPM | DIASTOLIC BLOOD PRESSURE: 85 MMHG | WEIGHT: 180.6 LBS | BODY MASS INDEX: 27.37 KG/M2

## 2019-11-04 DIAGNOSIS — M54.2 NECK PAIN: ICD-10-CM

## 2019-11-04 DIAGNOSIS — F31.9 BIPOLAR AFFECTIVE DISORDER, REMISSION STATUS UNSPECIFIED (H): ICD-10-CM

## 2019-11-04 DIAGNOSIS — S06.0X0D CONCUSSION WITHOUT LOSS OF CONSCIOUSNESS, SUBSEQUENT ENCOUNTER: Primary | ICD-10-CM

## 2019-11-04 ASSESSMENT — MIFFLIN-ST. JEOR: SCORE: 1637.7

## 2019-11-04 ASSESSMENT — PATIENT HEALTH QUESTIONNAIRE - PHQ9: SUM OF ALL RESPONSES TO PHQ QUESTIONS 1-9: 15

## 2019-11-04 NOTE — LETTER
"  11/4/2019      RE: Shiloh Cheatham  [address]  [address]  [facility] [address]       S: f/u concussion, cervical pain.    -Overall improving but slowly.   -Eyes are feeling better.  Less pain. Focus not great.  -HAs less frequent, Tylenol and Advil   -150mg Seroquel (50 mg tablets) takes at night.  Psychiatrist at [facility] [place] said that adding Trazodone would be fine with the Seroquel.   -A lot of sleeping but not restful  -HAs when she wakes up (80% of the time)  -Mood is bad.  Not enthusiatic.Apathy.  Went to hockey game to watch on Sunday with her Dad and noted that he could see the toll it was having on her.  He notes that Lc is having a hard time identifying people.   -Finished second half of appt with Dr. Deluca.   -Eye rehab exercises once per day.  -Neck:  Motion Care gives her immediate relief and then the next day or so, it is tight again similar to her baseline.  Going 1x/week.   -Going to a couple of classes (Going to about 75% of classes). Not retaining information or taking it in well either.   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 Lc did not feel that way prior to the concussion.   -Needs to schedule an appt at [facility] with Psychiatrist.  Hard to motivate herself to schedule appts.   - Denies HI or SI.   -Worried that she won't be able to complete her classes this semester.       O:  Looks tired but otherwise in NAD  Their father is here today for the appt.  He is here from [place].     /85   Pulse 99   Ht 1.727 m (5' 8\")   Wt 81.9 kg (180 lb 9.6 oz)   LMP 10/21/2019   BMI 27.46 kg/m     Neck: FROM, +ttp over the B traps and LS with palpable spasm noted.  Scalenes remain tight as well. Posutre: forward flexed head position with round shoulders.     No VOMS performed today.     A:  Concussion  Neuro-ocular dysfunction  Cervical myofascial pain  Poor posture of shoulders and head  Bipolar vs Depression with MYNOR due to h/o trauma    P: 1. I agree that she won't be " able to complete her classes this semester due to the concussion. I have asked Lc to discuss this with her Academic Counselor and the DRC to see what is the best approach to withdrawing from classes due to medical reasons vs taking incompletes in her classes.    2. I have encouraged her to avoid making big decisions about hockey at this time since she is currently suffering from a lot of apathy, depression symptoms and overall feeling terrible due to her concussion.  We discussed medical DQs.   3. I would like to consider adding amitriptyline rather than trazodone since amitriptyline can help with HAs and chronic neck pain in addition to improving her sleep. We will check with her Psychiatrist at [facility] about this option.   4. Please schedule f/u at [facility] to discuss possibly increasing Seroquel which has helped her with her sleep and mental health.   5. F/u with [facility]  6. Continue with [facility]  7. Continue eye rehab  8/ Trial of low intensity cardio again this week. Discussed the benefits of exercise for recovery.   9. RTC in one to two weeks  10. Schedule appt for Psychiatry with special expertise in trauma for a second opinion regarding Bipolar Dx vs Trauma as recommended by Dr. Stefania Ahuja, Psychologist, who did her ADHD evaluation.     Stefania Mondragon ATC, was present for the entire appt.     > 40 min of total time spent in one-on-one evalution and discussion with patient regarding nature of problem, course, prior treatments, and therapeutic options,> 50% of which was spent in counseling and coordination of care:      Maria Fernanda Abreu MD, CAQ, FACSM, CCD  [facility]  Sports Medicine and Bone Health  Team Physician;  Athletics      Maria Fernanda Abreu MD

## 2019-11-04 NOTE — PROGRESS NOTES
"S: f/u concussion, cervical pain.    -Overall improving but slowly.   -Eyes are feeling better.  Less pain. Focus not great.  -HAs less frequent, Tylenol and Advil   -150mg Seroquel (50 mg tablets) takes at night.  Psychiatrist at [place] [place] said that adding Trazodone would be fine with the Seroquel.   -A lot of sleeping but not restful  -HAs when she wakes up (80% of the time)  -Mood is bad.  Not enthusiatic.Apathy.  Went to hockey game to watch on Sunday with her Dad and noted that he could see the toll it was having on her.  He notes that Lc is having a hard time identifying people.   -Finished second half of appt with Dr. Deluca.   -Eye rehab exercises once per day.  -Neck:  Motion Care gives her immediate relief and then the next day or so, it is tight again similar to her baseline.  Going 1x/week.   -Going to a couple of classes (Going to about 75% of classes). Not retaining information or taking it in well either.   -Not that interested in returning to [employer] at this time. Lc did not feel that way prior to the concussion.   -Needs to schedule an appt at [facility] [place] with Psychiatrist.  Hard to motivate herself to schedule appts.   - Denies HI or SI.   -Worried that she won't be able to complete her classes this semester.       O:  Looks tired but otherwise in NAD  Their father is here today for the appt.  He is here from [place].     /85   Pulse 99   Ht 1.727 m (5' 8\")   Wt 81.9 kg (180 lb 9.6 oz)   LMP 10/21/2019   BMI 27.46 kg/m    Neck: FROM, +ttp over the B traps and LS with palpable spasm noted.  Scalenes remain tight as well. Posutre: forward flexed head position with round shoulders.     No VOMS performed today.     A:  Concussion  Neuro-ocular dysfunction  Cervical myofascial pain  Poor posture of shoulders and head  Bipolar vs Depression with MYNOR due to h/o trauma    P: 1. I agree that she won't be able to complete her classes this semester due to the concussion. I have asked Lc to " discuss this with her Academic Counselor and the DRC to see what is the best approach to withdrawing from classes due to medical reasons vs taking incompletes in her classes.    2. I have encouraged her to avoid making big decisions about hockey at this time since she is currently suffering from a lot of apathy, depression symptoms and overall feeling terrible due to her concussion.  We discussed medical DQs.   3. I would like to consider adding amitriptyline rather than trazodone since amitriptyline can help with HAs and chronic neck pain in addition to improving her sleep. We will check with her Psychiatrist at [facility] about this option.   4. Please schedule f/u at [facility] to discuss possibly increasing Seroquel which has helped her with her sleep and mental health.   5. F/u with [facility]  6. Continue with [facility]  7. Continue eye rehab  8/ Trial of low intensity cardio again this week. Discussed the benefits of exercise for recovery.   9. RTC in one to two weeks  10. Schedule appt for Psychiatry with special expertise in trauma for a second opinion regarding Bipolar Dx vs Trauma as recommended by Dr. Stefania Ahuja, Psychologist, who did her ADHD evaluation.     Stefania Mondragon ATC, was present for the entire appt.     > 40 min of total time spent in one-on-one evalution and discussion with patient regarding nature of problem, course, prior treatments, and therapeutic options,> 50% of which was spent in counseling and coordination of care:      Maria Fernanda Abreu MD, CAQ, FACSM, CCD  [facility]  Sports Medicine and Bone Health  Team Physician;  Athletics

## 2019-11-06 NOTE — PROGRESS NOTES
"[facility] ATHLETICS  [facility] ATC follow-up note  Date of service performed: 11/6/19    Concern/injury: Concussion    Lc met with , Madai, along with their father after their appointment with Dr. Abreu on 11/4/19. Lc texted me afterwards saying the plan was to medically withdraw them from classes for the semester. They said they also were still \"seriously considering a medical DQ if things don't really get better cause I just want my brain to be okay and I don't think hockey is gonna do that for me\". Agreed to meet with  Satish, which was this afternoon.     Lc, their father,  Satish, and myself met today. Lc expresses that they would like to do a medical disqualification, as they feel that is best for their health and that even if they recover from the concussion, they do not want to put themselves at risk of sustaining another concussion. Their father is in agreement with this. I will discuss with Dr. Abreu and Dr. Agudelo about how to move forward with this.     Lc says they want to finish their degree at the [employer]. They intend to continue treatment of the concussion with Dr. Abreu and our medical staff. However, their father did say that they planned to have Lc see a specialist in [place] on their own. I explained that they are welcome to go to outside providers but would be at their own expense, and they understand this.     Lc plans to go home/to [place] next week and return soon after. Plan to go home to [place] for [place] break and then hopefully return for classes.     Madai has emailed the documentation requirements for the medical withdraw. Our staff will work to complete this. Lc has a follow up appointment scheduled with Dr. Abreu next Tuesday.    Stefania Mondragon, ATC      "

## 2019-11-12 NOTE — PROGRESS NOTES
[facility] ATHLETICS  [facility] ATC follow-up note  Date of service performed: 11/12/19    Concern/injury: Concussion    I texted Lc on 11/11/19 to confirm they were in town and could attend doctor appointment with Dr. Abreu on 11/12 at 9:40 am. They confirmed they were in town.    They did not show for their appointment at 9:40 am on 11/12/19. I texted them after missed appointment but did not get a response.    Stefania Mondragon, ATC

## 2019-12-02 NOTE — PROGRESS NOTES
[facility] ATHLETICS  [facility] ATC follow-up note  Date of service performed: 11/25/19    Concern/injury: Concussion    Assessment/plan: Lc met with team to tell them their plan of medically disqualifying from hockey on 11/12/19. Afterwards, they left town with their father, planning to return this week. They were originally scheduled to meet with Dr. Abreu tomorrow, but Dr. Abreu cancelled her clinic. Lc's appointment is rescheduled for 12/3 at 9:20 am. Confirmed with Lc this time via text.    Documentation for tuition waiver for medically withdrawing from classes this semester was completed by Dr. Abreu. This was turned in to Madai Mcknight after signed by Lc.     Stefania Mondragon ATC

## 2019-12-03 ENCOUNTER — OFFICE VISIT (OUTPATIENT)
Dept: FAMILY MEDICINE | Facility: CLINIC | Age: 19
End: 2019-12-03
Payer: COMMERCIAL

## 2019-12-03 VITALS
BODY MASS INDEX: 25.76 KG/M2 | WEIGHT: 170 LBS | SYSTOLIC BLOOD PRESSURE: 125 MMHG | DIASTOLIC BLOOD PRESSURE: 81 MMHG | HEIGHT: 68 IN

## 2019-12-03 DIAGNOSIS — S06.0X0D CONCUSSION WITHOUT LOSS OF CONSCIOUSNESS, SUBSEQUENT ENCOUNTER: Primary | ICD-10-CM

## 2019-12-03 DIAGNOSIS — F41.8 DEPRESSION WITH ANXIETY: ICD-10-CM

## 2019-12-03 ASSESSMENT — MIFFLIN-ST. JEOR: SCORE: 1589.61

## 2019-12-03 NOTE — LETTER
Date:December 16, 2019      Patient was self referred, no letter generated. Do not send.        [facility] Health Information
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
## 2019-12-06 NOTE — PROGRESS NOTES
[facility] ATHLETICS  [facility] ATC follow-up note  Date of service performed: 12/3/19    Concern/injury: Concussion    Lc met with Dr. Abreu today, after having been out of town the past 2 weeks. Reports concussion symptoms are improving. Headaches are better - less intense and less frequent, but still do occur. Has been able to do some activity - mainly circuit workouts in the weight room with lighter weights. Says they have not had any increase in symptoms during or after this lighter activity. Has not done any hockey-related activity and does not plan to do so.    Medically withdrew from classes for the fall semester. Has registered for classes for the spring. 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     Vision and tracking has improved. Says this happened a week or so ago. Still notices some difficulty when reading for long periods of time on a screen, but overall much better.     Happy with decision to be finished with hockey.     Plan to complete medical DQ paperwork. Will follow up with Dr. Abreu for final exit evaluation after winter break. Was encouraged to continue with mental health referrals/recommendations. Says they plan to follow up with psychiatry in the next couple of days for med f/u before going home for winter break.    Stefania Mondragon, ATC
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
## 2020-02-03 ENCOUNTER — OFFICE VISIT (OUTPATIENT)
Dept: FAMILY MEDICINE | Facility: CLINIC | Age: 20
End: 2020-02-03
Payer: COMMERCIAL

## 2020-02-03 VITALS
SYSTOLIC BLOOD PRESSURE: 125 MMHG | WEIGHT: 190 LBS | HEIGHT: 68 IN | DIASTOLIC BLOOD PRESSURE: 86 MMHG | BODY MASS INDEX: 28.79 KG/M2 | HEART RATE: 87 BPM

## 2020-02-03 DIAGNOSIS — M54.2 NECK PAIN: ICD-10-CM

## 2020-02-03 DIAGNOSIS — F41.8 DEPRESSION WITH ANXIETY: ICD-10-CM

## 2020-02-03 DIAGNOSIS — S06.0X0D CONCUSSION WITHOUT LOSS OF CONSCIOUSNESS, SUBSEQUENT ENCOUNTER: Primary | ICD-10-CM

## 2020-02-03 DIAGNOSIS — F31.9 BIPOLAR AFFECTIVE DISORDER, REMISSION STATUS UNSPECIFIED (H): ICD-10-CM

## 2020-02-03 ASSESSMENT — PATIENT HEALTH QUESTIONNAIRE - PHQ9: SUM OF ALL RESPONSES TO PHQ QUESTIONS 1-9: 14

## 2020-02-03 ASSESSMENT — MIFFLIN-ST. JEOR: SCORE: 1680.2

## 2020-02-03 NOTE — LETTER
Date:February 17, 2020      Patient was self referred, no letter generated. Do not send.        [facility] Health Information
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
## 2020-03-10 ENCOUNTER — HEALTH MAINTENANCE LETTER (OUTPATIENT)
Age: 20
End: 2020-03-10

## 2020-12-27 ENCOUNTER — HEALTH MAINTENANCE LETTER (OUTPATIENT)
Age: 20
End: 2020-12-27

## 2021-04-24 ENCOUNTER — HEALTH MAINTENANCE LETTER (OUTPATIENT)
Age: 21
End: 2021-04-24

## 2021-10-09 ENCOUNTER — HEALTH MAINTENANCE LETTER (OUTPATIENT)
Age: 21
End: 2021-10-09

## 2022-05-21 ENCOUNTER — HEALTH MAINTENANCE LETTER (OUTPATIENT)
Age: 22
End: 2022-05-21

## 2022-07-14 ENCOUNTER — OFFICE VISIT (OUTPATIENT)
Dept: ORTHOPEDICS | Facility: CLINIC | Age: 22
End: 2022-07-14
Payer: COMMERCIAL

## 2022-07-14 DIAGNOSIS — M25.561 ACUTE PAIN OF RIGHT KNEE: Primary | ICD-10-CM

## 2022-07-14 PROCEDURE — 99214 OFFICE O/P EST MOD 30 MIN: CPT | Performed by: FAMILY MEDICINE

## 2022-07-14 NOTE — PROGRESS NOTES
[facility]  SPORTS & ORTHOPEDIC CLINIC VISIT     Jul 14, 2022        ASSESSMENT & PLAN    22-year-old with recurrent anterior right knee pain suspected secondary to patellofemoral maltracking and chondromalacia.    Reviewed imaging and assessment with patient in detail  I recommended rest, ice and elevation.  May use patellar cutout knee sleeve for comfort.  Referred to physical therapy  Discussed use of OTC medication for pain.  May consider oral diclofenac.  Provided letter for work for the next week.  May need restrictions or return.   will contact us if this is the case.    Mike Santiago MD  [facility] SPORTS MEDICINE CLINIC [place]    -----  Chief Complaint   Patient presents with     Right Knee - Pain       SUBJECTIVE  Shiloh Cheatham is a/an 22 year old female who is seen as a self referral for evaluation of  Right Knee apin.     The patient is seen their spouse Marie.  The patient is Right handed    Onset: 2 day(s) ago. Reports insidious onset without acute precipitating event.  Location of Pain: right knee, anterior and deep  Worsened by: Walking, Sitting for extended periods, stairs   Better with: Advil   Treatments tried: Advil, Cane, ice, elevation, compression   Associated symptoms: swelling and stiffness, instability     Orthopedic/Surgical history: tear in meniscus, treated nonoperative  Social History/Occupation: Warehouse job       REVIEW OF SYSTEMS:    Do you have fever, chills, weight loss? No    Do you have any vision problems? No    Do you have any chest pain or edema? No    Do you have any shortness of breath or wheezing?  No    Do you have stomach problems? No    Do you have any numbness or focal weakness? No    Do you have diabetes? No    Do you have problems with bleeding or clotting? No    Do you have an rashes or other skin lesions? No    OBJECTIVE:  There were no vitals taken for this visit.     Patient is alert, No acute distress, pleasant and  conversational.    Gait: Antalgic    right knee:   Skin intact. No erythema or ecchymosis.  Mild effusion.    AROM: Zero to approximately 135  with pain on terminal flexion    Palpation: TTP over the anterior knee along the joint line.  No posterior medial or posterior lateral joint line tenderness     Special Tests:  Negative bounce test, negative forced flexion and negative Jayla's.  No ligamentous laxity or pain with valgus or varus stress.  Negative Lachman's, Anterior Drawer and Posterior Drawer     Full Isometric quad strength, extensor mechanism in place     Neurovascularly intact in the lower extremity      RADIOLOGY:    Three-view x-rays of the right knee are performed and reviewed independently demonstrating no acute fracture or dislocation.  Mild lateral patellar tilt.  No significant DJD..    MRI of the right knee dated 9/17/2019 is reviewed.  Per radiology report:  Impression:  1. Constellation of findings suggestive of mild patellofemoral  maltracking in the right knee:       a) Moderate grade fissuring and delamination of the patellar  radicular cartilage centered at the median ridge and medial patellar  facet.        b) Mild soft tissue edema in the superior-lateral portion of  Hoffa's fat pad.  2. Small right knee joint effusion.  3. The cruciate ligaments, medial meniscus, lateral meniscus, medial  supporting structures, and lateral supporting structures are intact.  4. No significant hyaline cartilage disease in the medial and lateral  compartments.

## 2022-07-14 NOTE — LETTER
7/14/2022      RE: Shiloh Cheatham  [address]  [address]  [facility] [address]     Dear Colleague,    Thank you for referring your patient, Shiloh Cheatham, to the [facility] SPORTS MEDICINE [facility]. Please see a copy of my visit note below.      [facility] SUR[facility]ERY [facility]ENTER  SPORTS & ORTHOPEDIC CLINIC VISIT     Jul 14, 2022        ASSESSMENT & PLAN    22-year-old with recurrent anterior right knee pain suspected secondary to patellofemoral maltracking and chondromalacia.    Reviewed imaging and assessment with patient in detail  I recommended rest, ice and elevation.  May use patellar cutout knee sleeve for comfort.  Referred to physical therapy  Discussed use of OTC medication for pain.  May consider oral diclofenac.  Provided letter for work for the next week.  May need restrictions or return.   will contact us if this is the case.    Mike Santiago MD  [facility] SPORTS MEDICINE [facility]    -----  Chief Complaint   Patient presents with     Right Knee - Pain       SUBJECTIVE  Shiloh Cheatham is a/an 22 year old female who is seen as a self referral for evaluation of  Right Knee apin.     The patient is seen their spouse Marie.  The patient is Right handed    Onset: 2 day(s) ago. Reports insidious onset without acute precipitating event.  Location of Pain: right knee, anterior and deep  Worsened by: Walking, Sitting for extended periods, stairs   Better with: Advil   Treatments tried: Advil, Cane, ice, elevation, compression   Associated symptoms: swelling and stiffness, instability     Orthopedic/Surgical history: tear in meniscus, treated nonoperative  Social History/Occupation: Warehouse job       REVIEW OF SYSTEMS:    Do you have fever, chills, weight loss? No    Do you have any vision problems? No    Do you have any chest pain or edema? No    Do you have any shortness of breath or wheezing?  No    Do you have stomach problems? No    Do you have any numbness  or focal weakness? No    Do you have diabetes? No    Do you have problems with bleeding or clotting? No    Do you have an rashes or other skin lesions? No    OBJECTIVE:  There were no vitals taken for this visit.     Patient is alert, No acute distress, pleasant and conversational.    Gait: Antalgic    right knee:   Skin intact. No erythema or ecchymosis.  Mild effusion.    AROM: Zero to approximately 135  with pain on terminal flexion    Palpation: TTP over the anterior knee along the joint line.  No posterior medial or posterior lateral joint line tenderness     Special Tests:  Negative bounce test, negative forced flexion and negative Jayla's.  No ligamentous laxity or pain with valgus or varus stress.  Negative Lachman's, Anterior Drawer and Posterior Drawer     Full Isometric quad strength, extensor mechanism in place     Neurovascularly intact in the lower extremity      RADIOLOGY:    Three-view x-rays of the right knee are performed and reviewed independently demonstrating no acute fracture or dislocation.  Mild lateral patellar tilt.  No significant DJD..    MRI of the right knee dated 9/17/2019 is reviewed.  Per radiology report:  Impression:  1. Constellation of findings suggestive of mild patellofemoral  maltracking in the right knee:       a) Moderate grade fissuring and delamination of the patellar  radicular cartilage centered at the median ridge and medial patellar  facet.        b) Mild soft tissue edema in the superior-lateral portion of  Hoffa's fat pad.  2. Small right knee joint effusion.  3. The cruciate ligaments, medial meniscus, lateral meniscus, medial  supporting structures, and lateral supporting structures are intact.  4. No significant hyaline cartilage disease in the medial and lateral  compartments.        Again, thank you for allowing me to participate in the care of your patient.      Sincerely,    Mike Santiago MD

## 2022-07-14 NOTE — LETTER
Patient:  Shiloh Cheatham  :   2000  MRN:     [id number]      2022    To whom it may concern:    Shiloh Cheatham is under my professional care for right knee injury and should be excused from work for the next week,. She may return without restrictions on 2022 unless further notified.        Please contact our office with questions or concerns.     Sincerely,        Mike Santiago MD

## 2022-07-14 NOTE — LETTER
Date:July 21, 2022      Patient was self referred, no letter generated. Do not send.        [facility] Health Information

## 2022-09-17 ENCOUNTER — HEALTH MAINTENANCE LETTER (OUTPATIENT)
Age: 22
End: 2022-09-17

## 2023-02-01 ENCOUNTER — OFFICE VISIT (OUTPATIENT)
Dept: FAMILY MEDICINE | Facility: CLINIC | Age: 23
End: 2023-02-01
Payer: COMMERCIAL

## 2023-02-01 VITALS
TEMPERATURE: 98 F | WEIGHT: 209.8 LBS | OXYGEN SATURATION: 95 % | SYSTOLIC BLOOD PRESSURE: 123 MMHG | BODY MASS INDEX: 32.93 KG/M2 | RESPIRATION RATE: 16 BRPM | DIASTOLIC BLOOD PRESSURE: 75 MMHG | HEIGHT: 67 IN | HEART RATE: 94 BPM

## 2023-02-01 DIAGNOSIS — R06.2 WHEEZING: ICD-10-CM

## 2023-02-01 DIAGNOSIS — F31.61 BIPOLAR DISORDER, CURRENT EPISODE MIXED, MILD (H): ICD-10-CM

## 2023-02-01 DIAGNOSIS — Z78.9 FEMALE-TO-MALE TRANSGENDER PERSON: ICD-10-CM

## 2023-02-01 DIAGNOSIS — Z11.59 NEED FOR HEPATITIS C SCREENING TEST: ICD-10-CM

## 2023-02-01 DIAGNOSIS — Z12.4 CERVICAL CANCER SCREENING: ICD-10-CM

## 2023-02-01 DIAGNOSIS — Z00.00 ENCOUNTER FOR PREVENTATIVE ADULT HEALTH CARE EXAMINATION: Primary | ICD-10-CM

## 2023-02-01 DIAGNOSIS — Z11.4 SCREENING FOR HIV (HUMAN IMMUNODEFICIENCY VIRUS): ICD-10-CM

## 2023-02-01 LAB
ERYTHROCYTE [DISTWIDTH] IN BLOOD BY AUTOMATED COUNT: 13.3 % (ref 10–15)
HBA1C MFR BLD: 5 % (ref 0–5.6)
HCT VFR BLD AUTO: 43.9 % (ref 35–53)
HGB BLD-MCNC: 14.7 G/DL (ref 11.7–17.7)
MCH RBC QN AUTO: 29.1 PG (ref 26.5–33)
MCHC RBC AUTO-ENTMCNC: 33.5 G/DL (ref 31.5–36.5)
MCV RBC AUTO: 87 FL (ref 78–100)
PLATELET # BLD AUTO: 298 10E3/UL (ref 150–450)
RBC # BLD AUTO: 5.05 10E6/UL (ref 3.8–5.9)
WBC # BLD AUTO: 8 10E3/UL (ref 4–11)

## 2023-02-01 PROCEDURE — 90471 IMMUNIZATION ADMIN: CPT | Performed by: FAMILY MEDICINE

## 2023-02-01 PROCEDURE — 87389 HIV-1 AG W/HIV-1&-2 AB AG IA: CPT | Performed by: FAMILY MEDICINE

## 2023-02-01 PROCEDURE — 96127 BRIEF EMOTIONAL/BEHAV ASSMT: CPT | Performed by: FAMILY MEDICINE

## 2023-02-01 PROCEDURE — 83036 HEMOGLOBIN GLYCOSYLATED A1C: CPT | Performed by: FAMILY MEDICINE

## 2023-02-01 PROCEDURE — 86803 HEPATITIS C AB TEST: CPT | Performed by: FAMILY MEDICINE

## 2023-02-01 PROCEDURE — 36415 COLL VENOUS BLD VENIPUNCTURE: CPT | Performed by: FAMILY MEDICINE

## 2023-02-01 PROCEDURE — 99213 OFFICE O/P EST LOW 20 MIN: CPT | Mod: 25 | Performed by: FAMILY MEDICINE

## 2023-02-01 PROCEDURE — 85027 COMPLETE CBC AUTOMATED: CPT | Performed by: FAMILY MEDICINE

## 2023-02-01 PROCEDURE — 90715 TDAP VACCINE 7 YRS/> IM: CPT | Performed by: FAMILY MEDICINE

## 2023-02-01 PROCEDURE — G0145 SCR C/V CYTO,THINLAYER,RESCR: HCPCS | Performed by: FAMILY MEDICINE

## 2023-02-01 PROCEDURE — 80053 COMPREHEN METABOLIC PANEL: CPT | Performed by: FAMILY MEDICINE

## 2023-02-01 PROCEDURE — 90686 IIV4 VACC NO PRSV 0.5 ML IM: CPT | Performed by: FAMILY MEDICINE

## 2023-02-01 PROCEDURE — 84443 ASSAY THYROID STIM HORMONE: CPT | Performed by: FAMILY MEDICINE

## 2023-02-01 PROCEDURE — 99385 PREV VISIT NEW AGE 18-39: CPT | Mod: 25 | Performed by: FAMILY MEDICINE

## 2023-02-01 PROCEDURE — 90472 IMMUNIZATION ADMIN EACH ADD: CPT | Performed by: FAMILY MEDICINE

## 2023-02-01 RX ORDER — LAMOTRIGINE 25 MG/1
TABLET ORAL
Qty: 98 TABLET | Refills: 0 | Status: SHIPPED | OUTPATIENT
Start: 2023-02-01 | End: 2023-03-10

## 2023-02-01 RX ORDER — ALBUTEROL SULFATE 90 UG/1
2 AEROSOL, METERED RESPIRATORY (INHALATION) EVERY 6 HOURS PRN
Qty: 18 G | Refills: 1 | Status: SHIPPED | OUTPATIENT
Start: 2023-02-01 | End: 2023-05-18

## 2023-02-01 ASSESSMENT — PATIENT HEALTH QUESTIONNAIRE - PHQ9
10. IF YOU CHECKED OFF ANY PROBLEMS, HOW DIFFICULT HAVE THESE PROBLEMS MADE IT FOR YOU TO DO YOUR WORK, TAKE CARE OF THINGS AT HOME, OR GET ALONG WITH OTHER PEOPLE: VERY DIFFICULT
SUM OF ALL RESPONSES TO PHQ QUESTIONS 1-9: 21
SUM OF ALL RESPONSES TO PHQ QUESTIONS 1-9: 21

## 2023-02-01 ASSESSMENT — ANXIETY QUESTIONNAIRES
4. TROUBLE RELAXING: NEARLY EVERY DAY
8. IF YOU CHECKED OFF ANY PROBLEMS, HOW DIFFICULT HAVE THESE MADE IT FOR YOU TO DO YOUR WORK, TAKE CARE OF THINGS AT HOME, OR GET ALONG WITH OTHER PEOPLE?: VERY DIFFICULT
IF YOU CHECKED OFF ANY PROBLEMS ON THIS QUESTIONNAIRE, HOW DIFFICULT HAVE THESE PROBLEMS MADE IT FOR YOU TO DO YOUR WORK, TAKE CARE OF THINGS AT HOME, OR GET ALONG WITH OTHER PEOPLE: VERY DIFFICULT
1. FEELING NERVOUS, ANXIOUS, OR ON EDGE: NEARLY EVERY DAY
3. WORRYING TOO MUCH ABOUT DIFFERENT THINGS: NEARLY EVERY DAY
7. FEELING AFRAID AS IF SOMETHING AWFUL MIGHT HAPPEN: NEARLY EVERY DAY
GAD7 TOTAL SCORE: 18
5. BEING SO RESTLESS THAT IT IS HARD TO SIT STILL: MORE THAN HALF THE DAYS
7. FEELING AFRAID AS IF SOMETHING AWFUL MIGHT HAPPEN: NEARLY EVERY DAY
GAD7 TOTAL SCORE: 18
2. NOT BEING ABLE TO STOP OR CONTROL WORRYING: MORE THAN HALF THE DAYS
GAD7 TOTAL SCORE: 18
6. BECOMING EASILY ANNOYED OR IRRITABLE: MORE THAN HALF THE DAYS

## 2023-02-01 ASSESSMENT — PAIN SCALES - GENERAL: PAINLEVEL: MODERATE PAIN (4)

## 2023-02-01 NOTE — PROGRESS NOTES
Assessment & Plan   Shiloh was seen today for chronic pain, mental health problem and establish care.    Diagnoses and all orders for this visit:    Encounter for preventative adult health care examination  -     Adult Endocrinology  Referral; Future  -     Comprehensive metabolic panel; Future  -     CBC with platelets; Future  -     Hemoglobin A1c; Future  -     TSH with free T4 reflex; Future    Screening for HIV (human immunodeficiency virus)  -     HIV Antigen Antibody Combo; Future    Need for hepatitis C screening test  -     Hepatitis C Screen Reflex to HCV RNA Quant and Genotype; Future    Cervical cancer screening  -     Pap Screen only - recommended age 21 - 24 years    Bipolar disorder, current episode mixed, mild (H)  -     Adult Mental Health  Referral; Future  -     Adult Mental Health  Referral; Future  -     lamoTRIgine (LAMICTAL) 25 MG tablet; Take 1 tablet (25 mg) by mouth daily for 14 days, THEN 2 tablets (50 mg) daily for 14 days, THEN 4 tablets (100 mg) daily for 14 days.    Wheezing  -     albuterol (PROAIR HFA/PROVENTIL HFA/VENTOLIN HFA) 108 (90 Base) MCG/ACT inhaler; Inhale 2 puffs into the lungs every 6 hours as needed for shortness of breath, wheezing or cough    Non Binary  -     Adult Endocrinology  Referral; Future  -     [facility]  Referral; Future    Other orders  -     REVIEW OF HEALTH MAINTENANCE PROTOCOL ORDERS  -     TDAP VACCINE (Adacel, Boostrix)  -     INFLUENZA VACCINE IM > 6 MONTHS VALENT IIV4 (AFLURIA/FLUZONE)        Return in about 1 month (around 3/1/2023) for Follow-up visit.    Beau Roldan MD  [facility]    Sarah Platt is a 22 year old, presenting for the following health issues:  Chronic Pain, Mental Health Problem, and Establish Care      Mental Health Problem    History of Present Illness       Mental Health Follow-up:  Patient presents to follow-up on Depression &  Anxiety.Patient's depression since last visit has been:  Worse  The patient is having other symptoms associated with depression.  Patient's anxiety since last visit has been:  Worse  The patient is having other symptoms associated with anxiety.  Any significant life events: relationship concerns and health concerns  Patient is feeling anxious or having panic attacks.  Patient has no concerns about alcohol or drug use.    Reason for visit:  Mental health & possible chronic illness  Symptom onset:  More than a month    Lc Cheatham eats 2-3 servings of fruits and vegetables daily.Lc Cheatham consumes 3 sweetened beverage(s) daily.Lc Cheatham exercises with enough effort to increase Lc Cheatham's heart rate 60 or more minutes per day.  Lc Cheatham exercises with enough effort to increase Lc Cheatham's heart rate 4 days per week.   Lc Cheatham is taking medications regularly.    Today's PHQ-9         PHQ-9 Total Score: 21    PHQ-9 Q9 Thoughts of better off dead/self-harm past 2 weeks :   Not at all    How difficult have these problems made it for you to do your work, take care of things at home, or get along with other people: Very difficult  Today's MYNOR-7 Score: 18     Patient desires to discuss mental health concerns:  Patient reports struggling with PTSD,ADHD, anxiety and depression since late teens. Patient was seen by a psychiatrist.   Patient was taking gabapentin in the past but it caused memory loss. Never tried an selective serotonin reuptake inhibitor. Provider was suspecting bipolar vs. schizoaffective disorder vs borderline. Passive thoughts about suicide but nothing active. Never been hospitalized in the past. Patient tried Seroquel in the past but it made her groggy. Did not work as a mood stabilizer. Not interested in lithium because of concerns about NSAIDS use while taking lithium.     Also, concerned about asthma. Patient reports episodes of wheezing. Triggered by dust, mold, smoking and  "other environmental allergens. Patient's mother has asthma.     Trans-care: patient is non-binary, considering top surgery in 4-5 years. Desires to lose weight now.   Vaccines.     Review of Systems   Constitutional, HEENT, cardiovascular, pulmonary, gi and gu systems are negative, except as otherwise noted.      Objective    /75   Pulse 94   Temp 98  F (36.7  C) (Temporal)   Resp 16   Ht 1.702 m (5' 7\")   Wt 95.2 kg (209 lb 12.8 oz)   LMP 01/28/2023 (Exact Date)   SpO2 95%   BMI 32.86 kg/m    Body mass index is 32.86 kg/m .  Physical Exam   GENERAL: healthy, alert and no distress  NECK: no adenopathy, no asymmetry, masses, or scars and thyroid normal to palpation  RESP: lungs clear to auscultation - no rales, rhonchi or wheezes  CV: regular rate and rhythm, normal S1 S2, no S3 or S4, no murmur, click or rub, no peripheral edema and peripheral pulses strong  ABDOMEN: soft, nontender, no hepatosplenomegaly, no masses and bowel sounds normal  MS: no gross musculoskeletal defects noted, no edema    Results for orders placed or performed in visit on 02/01/23   Comprehensive metabolic panel     Status: Abnormal   Result Value Ref Range    Sodium 144 136 - 145 mmol/L    Potassium 4.7 3.4 - 5.3 mmol/L    Chloride 105 98 - 107 mmol/L    Carbon Dioxide (CO2) 27 22 - 29 mmol/L    Anion Gap 12 7 - 15 mmol/L    Urea Nitrogen 11.1 6.0 - 20.0 mg/dL    Creatinine 0.76 0.51 - 1.17 mg/dL    Calcium 10.2 (H) 8.6 - 10.0 mg/dL    Glucose 86 70 - 99 mg/dL    Alkaline Phosphatase 82 35 - 129 U/L    AST 24 10 - 50 U/L    ALT 25 10 - 50 U/L    Protein Total 7.7 6.4 - 8.3 g/dL    Albumin 4.6 3.5 - 5.2 g/dL    Bilirubin Total 0.3 <=1.2 mg/dL    GFR Estimate >90 >60 mL/min/1.73m2    Narrative    The sex of this patient cannot be reliably determined based on discrepancies in demographics (legal sex, sex assigned at birth, gender identity).  Both male and female reference ranges are provided where applicable.  Careful evaluation " of the patient s results as compared to the gender specific reference intervals is required in this setting.    CBC with platelets     Status: Normal   Result Value Ref Range    WBC Count 8.0 4.0 - 11.0 10e3/uL    RBC Count 5.05 3.80 - 5.90 10e6/uL    Hemoglobin 14.7 11.7 - 17.7 g/dL    Hematocrit 43.9 35.0 - 53.0 %    MCV 87 78 - 100 fL    MCH 29.1 26.5 - 33.0 pg    MCHC 33.5 31.5 - 36.5 g/dL    RDW 13.3 10.0 - 15.0 %    Platelet Count 298 150 - 450 10e3/uL    Narrative    The sex of this patient cannot be reliably determined based on discrepancies in demographics (legal sex, sex assigned at birth, gender identity).  Both male and female reference ranges are provided where applicable.  Careful evaluation of the patient s results as compared to the gender specific reference intervals is required in this setting.    Hemoglobin A1c     Status: Normal   Result Value Ref Range    Hemoglobin A1C 5.0 0.0 - 5.6 %   TSH with free T4 reflex     Status: Normal   Result Value Ref Range    TSH 1.20 0.30 - 4.20 uIU/mL   Hepatitis C Screen Reflex to HCV RNA Quant and Genotype     Status: Normal   Result Value Ref Range    Hepatitis C Antibody Nonreactive Nonreactive    Narrative    Assay performance characteristics have not been established for newborns, infants, and children.   HIV Antigen Antibody Combo     Status: Normal   Result Value Ref Range    HIV Antigen Antibody Combo Nonreactive Nonreactive   Pap Screen only - recommended age 21 - 24 years     Status: None   Result Value Ref Range    Interpretation        Negative for Intraepithelial Lesion or Malignancy (NILM)    Comment         Papanicolaou Test Limitations:  Cervical cytology is a screening test with limited sensitivity, and regular screening is critical for cancer prevention.  Pap tests are primarily effective for the diagnosis/prevention of squamous cell carcinoma, not adenocarcinoma or other cancers.        Specimen Adequacy       Satisfactory for evaluation,  endocervical/transformation zone component present    Clinical Information       none      Reflex Testing No     Previous Abnormal?       No      Performing Labs       The technical component of this testing was completed at [facility]

## 2023-02-02 LAB
ALBUMIN SERPL BCG-MCNC: 4.6 G/DL (ref 3.5–5.2)
ALP SERPL-CCNC: 82 U/L (ref 35–129)
ALT SERPL W P-5'-P-CCNC: 25 U/L (ref 10–50)
ANION GAP SERPL CALCULATED.3IONS-SCNC: 12 MMOL/L (ref 7–15)
AST SERPL W P-5'-P-CCNC: 24 U/L (ref 10–50)
BILIRUB SERPL-MCNC: 0.3 MG/DL
BUN SERPL-MCNC: 11.1 MG/DL (ref 6–20)
CALCIUM SERPL-MCNC: 10.2 MG/DL (ref 8.6–10)
CHLORIDE SERPL-SCNC: 105 MMOL/L (ref 98–107)
CREAT SERPL-MCNC: 0.76 MG/DL (ref 0.51–1.17)
DEPRECATED HCO3 PLAS-SCNC: 27 MMOL/L (ref 22–29)
GFR SERPL CREATININE-BSD FRML MDRD: >90 ML/MIN/1.73M2
GLUCOSE SERPL-MCNC: 86 MG/DL (ref 70–99)
HCV AB SERPL QL IA: NONREACTIVE
HIV 1+2 AB+HIV1 P24 AG SERPL QL IA: NONREACTIVE
POTASSIUM SERPL-SCNC: 4.7 MMOL/L (ref 3.4–5.3)
PROT SERPL-MCNC: 7.7 G/DL (ref 6.4–8.3)
SODIUM SERPL-SCNC: 144 MMOL/L (ref 136–145)
TSH SERPL DL<=0.005 MIU/L-ACNC: 1.2 UIU/ML (ref 0.3–4.2)

## 2023-02-03 ENCOUNTER — TELEPHONE (OUTPATIENT)
Dept: FAMILY MEDICINE | Facility: CLINIC | Age: 23
End: 2023-02-03
Payer: COMMERCIAL

## 2023-02-03 NOTE — TELEPHONE ENCOUNTER
Prior Authorization Retail Medication Request    Medication/Dose: albuterol (PROAIR HFA/PROVENTIL HFA/VENTOLIN HFA) 108 (90 Base) MCG/ACT inhaler  ICD code (if different than what is on RX):  Wheezing [R06.2]   Previously Tried and Failed:  unknown  Rationale:  unknown    Insurance Name:  [employer]  Insurance ID:  [id number]

## 2023-02-06 ENCOUNTER — TELEPHONE (OUTPATIENT)
Dept: FAMILY MEDICINE | Facility: CLINIC | Age: 23
End: 2023-02-06

## 2023-02-06 ENCOUNTER — TELEPHONE (OUTPATIENT)
Dept: FAMILY MEDICINE | Facility: CLINIC | Age: 23
End: 2023-02-06
Payer: COMMERCIAL

## 2023-02-06 ASSESSMENT — PATIENT HEALTH QUESTIONNAIRE - PHQ9
SUM OF ALL RESPONSES TO PHQ QUESTIONS 1-9: 14
SUM OF ALL RESPONSES TO PHQ QUESTIONS 1-9: 14
10. IF YOU CHECKED OFF ANY PROBLEMS, HOW DIFFICULT HAVE THESE PROBLEMS MADE IT FOR YOU TO DO YOUR WORK, TAKE CARE OF THINGS AT HOME, OR GET ALONG WITH OTHER PEOPLE: VERY DIFFICULT

## 2023-02-06 NOTE — TELEPHONE ENCOUNTER
Telephone Intake--[facility] Adult  Date: 2023  Client Name:  Shiloh  Preferred Name: Lc      MRN: [id number]  : 2000       Age: 22      Presenting Problem / Reason for Assessment   (Clinical History &Symptoms):     Information Session Only - HRT  Non Binary - they, them  Androgenous  Family Support: None, [place]  Has a large supportive family of friends        Clarify their goals/expected services from [facility] medical care--what are they looking for?    Clarify with patient (as necessary) that we are not a primary care clinic and that we do not have a surgeon. We strongly recommend that patients have a primary care doctor for their overall health needs, and we can refer to primary care clinics. We can assist with surgery referrals.  Further down road      Length of time experiencing symptoms:  Since Early Childhood      Seen Other Providers for Gender (if so, where):    M.D/other.(hormones) : NO  --If yes, request records from the provider at the 1st session.    Therapist:  NO  --if yes, request a referral or summary letter from the therapist at the 1st session..    Psychiatrist:  NO  --if yes,, request records from the provider at the 1st session..      Other Medical/Mental Health Diagnoses:  NO        If needed, clarify if patient calling from group home or other supervised living arrangement    Note if patient has no mental health or cognitive diagnosis, but phone behavior suggests impairment      Medications:  Lamotrigine, Albuterol        Primary Care Provider:  Sandee Daley MD                                                [facility]    --If multiple medical conditions, request recent records from primary doctor at the 1st session..      Referral Source:      Follow Up:        Please Verify Registration    If patient has [employer] or [employer], send to .     Prep Welcome Packet and have patient come half hour beforehand to fill out forms in packet  (health history form, transgender history, Safety Screening, PHQ9, and MYNOR-7.

## 2023-02-06 NOTE — TELEPHONE ENCOUNTER
New PT interested in HRT, pt has referral in chart.     PT's information in chart is correct and up to date. PT has been informed that Rebekah will be reaching out in next week to start intake process.

## 2023-02-07 ENCOUNTER — IMMUNIZATION (OUTPATIENT)
Dept: FAMILY MEDICINE | Facility: CLINIC | Age: 23
End: 2023-02-07
Payer: COMMERCIAL

## 2023-02-07 VITALS — TEMPERATURE: 98.6 F

## 2023-02-07 LAB
BKR LAB AP GYN ADEQUACY: NORMAL
BKR LAB AP GYN INTERPRETATION: NORMAL
BKR LAB AP HPV REFLEX: NO
BKR LAB AP PREVIOUS ABNORMAL: NORMAL
PATH REPORT.COMMENTS IMP SPEC: NORMAL
PATH REPORT.COMMENTS IMP SPEC: NORMAL
PATH REPORT.RELEVANT HX SPEC: NORMAL

## 2023-02-07 PROCEDURE — 0134A COVID-19 VACCINE BIVALENT BOOSTER 18+ (MODERNA): CPT

## 2023-02-07 PROCEDURE — 91313 COVID-19 VACCINE BIVALENT BOOSTER 18+ (MODERNA): CPT

## 2023-02-07 NOTE — NURSING NOTE
Prior to immunization administration, verified patients identity using patient s name and date of birth. Please see Immunization Activity for additional information.     Screening Questionnaire for Adult Immunization    Are you sick today?   No   Do you have allergies to medications, food, a vaccine component or latex?   No   Have you ever had a serious reaction after receiving a vaccination?   No   Do you have a long-term health problem with heart, lung, kidney, or metabolic disease (e.g., diabetes), asthma, a blood disorder, no spleen, complement component deficiency, a cochlear implant, or a spinal fluid leak?  Are you on long-term aspirin therapy?   No   Do you have cancer, leukemia, HIV/AIDS, or any other immune system problem?   No   Do you have a parent, brother, or sister with an immune system problem?   No   In the past 3 months, have you taken medications that affect  your immune system, such as prednisone, other steroids, or anticancer drugs; drugs for the treatment of rheumatoid arthritis, Crohn s disease, or psoriasis; or have you had radiation treatments?   No   Have you had a seizure, or a brain or other nervous system problem?   No   During the past year, have you received a transfusion of blood or blood    products, or been given immune (gamma) globulin or antiviral drug?   No   For women: Are you pregnant or is there a chance you could become       pregnant during the next month?   No   Have you received any vaccinations in the past 4 weeks?   No     Immunization questionnaire answers were all negative.        Per orders of Dr. DURAN, injection of MODERNA given by Papo Vásquez MA. Patient instructed to remain in clinic for 15 minutes afterwards, and to report any adverse reaction to me immediately.       Screening performed by Papo Vásquez MA on 2/7/2023 at 8:32 AM.

## 2023-02-07 NOTE — PROGRESS NOTES
Answers for HPI/ROS submitted by the patient on 2/6/2023  If you checked off any problems, how difficult have these problems made it for you to do your work, take care of things at home, or get along with other people?: Very difficult  PHQ9 TOTAL SCORE: 14

## 2023-02-08 NOTE — TELEPHONE ENCOUNTER
Prior Authorization Not Needed per Insurance    Medication: albuterol (PROAIR HFA/PROVENTIL HFA/VENTOLIN HFA) 108 (90 Base) MCG/ACT inhaler  Insurance Company: [employer] - Phone [phone] Fax [phone]  Expected CoPay:      Pharmacy Filling the Rx: [id number] - [place], [place] [facility] [address] [facility]  Pharmacy Notified: Yes  Patient Notified: Yes  **Instructed pharmacy to notify patient when script is ready to /ship.**      I called the pharmacy and they received a paid claim.

## 2023-02-15 ASSESSMENT — ANXIETY QUESTIONNAIRES
6. BECOMING EASILY ANNOYED OR IRRITABLE: SEVERAL DAYS
7. FEELING AFRAID AS IF SOMETHING AWFUL MIGHT HAPPEN: NEARLY EVERY DAY
IF YOU CHECKED OFF ANY PROBLEMS ON THIS QUESTIONNAIRE, HOW DIFFICULT HAVE THESE PROBLEMS MADE IT FOR YOU TO DO YOUR WORK, TAKE CARE OF THINGS AT HOME, OR GET ALONG WITH OTHER PEOPLE: VERY DIFFICULT
5. BEING SO RESTLESS THAT IT IS HARD TO SIT STILL: SEVERAL DAYS
GAD7 TOTAL SCORE: 15
4. TROUBLE RELAXING: MORE THAN HALF THE DAYS
7. FEELING AFRAID AS IF SOMETHING AWFUL MIGHT HAPPEN: NEARLY EVERY DAY
3. WORRYING TOO MUCH ABOUT DIFFERENT THINGS: MORE THAN HALF THE DAYS
1. FEELING NERVOUS, ANXIOUS, OR ON EDGE: NEARLY EVERY DAY
8. IF YOU CHECKED OFF ANY PROBLEMS, HOW DIFFICULT HAVE THESE MADE IT FOR YOU TO DO YOUR WORK, TAKE CARE OF THINGS AT HOME, OR GET ALONG WITH OTHER PEOPLE?: VERY DIFFICULT
GAD7 TOTAL SCORE: 15
2. NOT BEING ABLE TO STOP OR CONTROL WORRYING: NEARLY EVERY DAY
GAD7 TOTAL SCORE: 15

## 2023-02-15 ASSESSMENT — PATIENT HEALTH QUESTIONNAIRE - PHQ9
SUM OF ALL RESPONSES TO PHQ QUESTIONS 1-9: 20
SUM OF ALL RESPONSES TO PHQ QUESTIONS 1-9: 20
10. IF YOU CHECKED OFF ANY PROBLEMS, HOW DIFFICULT HAVE THESE PROBLEMS MADE IT FOR YOU TO DO YOUR WORK, TAKE CARE OF THINGS AT HOME, OR GET ALONG WITH OTHER PEOPLE: VERY DIFFICULT

## 2023-02-16 ENCOUNTER — VIRTUAL VISIT (OUTPATIENT)
Dept: PSYCHOLOGY | Facility: CLINIC | Age: 23
End: 2023-02-16
Attending: FAMILY MEDICINE
Payer: COMMERCIAL

## 2023-02-16 DIAGNOSIS — F32.A DEPRESSION, UNSPECIFIED DEPRESSION TYPE: Primary | ICD-10-CM

## 2023-02-16 PROCEDURE — 90837 PSYTX W PT 60 MINUTES: CPT | Mod: VID

## 2023-02-16 NOTE — PROGRESS NOTES
[facility]   Mental Health & Addiction Services     Progress Note - Initial Visit    Patient  Name:  Shiloh Cheatham (Alex) Date: 2023         Service Type: Individual     Visit Start Time: 9:56 AM  Visit End Time: 11:00 AM     Visit #: 1    Attendees: Client attended alone    Service Modality:  Video Visit:      Provider verified identity through the following two step process.  Patient provided:  Patient  and Patient address    Telemedicine Visit: The patient's condition can be safely assessed and treated via synchronous audio and visual telemedicine encounter.      Reason for Telemedicine Visit: Services only offered telehealth    Originating Site (Patient Location): Patient's home    Distant Site (Provider Location): Provider Remote Setting- Home Office    Consent:  The patient/guardian has verbally consented to: the potential risks and benefits of telemedicine (video visit) versus in person care; bill my insurance or make self-payment for services provided; and responsibility for payment of non-covered services.     Patient would like the video invitation sent by:  My Chart    Mode of Communication:  [facility]    Distant Location (Provider):  Off-site    As the provider I attest to compliance with applicable laws and regulations related to telemedicine.       DATA:   Extended Session (53+ minutes):   - Patient's presenting concerns require more intensive intervention than could be completed within the usual service   Interactive Complexity: No   Crisis: No      Presenting Concerns/  Current Stressors:  Patient reports they are possibly going through a divorce, and it has been stressful for them. They report they have been with their partner for the past four years, and have felt underapreciated. Patient also reports previous history of therapy and psychiatric care. Lc states they would like for this experience with therapy to be targeted more towards impulse  and stress management. They would also like to process through past trauma in future sessions. Today, patient and therapist started the diagnostic assessment, but were unable to complete due to time constraints.  Therapist assessed for risk and safety - no concerns at this time.  Patient and therapist will continue with the DA during next session.    ASSESSMENT:  Mental Status Assessment:  Appearance:   Appropriate   Eye Contact:   Good   Psychomotor Behavior: Normal   Attitude:   Cooperative  Interested Playful Attentive  Orientation:   All  Speech   Rate / Production: Normal/ Responsive   Volume:  Normal   Mood:    Normal  Affect:    Appropriate   Thought Content:  Clear   Thought Form:  Coherent   Insight:    Good     Answers for HPI/ROS submitted by the patient on 2/15/2023  If you checked off any problems, how difficult have these problems made it for you to do your work, take care of things at home, or get along with other people?: Very difficult  PHQ9 TOTAL SCORE: 20  MYNOR 7 TOTAL SCORE: 15    Safety Issues and Plan for Safety and Risk Management:   Provider and patient will complete the lifetime [place]-Suicide Severity Scale in the next session.   Patient denies current fears or concerns for personal safety.  Patient denies current or recent suicidal ideation or behaviors.  Patient denies current or recent self injurious behavior or ideation.  Patient denies other safety concerns.  Recommended that patient call 911 or go to the local ED should there be a change in any of these risk factors.  Patient reports there are no firearms in the house.     Diagnostic Criteria:  311 (F32.9) Unspecified Depressive Disorder      DSM5 Diagnoses: (Sustained by DSM5 Criteria Listed Above)  Diagnoses: 311 (F32.9) Unspecified Depressive Disorder   Psychosocial & Contextual Factors: Patient reports possibly going through a divorce.   WHODAS 2.0 (12 item):   WHODAS 2.0 Total Score 2/15/2023   Total Score 39   Total Score  Aidan 39     Intervention:   Educated on treatment planning and started identifying goals and interventions for treatment plan  Collateral Reports Completed:  Not Applicable      PLAN: (Homework, other):  1. Provider will continue Diagnostic Assessment.  Patient was given the following to do until next session:  Patient was encouraged to create a script in preparation for the conversation they are to have with their partner.     2. Provider recommended the following referrals: NA.      3.  Suicide Risk and Safety Concerns were assessed for Lc Cheatham.    Provider and patient will create a safety plan during the next session.       PATRICIA IRAHETA  February 16, 2023        This note has been reviewed and I agree with the plan of care. This note is co-signed by CHARLENE Melendrez, [facility], Supervisor, on: February 20, 2023

## 2023-02-17 PROBLEM — F31.61 BIPOLAR DISORDER, CURRENT EPISODE MIXED, MILD (H): Status: ACTIVE | Noted: 2023-02-17

## 2023-03-02 ENCOUNTER — OFFICE VISIT (OUTPATIENT)
Dept: FAMILY MEDICINE | Facility: CLINIC | Age: 23
End: 2023-03-02
Payer: COMMERCIAL

## 2023-03-02 DIAGNOSIS — F64.9 GENDER DYSPHORIA: Primary | ICD-10-CM

## 2023-03-02 DIAGNOSIS — Z78.9 FEMALE-TO-MALE TRANSGENDER PERSON: ICD-10-CM

## 2023-03-02 PROCEDURE — 99214 OFFICE O/P EST MOD 30 MIN: CPT | Performed by: FAMILY MEDICINE

## 2023-03-02 NOTE — PROGRESS NOTES
Sarah Platt is a 22 year old , presenting for the following health issues:  No chief complaint on file.       Information Session on Gender Affirming Hormone Therapy    ID: 22 year old  CC: information on GAHT  HPI    They identify as masculine androgynous, and have the following goals for hormone therapy:    Want: more masucline body shape, amenorrhea, mild drop in voice  Do not want; balding  Indifferent to other physical changes    They would like to know more about hormone options to achieve these goals and the process for starting hormone therapy.    A/P   1. Gender dysphoria  Counseled patient on the range of testosterone needed, usually lower dose to achieve above goals, with adjustments to dose as needed, while using other medications for menstrual suppression. Counseled that usually requires more binary dosing to achieve amenorrhea with testosterone alone.     Reviewed the process for starting GAHT at [facility], including gender and psychosocial history session.  Follow up when ready to begin assessment/intake process

## 2023-03-09 ASSESSMENT — ANXIETY QUESTIONNAIRES
3. WORRYING TOO MUCH ABOUT DIFFERENT THINGS: NEARLY EVERY DAY
GAD7 TOTAL SCORE: 18
5. BEING SO RESTLESS THAT IT IS HARD TO SIT STILL: MORE THAN HALF THE DAYS
IF YOU CHECKED OFF ANY PROBLEMS ON THIS QUESTIONNAIRE, HOW DIFFICULT HAVE THESE PROBLEMS MADE IT FOR YOU TO DO YOUR WORK, TAKE CARE OF THINGS AT HOME, OR GET ALONG WITH OTHER PEOPLE: VERY DIFFICULT
8. IF YOU CHECKED OFF ANY PROBLEMS, HOW DIFFICULT HAVE THESE MADE IT FOR YOU TO DO YOUR WORK, TAKE CARE OF THINGS AT HOME, OR GET ALONG WITH OTHER PEOPLE?: VERY DIFFICULT
7. FEELING AFRAID AS IF SOMETHING AWFUL MIGHT HAPPEN: NEARLY EVERY DAY
4. TROUBLE RELAXING: MORE THAN HALF THE DAYS
1. FEELING NERVOUS, ANXIOUS, OR ON EDGE: NEARLY EVERY DAY
2. NOT BEING ABLE TO STOP OR CONTROL WORRYING: NEARLY EVERY DAY
7. FEELING AFRAID AS IF SOMETHING AWFUL MIGHT HAPPEN: NEARLY EVERY DAY
GAD7 TOTAL SCORE: 18
6. BECOMING EASILY ANNOYED OR IRRITABLE: MORE THAN HALF THE DAYS
GAD7 TOTAL SCORE: 18

## 2023-03-09 ASSESSMENT — PATIENT HEALTH QUESTIONNAIRE - PHQ9
10. IF YOU CHECKED OFF ANY PROBLEMS, HOW DIFFICULT HAVE THESE PROBLEMS MADE IT FOR YOU TO DO YOUR WORK, TAKE CARE OF THINGS AT HOME, OR GET ALONG WITH OTHER PEOPLE: VERY DIFFICULT
SUM OF ALL RESPONSES TO PHQ QUESTIONS 1-9: 21
10. IF YOU CHECKED OFF ANY PROBLEMS, HOW DIFFICULT HAVE THESE PROBLEMS MADE IT FOR YOU TO DO YOUR WORK, TAKE CARE OF THINGS AT HOME, OR GET ALONG WITH OTHER PEOPLE: VERY DIFFICULT

## 2023-03-09 NOTE — PROGRESS NOTES
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    National Suicide Prevention Lifeline   0-839-980-TALK (7834) or dial 158    Crisis Text Line    www.crisistextline.org  Text HOME to 366369 from anywhere in the [place], anytime, about any type of crisis. A live, trained crisis counselor will receive the text and respond quickly.    [employer] Lifeline for LGBTQ Youth  A national crisis intervention and suicide lifeline for LGBTQ youth under 25. Provides a safe place to talk without judgement.   Call 1-120.846.9895; text START to 260178 or visit www.thetrevorproject.org to talk to a trained counselor.    [facility] Mental Health Hotline call or text, 3-573-9-INFY8DJYW (1-687.120.9209)   For [facility] crisis numbers, visit the [facility] website at:  https://[place].gov/dhs/people-we-serve/adults/health-care/mental-health/resources/crisis-contacts.jsp

## 2023-03-10 ENCOUNTER — TELEPHONE (OUTPATIENT)
Dept: NEUROPSYCHOLOGY | Facility: CLINIC | Age: 23
End: 2023-03-10
Payer: COMMERCIAL

## 2023-03-10 ENCOUNTER — VIRTUAL VISIT (OUTPATIENT)
Dept: PSYCHIATRY | Facility: CLINIC | Age: 23
End: 2023-03-10
Attending: FAMILY MEDICINE
Payer: COMMERCIAL

## 2023-03-10 ENCOUNTER — VIRTUAL VISIT (OUTPATIENT)
Dept: BEHAVIORAL HEALTH | Facility: CLINIC | Age: 23
End: 2023-03-10
Payer: COMMERCIAL

## 2023-03-10 DIAGNOSIS — F25.1 SCHIZOAFFECTIVE DISORDER, DEPRESSIVE TYPE (H): Primary | ICD-10-CM

## 2023-03-10 DIAGNOSIS — F33.1 DEPRESSION, MAJOR, RECURRENT, MODERATE (H): Primary | ICD-10-CM

## 2023-03-10 DIAGNOSIS — G31.84 MILD NEUROCOGNITIVE DISORDER: ICD-10-CM

## 2023-03-10 DIAGNOSIS — F60.3 BORDERLINE PERSONALITY DISORDER (H): ICD-10-CM

## 2023-03-10 DIAGNOSIS — Z87.820 HX OF TRAUMATIC BRAIN INJURY: ICD-10-CM

## 2023-03-10 DIAGNOSIS — Z79.899 HIGH RISK MEDICATION USE: ICD-10-CM

## 2023-03-10 PROCEDURE — 99417 PROLNG OP E/M EACH 15 MIN: CPT | Mod: VID | Performed by: NURSE PRACTITIONER

## 2023-03-10 PROCEDURE — 90791 PSYCH DIAGNOSTIC EVALUATION: CPT | Mod: VID | Performed by: SOCIAL WORKER

## 2023-03-10 PROCEDURE — 99205 OFFICE O/P NEW HI 60 MIN: CPT | Mod: VID | Performed by: NURSE PRACTITIONER

## 2023-03-10 RX ORDER — ARIPIPRAZOLE 5 MG/1
5 TABLET ORAL DAILY
Qty: 30 TABLET | Refills: 1 | Status: SHIPPED | OUTPATIENT
Start: 2023-03-10 | End: 2023-04-26

## 2023-03-10 ASSESSMENT — COLUMBIA-SUICIDE SEVERITY RATING SCALE - C-SSRS
TOTAL  NUMBER OF ABORTED OR SELF INTERRUPTED ATTEMPTS LIFETIME: NO
6. HAVE YOU EVER DONE ANYTHING, STARTED TO DO ANYTHING, OR PREPARED TO DO ANYTHING TO END YOUR LIFE?: NO
ATTEMPT LIFETIME: NO
1. HAVE YOU WISHED YOU WERE DEAD OR WISHED YOU COULD GO TO SLEEP AND NOT WAKE UP?: NO
TOTAL  NUMBER OF INTERRUPTED ATTEMPTS LIFETIME: NO
2. HAVE YOU ACTUALLY HAD ANY THOUGHTS OF KILLING YOURSELF?: NO

## 2023-03-10 NOTE — ASSESSMENT & PLAN NOTE
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  Meets criteria for schizoaffective disorder depressive type.  In addition she has experienced multiple sources of trauma.  Meets criteria for borderline personality disorder.  This is the first time essentially seeking out mental health treatment.  At this time will start Abilify titrated to 5 mg.  Recommend neuropsychological testing that can also address potential ADHD.  She is concerned with an underlying autism and will have them consider this while testing also.  She has done 1 session of DBT historically.  She would benefit from completing a DBT program.  Again this is her first time seriously seeking out treatment and will need to address both the psychological and medical comorbidities and how they are impacting the clinical picture.  We will follow-up in 6 weeks.  Order placed for neuropsychiatric testing.  In addition information was sent to them via [email or web address] on other options for testing centers within the [facility]    The following link was sent to patient for more information on BLPD:  https://www.nim.nih.gov/health/topics/borderline-personality-disorder    Books: \"Stop Walking on Eggshells\" and \"I Hate You, Don't Leave me\" are good for families    "

## 2023-03-10 NOTE — TELEPHONE ENCOUNTER
Neuropsychology referral declined. Unfortunately, our clinic does not see patients for diagnostic questions related to learning disabilities, ADHD, and/or autism spectrum disorders. In short, we do not currently perform assessments solely for the retrospective diagnosis of developmental conditions in adults. Ref prov notified via inbasket message.    Community neuropsychologists and psychologists include:    - Dr. Pati [email or web address]  [phone]    - [facility] - with locations throughout the [facility] area: [phone].    - [facility] - with locations throughout the [facility] area: [phone].    Nichole You  Psychometrist

## 2023-03-10 NOTE — PROGRESS NOTES
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 ER  Crisis Text Line for any crisis  send this-   To: 018629   [facility]  [phone]  National Suicide Prevention Lifeline: 417.746.7990 (TTY: 108.228.6200). Call anytime for help.  (www.suicidepreventionlifeline.org)  National [place] on Mental Illness (www.majo.org): 844-377-5160 or 082-768-1438.   Mental Health Association (www.mentalhealth.org): 570.882.4640 or 728-403-4055.  [place] Crisis Text Line: Text [place] to 063362  Suicide LifeLine Chat: suicideSparkroad.org/chat    ADMINISTRATIVE BILLIN min spent on the date of the encounter in chart review, patient visit, review of tests, documentation, care coordination, and/or discussion with other providers about the issues documented above.    Video/Phone Start Time:  905  Video/Phone End Time:  9:59 AM    Greater than 50% of time was spent in counseling and coordination of care regarding above diagnoses and treatment plan.     Patient Status:  Our psychiatry providers act as a specialty service for Primary Care Providers in the  [facility] that seek to optimize medications for unstable patients.  Once medications have been optimized, our providers discharge the patient back to the referring Primary Care Provider for ongoing medication management.  This type of system allows our providers to serve a high volume of patients. At this time  Patient will continue to be seen for ongoing consultation and stabilization.    Signed:   Carolin Larkin DNP, APRN, FMFARHANP-[facility] ([facility]PS)   Chart documentation done in part with Dragon Voice Recognition software.  Although reviewed after completion, some word and grammatical errors may remain.

## 2023-03-10 NOTE — PATIENT INSTRUCTIONS
**For crisis resources, please see the information at the end of this document**     Thank you for coming to the [facility] [facility].    TREATMENT PLAN:    MEDICATIONS:   - start Abilify titrated to 5 mg    -PSYCHOEDUCATION: Risks of antipsychotic medications:  metabolic disorder and movement disorder, and the need for blood monitoring of sugar and lipids/cholesterol while taking the medication.       CONSULTS/REFERRALS:   Continue therapy     LABS/PROCEDURES: lipids ordered.   Please call your [place] clinic and ask for a lab only appointment at your earliest convenience.  If your results are reassuring or normal they will be mailed to you or sent through [facility] within 7 days. If the lab tests need quick action we will call you with the results. The phone number we will call with results is # [phone].      FOLLOW UP: Schedule an appointment with me in six weeks  or sooner as needed.  The intake team should be calling you to schedule.  If you dont hear from them, or they were unable to reach you, please call [phone] to schedule.  Follow up with primary care provider as planned or for acute medical concerns.  Call the psychiatric nurse line with medication questions or concerns at [phone].      Medication Refills:  If you need any refills please call your pharmacy and they will contact us. Our fax number for refills is [phone]. Please allow three business for refill processing. If you need to  your refill at a new pharmacy, please contact the new pharmacy directly. The new pharmacy will help you get your medications transferred.     [facility] Assistance [phone]  Financial Assistance [phone]  [facility] Billing [phone]  Central Billing Office, [facility]: [phone]  [place] Billing [phone]  Medical Records [phone]  [place] Patient Bill of Rights  [email or web address]       MENTAL HEALTH CRISIS RESOURCES:  For a emergency help, please call 911 or go to the nearest Emergency Department.     Emergency Walk-In Options:   EmPATH Unit @ [facility] ([place]): [phone] - Specialized mental health emergency area designed to be calming  [facility] ([place]): [phone]  [facility] ([place]): [phone]  [facility]): [phone]    National Crisis Information: Call 988 Suicide and Crisis Lifeline  Crisis Text Line (free 24/7):  call **CRISIS (**743397) Crisis or use the texting option by texting 910258.   National Suicide Prevention Lifeline: Call 988  Poison Control Center: 5-322-307-4287  Trans Lifeline: 9-092-612-9842 - Hotline for transgender people of all ages  The [employer]: 0-912-862-6632 - Hotline for LGBT youth   List of all [facility] resources:   https://[place].gov/dhs/people-we-serve/adults/health-care/mental-health/resources/crisis-contacts.jsp    For Non-Emergency Support:   Fast Tracker: Mental Health & Substance Use Disorder Resources -   https://www.fasttrackermn.org/       Again thank you for choosing [facility] MENTAL HEALTH & ADDICTION [facility] and please let us know how we can best partner with you to improve you and your family's health.    You may be receiving a survey regarding this appointment. We would love to have your feedback, both positive and negative. The survey is done by an external company, so your answers are anonymous.

## 2023-03-14 ENCOUNTER — VIRTUAL VISIT (OUTPATIENT)
Dept: PSYCHOLOGY | Facility: CLINIC | Age: 23
End: 2023-03-14
Payer: COMMERCIAL

## 2023-03-14 DIAGNOSIS — F32.A DEPRESSION, UNSPECIFIED DEPRESSION TYPE: Primary | ICD-10-CM

## 2023-03-14 PROCEDURE — 90834 PSYTX W PT 45 MINUTES: CPT | Mod: VID

## 2023-03-15 NOTE — PROGRESS NOTES
[facility]                                     Progress Note    Patient Name: Shiloh Cheatham  Date: 3/14/23         Service Type: Individual      Session Start Time: 8:00 AM   Session End Time: 8:52     Session Length: 52 Minutes     Session #: 2    Attendees: Client attended alone    Service Modality:  Video Visit:      Provider verified identity through the following two step process.  Patient provided:  Patient  and Patient address    Telemedicine Visit: The patient's condition can be safely assessed and treated via synchronous audio and visual telemedicine encounter.      Reason for Telemedicine Visit: Services only offered telehealth    Originating Site (Patient Location): Patient's home    Distant Site (Provider Location): Provider Remote Setting- Home Office    Consent:  The patient/guardian has verbally consented to: the potential risks and benefits of telemedicine (video visit) versus in person care; bill my insurance or make self-payment for services provided; and responsibility for payment of non-covered services.     Patient would like the video invitation sent by:  My Chart    Mode of Communication:  Video Conference via [facility]    Distant Location (Provider):  Off-site    As the provider I attest to compliance with applicable laws and regulations related to telemedicine.    DATA  Interactive Complexity: No  Crisis: No        Progress Since Last Session (Related to Symptoms / Goals / Homework):   Symptoms: Improving Patient reports recent increase in symptoms due to a recent psychiatry appointment.    Homework: Achieved / completed to satisfaction      Episode of Care Goals: Minimal progress - PREPARATION (Decided to change - considering how); Intervened by negotiating a change plan and determining options / strategies for behavior change, identifying triggers, exploring social supports, and working towards setting a date to begin behavior change     Current / Ongoing  Stressors and Concerns:   Patient reports that their cat recently got sick, and needed to be taken to the vet.  Patient also reports having conversation with their partner and it going well. They report they have decided to separate. Patient also reports they were up for a promotion, but due to their mental health worsening they felt as though they could not take the promotion.  Lc reports feeling frustrated and irritated about who may take the promotion instead of them.  Patient also reports seeing their psychiatrist and being prescribed Abilify and gaining knowledge they may fit the criteria for different diagnoses.  They report feeling validated receiving a new medication and the possible diagnoses.  Patient also reports they are looking into additional testing for for the possible diagnoses.     Treatment Objective(s) Addressed in This Session:   Increase interest, engagement, and pleasure in doing things  Identify negative self-talk and behaviors: challenge core beliefs, myths, and actions  Diagnostic Assessment      Intervention:   Motivational Interviewing    MI Intervention: Expressed Empathy/Understanding, Supported Autonomy, Collaboration, Evocation and Open-ended questions     Change Talk Expressed by the Patient: Desire to change Reasons to change    Provider Response to Change Talk: E - Evoked more info from patient about behavior change and A - Affirmed patient's thoughts, decisions, or attempts at behavior change      Assessments completed prior to visit:  The following assessments were completed by patient for this visit:  PHQ9:   PHQ-9 SCORE 11/4/2019 2/3/2020 2/1/2023 2/6/2023 2/15/2023 3/9/2023 3/9/2023   PHQ-9 Total Score MyChart - - 21 (Severe depression) 14 (Moderate depression) 20 (Severe depression) - 21 (Severe depression)   PHQ-9 Total Score 15 14 21 14 20 21 21   PHQ-A Total Score - - - - - - -     GAD7:   MYNOR-7 SCORE 5/1/2019 2/1/2023 2/15/2023 3/9/2023   Total Score - 18 (severe  anxiety) 15 (severe anxiety) 18 (severe anxiety)   Total Score 15 18 15 18     PROMIS 10-Global Health (only subscores and total score):   PROMIS-10 Scores Only 2/15/2023 3/9/2023   Global Mental Health Score 6 5   Global Physical Health Score 12 12   PROMIS TOTAL - SUBSCORES 18 17         ASSESSMENT: Current Emotional / Mental Status (status of significant symptoms):   Risk status (Self / Other harm or suicidal ideation)   Patient denies current fears or concerns for personal safety.   Patient denies current or recent suicidal ideation or behaviors.   Patient denies current or recent homicidal ideation or behaviors.   Patient denies current or recent self injurious behavior or ideation.   Patient denies other safety concerns.   Patient reports there has been no change in risk factors since their last session.     Patient reports there has been no change in protective factors since their last session.     Recommended that patient call 911 or go to the local ED should there be a change in any of these risk factors.     Appearance:   Appropriate    Eye Contact:   Good    Psychomotor Behavior: Normal    Attitude:   Cooperative  Interested Playful Pleasant   Orientation:   All   Speech    Rate / Production: Normal     Volume:  Normal    Mood:    Normal   Affect:    Appropriate    Thought Content:  Clear    Thought Form:  Coherent  Logical    Insight:    Good      Medication Review:   Changes to psychiatric medications, see updated Medication List in EPIC.      Medication Compliance:   Yes     Changes in Health Issues:   None reported     Chemical Use Review:   Substance Use: Chemical use reviewed, no active concerns identified      Tobacco Use: No current tobacco use.      Diagnosis:  1. Depression, unspecified depression type        Collateral Reports Completed:   Not Applicable    PLAN: (Patient Tasks / Therapist Tasks / Other)  Patient was encouraged to continue researching and looking into locations for further  psychological testing.         PATRICIA IRAHETA                                                       This note has been reviewed and I agree with the plan of care. This note is co-signed by CHARLENE Melendrez, LICSW, Supervisor, on: March 15, 2023

## 2023-03-21 ENCOUNTER — VIRTUAL VISIT (OUTPATIENT)
Dept: PSYCHOLOGY | Facility: CLINIC | Age: 23
End: 2023-03-21
Payer: COMMERCIAL

## 2023-03-21 DIAGNOSIS — F60.3 BORDERLINE PERSONALITY DISORDER (H): ICD-10-CM

## 2023-03-21 DIAGNOSIS — F25.1 SCHIZOAFFECTIVE DISORDER, DEPRESSIVE TYPE (H): Primary | ICD-10-CM

## 2023-03-21 PROCEDURE — 90834 PSYTX W PT 45 MINUTES: CPT | Mod: VID

## 2023-03-21 NOTE — PROGRESS NOTES
[facility]                                     Progress Note    Patient Name: Shiloh Cheatham (Alex)  Date: 3/21/23         Service Type: Individual      Session Start Time: 8:00 AM   Session End Time: 8:52     Session Length: 52 Minutes     Session #: 3    Attendees: Client attended alone    Service Modality:  Video Visit:      Provider verified identity through the following two step process.  Patient provided:  Patient  and Patient address    Telemedicine Visit: The patient's condition can be safely assessed and treated via synchronous audio and visual telemedicine encounter.      Reason for Telemedicine Visit: Services only offered telehealth    Originating Site (Patient Location): Patient's home    Distant Site (Provider Location): Provider Remote Setting- Home Office    Consent:  The patient/guardian has verbally consented to: the potential risks and benefits of telemedicine (video visit) versus in person care; bill my insurance or make self-payment for services provided; and responsibility for payment of non-covered services.     Patient would like the video invitation sent by:  My Chart    Mode of Communication:  Video Conference via [facility]    Distant Location (Provider):  Off-site    As the provider I attest to compliance with applicable laws and regulations related to telemedicine.    DATA  Interactive Complexity: No  Crisis: No        Progress Since Last Session (Related to Symptoms / Goals / Homework):   Symptoms: Improving Patient reports recent increase in symptoms due to a recent psychiatry appointment.    Homework: Achieved / completed to satisfaction      Episode of Care Goals: Minimal progress - PREPARATION (Decided to change - considering how); Intervened by negotiating a change plan and determining options / strategies for behavior change, identifying triggers, exploring social supports, and working towards setting a date to begin behavior change     Current / Ongoing  "Stressors and Concerns:    Patient has completed a Diagnostic Assessment with Carolni Larkin, DEVAN, APRN, FMHNP-BC where they state they have received a diagnosis for Borderline Personality Disorder and Schizoaffective Disorder, depressive type. Patient reports medications have started to feel as though they have been working.  Patient also reports noticing an increase in ADHD related symptoms.  They report experiencing \"too many trains of thought at once\".  Patient also reports noticing an increase in anxiety related symptoms.  Lc reports experiencing a concern that they are going to die in regards to panic attacks.  Patient also reports feeling as though they have missed the time they are to sleep, which has affected her sleeping patterns.  Patient is concerned about current sleep quality and quantity.     Treatment Objective(s) Addressed in This Session:   Increase interest, engagement, and pleasure in doing things  Identify negative self-talk and behaviors: challenge core beliefs, myths, and actions  Sleep Quantity and Quality     Intervention:   Provider also discussed with the patient techniques and skills to utilize to challenge negative self talk and behaviors.      Motivational Interviewing    MI Intervention: Expressed Empathy/Understanding, Supported Autonomy, Collaboration, Evocation and Open-ended questions     Change Talk Expressed by the Patient: Desire to change Reasons to change    Provider Response to Change Talk: E - Evoked more info from patient about behavior change and A - Affirmed patient's thoughts, decisions, or attempts at behavior change      Assessments completed prior to visit:  The following assessments were completed by patient for this visit:  PHQ9:   PHQ-9 SCORE 2/3/2020 2/1/2023 2/6/2023 2/15/2023 3/9/2023 3/9/2023 3/26/2023   PHQ-9 Total Score MyChart - 21 (Severe depression) 14 (Moderate depression) 20 (Severe depression) - 21 (Severe depression) 12 (Moderate depression)   PHQ-9 " Total Score 14 21 14 20 21 21 12   PHQ-A Total Score - - - - - - -     GAD7:   MYNOR-7 SCORE 5/1/2019 2/1/2023 2/15/2023 3/9/2023 3/26/2023   Total Score - 18 (severe anxiety) 15 (severe anxiety) 18 (severe anxiety) 20 (severe anxiety)   Total Score 15 18 15 18 20     PROMIS 10-Global Health (only subscores and total score):   PROMIS-10 Scores Only 2/15/2023 3/9/2023   Global Mental Health Score 6 5   Global Physical Health Score 12 12   PROMIS TOTAL - SUBSCORES 18 17         ASSESSMENT: Current Emotional / Mental Status (status of significant symptoms):   Risk status (Self / Other harm or suicidal ideation)   Patient denies current fears or concerns for personal safety.   Patient denies current or recent suicidal ideation or behaviors.   Patient denies current or recent homicidal ideation or behaviors.   Patient denies current or recent self injurious behavior or ideation.   Patient denies other safety concerns.   Patient reports there has been no change in risk factors since their last session.     Patient reports there has been no change in protective factors since their last session.     Recommended that patient call 911 or go to the local ED should there be a change in any of these risk factors.     Appearance:   Appropriate    Eye Contact:   Good    Psychomotor Behavior: Normal    Attitude:   Cooperative  Interested Playful Pleasant   Orientation:   All   Speech    Rate / Production: Normal     Volume:  Normal    Mood:    Normal   Affect:    Appropriate    Thought Content:  Clear    Thought Form:  Coherent  Logical    Insight:    Good      Medication Review:   Changes to psychiatric medications, see updated Medication List in EPIC.      Medication Compliance:   Yes     Changes in Health Issues:   None reported     Chemical Use Review:   Substance Use: Chemical use reviewed, no active concerns identified      Tobacco Use: No current tobacco use.      Diagnosis:  1. Schizoaffective disorder, depressive type (H)     2. Borderline personality disorder (H)        Collateral Reports Completed:   Not Applicable    PLAN: (Patient Tasks / Therapist Tasks / Other)  Patient was encouraged to continue taking their psychiatric medications has prescribed. Patient was also ecnouarged to utilize techniques to challenge anxious thoughts.         PATRICIA IRAHETA                                                       This note has been reviewed and I agree with the plan of care. This note is co-signed by CHARLENE Melendrez, [facility], Supervisor, on: March 28, 2023

## 2023-03-26 ASSESSMENT — ANXIETY QUESTIONNAIRES
3. WORRYING TOO MUCH ABOUT DIFFERENT THINGS: NEARLY EVERY DAY
4. TROUBLE RELAXING: MORE THAN HALF THE DAYS
GAD7 TOTAL SCORE: 20
7. FEELING AFRAID AS IF SOMETHING AWFUL MIGHT HAPPEN: NEARLY EVERY DAY
GAD7 TOTAL SCORE: 20
GAD7 TOTAL SCORE: 20
5. BEING SO RESTLESS THAT IT IS HARD TO SIT STILL: NEARLY EVERY DAY
2. NOT BEING ABLE TO STOP OR CONTROL WORRYING: NEARLY EVERY DAY
IF YOU CHECKED OFF ANY PROBLEMS ON THIS QUESTIONNAIRE, HOW DIFFICULT HAVE THESE PROBLEMS MADE IT FOR YOU TO DO YOUR WORK, TAKE CARE OF THINGS AT HOME, OR GET ALONG WITH OTHER PEOPLE: VERY DIFFICULT
1. FEELING NERVOUS, ANXIOUS, OR ON EDGE: NEARLY EVERY DAY
6. BECOMING EASILY ANNOYED OR IRRITABLE: NEARLY EVERY DAY
7. FEELING AFRAID AS IF SOMETHING AWFUL MIGHT HAPPEN: NEARLY EVERY DAY
8. IF YOU CHECKED OFF ANY PROBLEMS, HOW DIFFICULT HAVE THESE MADE IT FOR YOU TO DO YOUR WORK, TAKE CARE OF THINGS AT HOME, OR GET ALONG WITH OTHER PEOPLE?: VERY DIFFICULT

## 2023-03-26 ASSESSMENT — PATIENT HEALTH QUESTIONNAIRE - PHQ9
SUM OF ALL RESPONSES TO PHQ QUESTIONS 1-9: 12
10. IF YOU CHECKED OFF ANY PROBLEMS, HOW DIFFICULT HAVE THESE PROBLEMS MADE IT FOR YOU TO DO YOUR WORK, TAKE CARE OF THINGS AT HOME, OR GET ALONG WITH OTHER PEOPLE: VERY DIFFICULT
SUM OF ALL RESPONSES TO PHQ QUESTIONS 1-9: 12

## 2023-03-28 ENCOUNTER — VIRTUAL VISIT (OUTPATIENT)
Dept: PSYCHOLOGY | Facility: CLINIC | Age: 23
End: 2023-03-28
Payer: COMMERCIAL

## 2023-03-28 ENCOUNTER — OFFICE VISIT (OUTPATIENT)
Dept: FAMILY MEDICINE | Facility: CLINIC | Age: 23
End: 2023-03-28
Payer: COMMERCIAL

## 2023-03-28 DIAGNOSIS — F43.10 PTSD (POST-TRAUMATIC STRESS DISORDER): ICD-10-CM

## 2023-03-28 DIAGNOSIS — F25.1 SCHIZOAFFECTIVE DISORDER, DEPRESSIVE TYPE (H): Primary | ICD-10-CM

## 2023-03-28 DIAGNOSIS — F25.1 SCHIZOAFFECTIVE DISORDER, DEPRESSIVE TYPE (H): ICD-10-CM

## 2023-03-28 DIAGNOSIS — F64.9 GENDER DYSPHORIA: Primary | ICD-10-CM

## 2023-03-28 DIAGNOSIS — F60.3 BORDERLINE PERSONALITY DISORDER (H): ICD-10-CM

## 2023-03-28 PROCEDURE — 99215 OFFICE O/P EST HI 40 MIN: CPT | Performed by: FAMILY MEDICINE

## 2023-03-28 PROCEDURE — 90834 PSYTX W PT 45 MINUTES: CPT | Mod: VID

## 2023-03-29 ENCOUNTER — OFFICE VISIT (OUTPATIENT)
Dept: FAMILY MEDICINE | Facility: CLINIC | Age: 23
End: 2023-03-29
Payer: COMMERCIAL

## 2023-03-29 VITALS
DIASTOLIC BLOOD PRESSURE: 70 MMHG | SYSTOLIC BLOOD PRESSURE: 120 MMHG | TEMPERATURE: 96.9 F | HEART RATE: 74 BPM | WEIGHT: 213.9 LBS | OXYGEN SATURATION: 97 % | RESPIRATION RATE: 10 BRPM | BODY MASS INDEX: 33.5 KG/M2

## 2023-03-29 DIAGNOSIS — Z79.899 HIGH RISK MEDICATION USE: ICD-10-CM

## 2023-03-29 DIAGNOSIS — M25.50 PAIN IN JOINT, MULTIPLE SITES: Primary | ICD-10-CM

## 2023-03-29 DIAGNOSIS — G44.209 TENSION HEADACHE: ICD-10-CM

## 2023-03-29 DIAGNOSIS — M94.0 COSTOCHONDRITIS: ICD-10-CM

## 2023-03-29 PROCEDURE — 99214 OFFICE O/P EST MOD 30 MIN: CPT | Performed by: FAMILY MEDICINE

## 2023-03-29 ASSESSMENT — PATIENT HEALTH QUESTIONNAIRE - PHQ9
10. IF YOU CHECKED OFF ANY PROBLEMS, HOW DIFFICULT HAVE THESE PROBLEMS MADE IT FOR YOU TO DO YOUR WORK, TAKE CARE OF THINGS AT HOME, OR GET ALONG WITH OTHER PEOPLE: VERY DIFFICULT
SUM OF ALL RESPONSES TO PHQ QUESTIONS 1-9: 12

## 2023-03-29 ASSESSMENT — ANXIETY QUESTIONNAIRES: GAD7 TOTAL SCORE: 20

## 2023-03-29 ASSESSMENT — PAIN SCALES - GENERAL: PAINLEVEL: SEVERE PAIN (6)

## 2023-03-29 NOTE — PROGRESS NOTES
Assessment & Plan     Shiloh was seen today for back pain f/u.    Diagnoses and all orders for this visit:    Pain in joint, multiple sites  Exact cause of the patient's multiple joint pains is unclear at this time.  Diagnostic work-up started today.  Imaging studies not indicated at this time.  Patient expressed pain in lower back, bilateral knees and shoulders.  -     Anti Nuclear Lamar IgG by IFA with Reflex; Future  -     PO antibody panel; Future  -     Uric acid; Future  -     Hepatitis B Surface Antibody; Future  -     Rheumatoid factor; Future  -     Cyclic Citrullinated Peptide Antibody IgG; Future  -     Cyclic Citrullinated Peptide Antibody IgG; Future  -     PO antibody panel; Future  -     Rheumatoid factor; Future  -     Anti Nuclear Lamar IgG by IFA with Reflex; Future  -     Hepatitis B Surface Antibody; Future  -     Uric acid; Future    Tension headache  Intermittent tension type headaches.  Advised pt to continue with over-the-counter medications for symptomatic relief.    Costochondritis  Sternal chest pain from chest binding.  Over-the-counter medications such as Tylenol and ibuprofen were recommended.    High risk medication use  -Patient was recently started on Abilify.  Patient is due for lipid panel check.  Mental health is currently improving.  Patient has regular follow-up with psychiatry and gender care clinic.    -     Lipid panel reflex to direct LDL Fasting; Future    Beau Roldan MD  [facility]    Subjective   cL is a 22 year old, presenting for the following health issues:  Back Pain F/U  No flowsheet data found.  History of Present Illness       Back Pain:  Lc Cheatham presents for follow up of back pain. Patient's back pain is a chronic problem.  Location of back pain:  Right lower back, left lower back, right middle of back and left middle of back  Description of back pain: dull ache and gnawing  Back pain spreads: nowhere    Since patient first  "noticed back pain, pain is: always present, but gets better and worse  Does back pain interfere with Lc Cheatham's job:  Yes      Mental Health Follow-up:  Patient presents to follow-up on Depression & Anxiety.Patient's depression since last visit has been:  Better  The patient is having other symptoms associated with depression.  Patient's anxiety since last visit has been:  Worse  The patient is having other symptoms associated with anxiety.  Any significant life events: grief or loss and health concerns  Patient is feeling anxious or having panic attacks.  Patient has no concerns about alcohol or drug use.    Headaches:   Since the patient's last clinic visit, headaches are: worsened  The patient is getting headaches:  Between 3 and 5 a week  Lc Cheatham is not able to do normal daily activities when Lc Cheatham has a migraine.  The patient is taking the following rescue/relief medications:  Ibuprofen (Advil, Motrin) and Tylenol   Patient states \"The relief is inconsistent\" from the rescue/relief medications.   The patient is taking the following medications to prevent migraines:  No medications to prevent migraines  In the past 4 weeks, the patient has gone to an Urgent Care or Emergency Room 0 times times due to headaches.    Reason for visit:  Follow-up    Lc Cheatham eats 0-1 servings of fruits and vegetables daily.Lc Cheatham consumes 2 sweetened beverage(s) daily.Lc Cheatham exercises with enough effort to increase Lc Garcias heart rate 60 or more minutes per day.  Lc Cheatham exercises with enough effort to increase Lc Cheatham's heart rate 5 days per week.   Lc Cheatham is taking medications regularly.    Today's PHQ-9         PHQ-9 Total Score: 12    PHQ-9 Q9 Thoughts of better off dead/self-harm past 2 weeks :   Not at all    How difficult have these problems made it for you to do your work, take care of things at home, or get along with other people: Very difficult  Today's MYNOR-7 " Score: 20     Chronic pain in back, hips, knees, shoulders (occasionally). Pain for many years.  Knee pain started at the age of 12. Episodes of pain daily or every other day. Standing for longer than 45 minutes triggers the pain.   Climbing a flight of stairs causes knee pain.   Unsure about family hx.     Headaches: increase in frequency. Occasional migrainous headaches. Patient gets a headache one every 2-3 days. Lasts for 3-4 hours. Resolves with time. Patient has a lot of stress which could've triggered it.     Chest pain secondary to chest binding.  Patient denies any palpitations, shortness of breath or radiation of the pain.    Review of Systems   Constitutional, HEENT, cardiovascular, pulmonary, gi and gu systems are negative, except as otherwise noted.      Objective    /70   Pulse 74   Temp 96.9  F (36.1  C) (Temporal)   Resp 10   Wt 97 kg (213 lb 14.4 oz)   LMP 03/28/2023 (Exact Date)   SpO2 97%   BMI 33.50 kg/m    Body mass index is 33.5 kg/m .  Physical Exam   GENERAL: healthy, alert and no distress  NECK: no adenopathy, no asymmetry, masses, or scars and thyroid normal to palpation  RESP: lungs clear to auscultation - no rales, rhonchi or wheezes  CV: regular rate and rhythm, normal S1 S2, no S3 or S4, no murmur, click or rub, no peripheral edema and peripheral pulses strong  MS: no gross musculoskeletal defects noted, no edema  NEURO: Normal strength and tone, mentation intact and speech normal    No results found for any visits on 03/29/23.

## 2023-03-30 ENCOUNTER — LAB (OUTPATIENT)
Dept: LAB | Facility: CLINIC | Age: 23
End: 2023-03-30
Payer: COMMERCIAL

## 2023-03-30 DIAGNOSIS — M25.50 PAIN IN JOINT, MULTIPLE SITES: ICD-10-CM

## 2023-03-30 DIAGNOSIS — Z79.899 HIGH RISK MEDICATION USE: ICD-10-CM

## 2023-03-30 LAB
CHOLEST SERPL-MCNC: 143 MG/DL
HDLC SERPL-MCNC: 38 MG/DL
LDLC SERPL CALC-MCNC: 90 MG/DL
NONHDLC SERPL-MCNC: 105 MG/DL
TRIGL SERPL-MCNC: 73 MG/DL
URATE SERPL-MCNC: 6.5 MG/DL (ref 2.4–7)

## 2023-03-30 PROCEDURE — 84550 ASSAY OF BLOOD/URIC ACID: CPT

## 2023-03-30 PROCEDURE — 80061 LIPID PANEL: CPT

## 2023-03-30 PROCEDURE — 36415 COLL VENOUS BLD VENIPUNCTURE: CPT

## 2023-03-30 PROCEDURE — 86200 CCP ANTIBODY: CPT

## 2023-03-30 PROCEDURE — 86235 NUCLEAR ANTIGEN ANTIBODY: CPT | Mod: 59

## 2023-03-30 PROCEDURE — 86038 ANTINUCLEAR ANTIBODIES: CPT

## 2023-03-30 PROCEDURE — 86431 RHEUMATOID FACTOR QUANT: CPT

## 2023-03-30 PROCEDURE — 86706 HEP B SURFACE ANTIBODY: CPT

## 2023-03-31 LAB
ANA SER QL IF: NEGATIVE
HBV SURFACE AB SERPL IA-ACNC: 10.42 M[IU]/ML
HBV SURFACE AB SERPL IA-ACNC: NORMAL M[IU]/ML
RHEUMATOID FACT SER NEPH-ACNC: <7 IU/ML

## 2023-03-31 NOTE — PROGRESS NOTES
[facility]                                     Progress Note    Patient Name: Shiloh Cheatham  Date: 3/28/23         Service Type: Individual      Session Start Time: 8:01 AM   Session End Time: 8:53 AM     Session Length: 52 Minutes    Session #: 3    Attendees: Client attended alone    Service Modality:  Video Visit:      Provider verified identity through the following two step process.  Patient provided:  Patient  and Patient address    Telemedicine Visit: The patient's condition can be safely assessed and treated via synchronous audio and visual telemedicine encounter.      Reason for Telemedicine Visit: Services only offered telehealth    Originating Site (Patient Location): Patient's home    Distant Site (Provider Location): Provider Remote Setting- Home Office    Consent:  The patient/guardian has verbally consented to: the potential risks and benefits of telemedicine (video visit) versus in person care; bill my insurance or make self-payment for services provided; and responsibility for payment of non-covered services.     Patient would like the video invitation sent by:  My Chart    Mode of Communication:  [facility]    Distant Location (Provider):  Off-site    As the provider I attest to compliance with applicable laws and regulations related to telemedicine.    DATA  Interactive Complexity: No  Crisis: No        Progress Since Last Session (Related to Symptoms / Goals / Homework):   Symptoms: No change Patient continues to experience symptoms of anxiety    Homework: Partially completed      Episode of Care Goals: Minimal progress - PREPARATION (Decided to change - considering how); Intervened by negotiating a change plan and determining options / strategies for behavior change, identifying triggers, exploring social supports, and working towards setting a date to begin behavior change     Current / Ongoing Stressors and Concerns:   Patient reports that their  girlfriend's sister-in-law passed away and they have been trying to support them since.  Patient also reports that he had a recent breakdown due to the increasing anxiety.  Patient also reports feeling excited their medications are working.  They continues to report experiencing anxiety attacks where they are catastrophizing, their heart is racing, and the experience of feeling of impending doom.  Patient reports wanting to learn practical skills helpful in managing their anxiety.     Treatment Objective(s) Addressed in This Session:   identify their fears / thoughts that contribute to feeling anxious  Treatment Planning     Intervention:   Motivational Interviewing    MI Intervention: Expressed Empathy/Understanding, Supported Autonomy, Collaboration, Evocation, Permission to raise concern or advise and Open-ended questions     Change Talk Expressed by the Patient: Desire to change Reasons to change    Provider Response to Change Talk: E - Evoked more info from patient about behavior change, A - Affirmed patient's thoughts, decisions, or attempts at behavior change and R - Reflected patient's change talk      Assessments completed prior to visit:  The following assessments were completed by patient for this visit:  PHQ9:       11/4/2019    10:11 AM 2/3/2020    10:54 AM 2/1/2023    12:39 PM 2/6/2023    11:11 PM 2/15/2023    11:10 AM 3/9/2023    12:44 PM 3/26/2023     1:39 PM   PHQ-9 SCORE   PHQ-9 Total Score MyChart   21 (Severe depression) 14 (Moderate depression) 20 (Severe depression) 21 (Severe depression) 12 (Moderate depression)   PHQ-9 Total Score 15 14 21 14 20 21    21 12     GAD7:       5/1/2019     2:46 PM 2/1/2023    12:40 PM 2/15/2023    11:32 AM 3/9/2023    12:45 PM 3/26/2023     1:39 PM   MYNOR-7 SCORE   Total Score  18 (severe anxiety) 15 (severe anxiety) 18 (severe anxiety) 20 (severe anxiety)   Total Score 15 18 15 18 20     PROMIS 10-Global Health (only subscores and total score):       2/15/2023     11:36 AM 3/9/2023    12:46 PM   PROMIS-10 Scores Only   Global Mental Health Score 6 5   Global Physical Health Score 12 12   PROMIS TOTAL - SUBSCORES 18 17         ASSESSMENT: Current Emotional / Mental Status (status of significant symptoms):   Risk status (Self / Other harm or suicidal ideation)   Patient denies current fears or concerns for personal safety.   Patient denies current or recent suicidal ideation or behaviors.   Patient denies current or recent homicidal ideation or behaviors.   Patient denies current or recent self injurious behavior or ideation.   Patient denies other safety concerns.   Patient reports there has been no change in risk factors since their last session.     Patient reports there has been no change in protective factors since their last session.     Recommended that patient call 911 or go to the local ED should there be a change in any of these risk factors.     Appearance:   Appropriate    Eye Contact:   Good    Psychomotor Behavior: Normal    Attitude:   Cooperative    Orientation:   All   Speech    Rate / Production: Normal     Volume:  Normal    Mood:    Normal   Affect:    Appropriate    Thought Content:  Clear    Thought Form:  Coherent  Logical    Insight:    Good      Medication Review:   No changes to current psychiatric medication(s)     Medication Compliance:   Yes     Changes in Health Issues:   None reported     Chemical Use Review:   Substance Use: Chemical use reviewed, no active concerns identified      Tobacco Use: No current tobacco use.      Diagnosis:  1. Schizoaffective disorder, depressive type (H)    2. Borderline personality disorder (H)        Collateral Reports Completed:   Not Applicable    PLAN: (Patient Tasks / Therapist Tasks / Other)  Patient was encouraged to think of their goals for therapy for the next session.         PATRICIA IRAHETA    This note has been reviewed and I agree with the plan of care. This note is co-signed by Negin Jimenez  LEATHA CHANG, Supervisor, on: April 5, 2023                                                ______________________________________________________________________    Individual Treatment Plan    Patient's Name: Shiloh Cheatham  YOB: 2000    Date of Creation: 03/28/23  Date Treatment Plan Last Reviewed/Revised: 03/28/23    DSM5 Diagnoses: 295.70  (F25.1) Schizoaffective Disorder Depressive Type or 301.83 (F60.3) Borderline Personality Disorder  Psychosocial / Contextual Factors: Patient reports a recent increase in symptoms of anxiety.  PROMIS (reviewed every 90 days): 17    Referral / Collaboration:  Referral to another professional/service is not indicated at this time..    Anticipated number of session for this episode of care: 9-12 sessions  Anticipation frequency of session: Weekly  Anticipated Duration of each session: 38-52 minutes  Treatment plan will be reviewed in 90 days or when goals have been changed.       MeasurableTreatment Goal(s) related to diagnosis / functional impairment(s)  Goal 1: Patient will developed and demonstrate coping skills to deal with mood swings and any impulsive behavior.    Objective #A  Patient will Discussed previous or current posttraumatic stress.  Status: New - Date: 03/28/23     Intervention(s)  Therapist will Work with the client remembering the facts of previous trauma, increasing insights to its effects, and reducing self only any self blame..    Objective #B  Patient will Learn and apply any problem solving skills to complex in daily life..  Status: New - Date: 03/28/23     Intervention(s)  Therapist will teach Problem-solving skills supplied to daily life situations that may cause conflict..      Goal 2: Patient will alleviate depressive symptoms and return to previous effective functioning. As evidenced by a PHQ-9 score of 9 or less.  Objective #A (Patient Action)    Status: New - Date: 03/28/23     Patient will Decrease frequency and intensity of feeling  down, depressed, hopeless.    Intervention(s)  Therapist will assign homework to aid in decreasing the frequency and intensity of feeling down, depressed, and hopeless..    Objective #B  Patient will Identify negative self-talk and behaviors: challenge core beliefs, myths, and actions.    Status: New - Date: 03/28/23     Intervention(s)  Therapist will teach the patient skills to identify negative self-talk and behaviors: challenge core beliefs, myths, and actions..    Objective #C  Patient will Increase interest, engagement, and pleasure in doing things.  Status: New - Date: 03/28/23     Intervention(s)  Therapist will assign homework to aid in increasing the patient's interest, enagement, and pleasure in doing things.          Patient has not reviewed nor agreed to the above plan.      PATRICIA IRAHETA  March 28, 2023    This note has been reviewed and I agree with the plan of care. This note is co-signed by CHARLENE Melendrez, [facility], Supervisor, on: April 5, 2023

## 2023-03-31 NOTE — RESULT ENCOUNTER NOTE
Dear Lc,   Your testing for immunity against hepatitis B was borderline.  Please schedule a nurse only visit for a single injection of hepatitis B vaccine.    Best Regards  Beau Roldan MD

## 2023-04-04 ENCOUNTER — VIRTUAL VISIT (OUTPATIENT)
Dept: PSYCHOLOGY | Facility: CLINIC | Age: 23
End: 2023-04-04
Payer: COMMERCIAL

## 2023-04-04 DIAGNOSIS — F25.1 SCHIZOAFFECTIVE DISORDER, DEPRESSIVE TYPE (H): Primary | ICD-10-CM

## 2023-04-04 DIAGNOSIS — F60.3 BORDERLINE PERSONALITY DISORDER (H): ICD-10-CM

## 2023-04-04 LAB
CCP AB SER IA-ACNC: 1.9 U/ML
ENA SM IGG SER IA-ACNC: <0.7 U/ML
ENA SM IGG SER IA-ACNC: NEGATIVE
ENA SS-A AB SER IA-ACNC: <0.5 U/ML
ENA SS-A AB SER IA-ACNC: NEGATIVE
ENA SS-B IGG SER IA-ACNC: <0.6 U/ML
ENA SS-B IGG SER IA-ACNC: NEGATIVE
U1 SNRNP IGG SER IA-ACNC: 2 U/ML
U1 SNRNP IGG SER IA-ACNC: NEGATIVE

## 2023-04-04 PROCEDURE — 90834 PSYTX W PT 45 MINUTES: CPT | Mod: VID

## 2023-04-07 PROBLEM — F64.9 GENDER DYSPHORIA: Status: ACTIVE | Noted: 2023-04-07

## 2023-04-07 PROBLEM — F43.10 PTSD (POST-TRAUMATIC STRESS DISORDER): Status: ACTIVE | Noted: 2023-04-07

## 2023-04-07 NOTE — PROGRESS NOTES
[facility]                                     Progress Note    Patient Name: Shiloh Cheatham  Date: 23         Service Type: Individual      Session Start Time: 8:01 AM   Session End Time: 8:53 AM     Session Length: 52 Minutes    Session #: 3    Attendees: Client attended alone    Service Modality:  Video Visit:      Provider verified identity through the following two step process.  Patient provided:  Patient  and Patient address    Telemedicine Visit: The patient's condition can be safely assessed and treated via synchronous audio and visual telemedicine encounter.      Reason for Telemedicine Visit: Services only offered telehealth    Originating Site (Patient Location): Patient's home    Distant Site (Provider Location): Provider Remote Setting- Home Office    Consent:  The patient/guardian has verbally consented to: the potential risks and benefits of telemedicine (video visit) versus in person care; bill my insurance or make self-payment for services provided; and responsibility for payment of non-covered services.     Patient would like the video invitation sent by:  My Chart    Mode of Communication:  [facility]    Distant Location (Provider):  Off-site    As the provider I attest to compliance with applicable laws and regulations related to telemedicine.    DATA  Interactive Complexity: No  Crisis: No        Progress Since Last Session (Related to Symptoms / Goals / Homework):   Symptoms: No change Patient continues to experience symptoms of anxiety    Homework: Partially completed      Episode of Care Goals: Minimal progress - PREPARATION (Decided to change - considering how); Intervened by negotiating a change plan and determining options / strategies for behavior change, identifying triggers, exploring social supports, and working towards setting a date to begin behavior change     Current / Ongoing Stressors and Concerns:   Patient reports that their  girlfriend's sister-in-law passed away and they have been trying to support them since.  Patient reports it has been hard for their girlfriend and themselves since.  Patient also reports sleeping on the couch in the past week due to issues with their bed.  Patient reports experiencing back pain due to sleeping on the couch.  They state they have received a new bed to hopefully their back pain improves.  Patient also reports that anxiety has been very bad in the last week.  They state they messaged their psychiatrist to decrease the dosage of their antipsychotics.  Patient reports concerns for the recent changes in regards to their medication.     Treatment Objective(s) Addressed in This Session:   identify their fears / thoughts that contribute to feeling anxious  Negative self talk and core beliefs  Grief  Grounding techniques for anxiety     Intervention:   Motivational Interviewing    MI Intervention: Expressed Empathy/Understanding, Supported Autonomy, Collaboration, Evocation, Permission to raise concern or advise and Open-ended questions     Change Talk Expressed by the Patient: Desire to change Reasons to change    Provider Response to Change Talk: E - Evoked more info from patient about behavior change, A - Affirmed patient's thoughts, decisions, or attempts at behavior change and R - Reflected patient's change talk      Assessments completed prior to visit:  The following assessments were completed by patient for this visit:  PHQ9:       11/4/2019    10:11 AM 2/3/2020    10:54 AM 2/1/2023    12:39 PM 2/6/2023    11:11 PM 2/15/2023    11:10 AM 3/9/2023    12:44 PM 3/26/2023     1:39 PM   PHQ-9 SCORE   PHQ-9 Total Score MyChart   21 (Severe depression) 14 (Moderate depression) 20 (Severe depression) 21 (Severe depression) 12 (Moderate depression)   PHQ-9 Total Score 15 14 21 14 20 21    21 12     GAD7:       5/1/2019     2:46 PM 2/1/2023    12:40 PM 2/15/2023    11:32 AM 3/9/2023    12:45 PM 3/26/2023      1:39 PM   MYNOR-7 SCORE   Total Score  18 (severe anxiety) 15 (severe anxiety) 18 (severe anxiety) 20 (severe anxiety)   Total Score 15 18 15 18 20     PROMIS 10-Global Health (only subscores and total score):       2/15/2023    11:36 AM 3/9/2023    12:46 PM   PROMIS-10 Scores Only   Global Mental Health Score 6 5   Global Physical Health Score 12 12   PROMIS TOTAL - SUBSCORES 18 17         ASSESSMENT: Current Emotional / Mental Status (status of significant symptoms):   Risk status (Self / Other harm or suicidal ideation)   Patient denies current fears or concerns for personal safety.   Patient denies current or recent suicidal ideation or behaviors.   Patient denies current or recent homicidal ideation or behaviors.   Patient denies current or recent self injurious behavior or ideation.   Patient denies other safety concerns.   Patient reports there has been no change in risk factors since their last session.     Patient reports there has been no change in protective factors since their last session.     Recommended that patient call 911 or go to the local ED should there be a change in any of these risk factors.     Appearance:   Appropriate    Eye Contact:   Good    Psychomotor Behavior: Normal    Attitude:   Cooperative    Orientation:   All   Speech    Rate / Production: Normal     Volume:  Normal    Mood:    Normal   Affect:    Appropriate    Thought Content:  Clear    Thought Form:  Coherent  Logical    Insight:    Good      Medication Review:   No changes to current psychiatric medication(s)     Medication Compliance:   Yes     Changes in Health Issues:   None reported     Chemical Use Review:   Substance Use: Chemical use reviewed, no active concerns identified      Tobacco Use: No current tobacco use.      Diagnosis:  1. Schizoaffective disorder, depressive type (H)    2. Borderline personality disorder (H)        Collateral Reports Completed:   Not Applicable    PLAN: (Patient Tasks / Therapist Tasks /  Other)  Patient was encouraged to utilize their learned grounding techniques in order to her problems also feeling anxious.        JAC QUINTANILLA [facility]    This note has been reviewed and I agree with the plan of care. This note is co-signed by CHARLENE Melendrez, [facility], Supervisor, on: April 7, 2023                                            ______________________________________________________________________    Individual Treatment Plan    Patient's Name: Shiloh Cheatham  YOB: 2000    Date of Creation: 03/28/23  Date Treatment Plan Last Reviewed/Revised: 03/28/23    DSM5 Diagnoses: 295.70  (F25.1) Schizoaffective Disorder Depressive Type or 301.83 (F60.3) Borderline Personality Disorder  Psychosocial / Contextual Factors: Patient reports a recent increase in symptoms of anxiety.  PROMIS (reviewed every 90 days): 17    Referral / Collaboration:  Referral to another professional/service is not indicated at this time..    Anticipated number of session for this episode of care: 9-12 sessions  Anticipation frequency of session: Weekly  Anticipated Duration of each session: 38-52 minutes  Treatment plan will be reviewed in 90 days or when goals have been changed.       MeasurableTreatment Goal(s) related to diagnosis / functional impairment(s)  Goal 1: Patient will developed and demonstrate coping skills to deal with mood swings and any impulsive behavior.    Objective #A  Patient will Discussed previous or current posttraumatic stress.  Status: New - Date: 03/28/23     Intervention(s)  Therapist will Work with the client remembering the facts of previous trauma, increasing insights to its effects, and reducing self only any self blame..    Objective #B  Patient will Learn and apply any problem solving skills to complex in daily life..  Status: New - Date: 03/28/23     Intervention(s)  Therapist will teach Problem-solving skills supplied to daily life situations that may cause conflict..      Goal 2:  Patient will alleviate depressive symptoms and return to previous effective functioning. As evidenced by a PHQ-9 score of 9 or less.  Objective #A (Patient Action)    Status: New - Date: 03/28/23     Patient will Decrease frequency and intensity of feeling down, depressed, hopeless.    Intervention(s)  Therapist will assign homework to aid in decreasing the frequency and intensity of feeling down, depressed, and hopeless..    Objective #B  Patient will Identify negative self-talk and behaviors: challenge core beliefs, myths, and actions.    Status: New - Date: 03/28/23     Intervention(s)  Therapist will teach the patient skills to identify negative self-talk and behaviors: challenge core beliefs, myths, and actions..    Objective #C  Patient will Increase interest, engagement, and pleasure in doing things.  Status: New - Date: 03/28/23     Intervention(s)  Therapist will assign homework to aid in increasing the patient's interest, enagement, and pleasure in doing things.          Patient has not reviewed nor agreed to the above plan.      PATRICIA IRAHETA  March 28, 2023    This note has been reviewed and I agree with the plan of care. This note is co-signed by CHARLENE Melendrez, [facility], Supervisor, on: April 5, 2023

## 2023-04-11 VITALS
SYSTOLIC BLOOD PRESSURE: 116 MMHG | HEIGHT: 68 IN | BODY MASS INDEX: 32.4 KG/M2 | HEART RATE: 82 BPM | WEIGHT: 213.8 LBS | DIASTOLIC BLOOD PRESSURE: 77 MMHG

## 2023-04-11 ASSESSMENT — ANXIETY QUESTIONNAIRES
1. FEELING NERVOUS, ANXIOUS, OR ON EDGE: NEARLY EVERY DAY
5. BEING SO RESTLESS THAT IT IS HARD TO SIT STILL: MORE THAN HALF THE DAYS
7. FEELING AFRAID AS IF SOMETHING AWFUL MIGHT HAPPEN: NEARLY EVERY DAY
6. BECOMING EASILY ANNOYED OR IRRITABLE: MORE THAN HALF THE DAYS
GAD7 TOTAL SCORE: 18
3. WORRYING TOO MUCH ABOUT DIFFERENT THINGS: NEARLY EVERY DAY
GAD7 TOTAL SCORE: 18
2. NOT BEING ABLE TO STOP OR CONTROL WORRYING: NEARLY EVERY DAY

## 2023-04-11 ASSESSMENT — PATIENT HEALTH QUESTIONNAIRE - PHQ9
SUM OF ALL RESPONSES TO PHQ QUESTIONS 1-9: 18
5. POOR APPETITE OR OVEREATING: MORE THAN HALF THE DAYS

## 2023-04-14 ENCOUNTER — VIRTUAL VISIT (OUTPATIENT)
Dept: PSYCHOLOGY | Facility: CLINIC | Age: 23
End: 2023-04-14
Payer: COMMERCIAL

## 2023-04-14 DIAGNOSIS — F25.1 SCHIZOAFFECTIVE DISORDER, DEPRESSIVE TYPE (H): Primary | ICD-10-CM

## 2023-04-14 DIAGNOSIS — F60.3 BORDERLINE PERSONALITY DISORDER (H): ICD-10-CM

## 2023-04-14 PROCEDURE — 90834 PSYTX W PT 45 MINUTES: CPT | Mod: VID

## 2023-04-17 NOTE — PROGRESS NOTES
[facility]                                     Progress Note    Patient Name: Shiloh Cheatham  Date: 23         Service Type: Individual      Session Start Time: 12:30 PM   Session End Time: 1:22 PM     Session Length: 52 Minutes    Session #: 4    Attendees: Client attended alone    Service Modality:  Video Visit:      Provider verified identity through the following two step process.  Patient provided:  Patient  and Patient address    Telemedicine Visit: The patient's condition can be safely assessed and treated via synchronous audio and visual telemedicine encounter.      Reason for Telemedicine Visit: Services only offered telehealth    Originating Site (Patient Location): Patient's home    Distant Site (Provider Location): Provider Remote Setting- Home Office    Consent:  The patient/guardian has verbally consented to: the potential risks and benefits of telemedicine (video visit) versus in person care; bill my insurance or make self-payment for services provided; and responsibility for payment of non-covered services.     Patient would like the video invitation sent by:  My Chart    Mode of Communication:  Video Conference via Amwell    Distant Location (Provider):  Off-site    As the provider I attest to compliance with applicable laws and regulations related to telemedicine.    DATA  Interactive Complexity: No  Crisis: No        Progress Since Last Session (Related to Symptoms / Goals / Homework):   Symptoms: No change Patient continues to experience symptoms of anxiety    Homework: Partially completed      Episode of Care Goals: Minimal progress - PREPARATION (Decided to change - considering how); Intervened by negotiating a change plan and determining options / strategies for behavior change, identifying triggers, exploring social supports, and working towards setting a date to begin behavior change     Current / Ongoing Stressors and Concerns:   Patient reports that their  girlfriend's sister-in-law passed away and they have been trying to support them since.  Patient reports going to the  of their girlfriends sister-in-law and finding it difficult.  Patient reports that their anxiety has increased due to stress at work and going to the .  Patient reports experiencing 3-4 panic attacks in the last week.  They report the panic attacks were more intense and long-lasting than they have been before.  Patient also reports noticing heart palpitations and experiencing an impending sense of doom.  Patient also reports difficulty figuring out how to best support their girlfriend during this time.     Treatment Objective(s) Addressed in This Session:   identify their fears / thoughts that contribute to feeling anxious  Negative self talk and core beliefs  Grief  Grounding techniques for anxiety     Intervention:   Motivational Interviewing    MI Intervention: Expressed Empathy/Understanding, Supported Autonomy, Collaboration, Evocation, Permission to raise concern or advise and Open-ended questions     Change Talk Expressed by the Patient: Desire to change Reasons to change    Provider Response to Change Talk: E - Evoked more info from patient about behavior change, A - Affirmed patient's thoughts, decisions, or attempts at behavior change and R - Reflected patient's change talk      Assessments completed prior to visit:  The following assessments were completed by patient for this visit:  PHQ9:       2/3/2020    10:54 AM 2023    12:39 PM 2023    11:11 PM 2/15/2023    11:10 AM 3/9/2023    12:44 PM 3/26/2023     1:39 PM 2023     8:46 AM   PHQ-9 SCORE   PHQ-9 Total Score MyChart  21 (Severe depression) 14 (Moderate depression) 20 (Severe depression) 21 (Severe depression) 12 (Moderate depression)    PHQ-9 Total Score 14 21 14 20 21    21 12 18     GAD7:       2019     2:46 PM 2023    12:40 PM 2/15/2023    11:32 AM 3/9/2023    12:45 PM 3/26/2023     1:39 PM  4/11/2023     8:46 AM   MYNOR-7 SCORE   Total Score  18 (severe anxiety) 15 (severe anxiety) 18 (severe anxiety) 20 (severe anxiety)    Total Score 15 18 15 18 20 18     PROMIS 10-Global Health (only subscores and total score):       2/15/2023    11:36 AM 3/9/2023    12:46 PM   PROMIS-10 Scores Only   Global Mental Health Score 6 5   Global Physical Health Score 12 12   PROMIS TOTAL - SUBSCORES 18 17         ASSESSMENT: Current Emotional / Mental Status (status of significant symptoms):   Risk status (Self / Other harm or suicidal ideation)   Patient denies current fears or concerns for personal safety.   Patient denies current or recent suicidal ideation or behaviors.   Patient denies current or recent homicidal ideation or behaviors.   Patient denies current or recent self injurious behavior or ideation.   Patient denies other safety concerns.   Patient reports there has been no change in risk factors since their last session.     Patient reports there has been no change in protective factors since their last session.     Recommended that patient call 911 or go to the local ED should there be a change in any of these risk factors.     Appearance:   Appropriate    Eye Contact:   Good    Psychomotor Behavior: Normal    Attitude:   Cooperative  Pleasant   Orientation:   All   Speech    Rate / Production: Normal     Volume:  Normal    Mood:    Normal   Affect:    Appropriate    Thought Content:  Clear    Thought Form:  Coherent  Logical    Insight:    Good      Medication Review:   No changes to current psychiatric medication(s)     Medication Compliance:   Yes     Changes in Health Issues:   None reported     Chemical Use Review:   Substance Use: Chemical use reviewed, no active concerns identified      Tobacco Use: No current tobacco use.      Diagnosis:  1. Schizoaffective disorder, depressive type (H)    2. Borderline personality disorder (H)        Collateral Reports Completed:   Not Applicable    PLAN: (Patient  Tasks / Therapist Tasks / Other)  Patient was encouraged to utilize their learned grounding techniques in order to her problems also feeling anxious.  Patient was also encouraged to work on techniques to challenge negative core believes and reframe them positively.        PATRICIA IRAHETA    This note has been reviewed and I agree with the plan of care. This note is co-signed by CHARLENE Melendrez, [facility], Supervisor, on: April 17, 2023                                          ______________________________________________________________________    Individual Treatment Plan    Patient's Name: Shiloh Cheatham  YOB: 2000    Date of Creation: 03/28/23  Date Treatment Plan Last Reviewed/Revised: 03/28/23    DSM5 Diagnoses: 295.70  (F25.1) Schizoaffective Disorder Depressive Type or 301.83 (F60.3) Borderline Personality Disorder  Psychosocial / Contextual Factors: Patient reports a recent increase in symptoms of anxiety.  PROMIS (reviewed every 90 days): 17    Referral / Collaboration:  Referral to another professional/service is not indicated at this time..    Anticipated number of session for this episode of care: 9-12 sessions  Anticipation frequency of session: Weekly  Anticipated Duration of each session: 38-52 minutes  Treatment plan will be reviewed in 90 days or when goals have been changed.       MeasurableTreatment Goal(s) related to diagnosis / functional impairment(s)  Goal 1: Patient will developed and demonstrate coping skills to deal with mood swings and any impulsive behavior.    Objective #A  Patient will Discussed previous or current posttraumatic stress.  Status: New - Date: 03/28/23     Intervention(s)  Therapist will Work with the client remembering the facts of previous trauma, increasing insights to its effects, and reducing self only any self blame..    Objective #B  Patient will Learn and apply any problem solving skills to complex in daily life..  Status: New - Date:  03/28/23     Intervention(s)  Therapist will teach Problem-solving skills supplied to daily life situations that may cause conflict..      Goal 2: Patient will alleviate depressive symptoms and return to previous effective functioning. As evidenced by a PHQ-9 score of 9 or less.  Objective #A (Patient Action)    Status: New - Date: 03/28/23     Patient will Decrease frequency and intensity of feeling down, depressed, hopeless.    Intervention(s)  Therapist will assign homework to aid in decreasing the frequency and intensity of feeling down, depressed, and hopeless..    Objective #B  Patient will Identify negative self-talk and behaviors: challenge core beliefs, myths, and actions.    Status: New - Date: 03/28/23     Intervention(s)  Therapist will teach the patient skills to identify negative self-talk and behaviors: challenge core beliefs, myths, and actions..    Objective #C  Patient will Increase interest, engagement, and pleasure in doing things.  Status: New - Date: 03/28/23     Intervention(s)  Therapist will assign homework to aid in increasing the patient's interest, enagement, and pleasure in doing things.          Patient has not reviewed nor agreed to the above plan.      PATRICIA IRAHETA  March 28, 2023    This note has been reviewed and I agree with the plan of care. This note is co-signed by CHARLENE Melendrez, [facility], Supervisor, on: April 5, 2023

## 2023-04-21 ENCOUNTER — HOSPITAL ENCOUNTER (EMERGENCY)
Facility: CLINIC | Age: 23
Discharge: HOME | End: 2023-04-21
Attending: EMERGENCY MEDICINE | Admitting: EMERGENCY MEDICINE
Payer: COMMERCIAL

## 2023-04-21 ENCOUNTER — APPOINTMENT (OUTPATIENT)
Dept: GENERAL RADIOLOGY | Facility: CLINIC | Age: 23
End: 2023-04-21
Attending: EMERGENCY MEDICINE
Payer: COMMERCIAL

## 2023-04-21 VITALS
RESPIRATION RATE: 16 BRPM | DIASTOLIC BLOOD PRESSURE: 82 MMHG | SYSTOLIC BLOOD PRESSURE: 128 MMHG | OXYGEN SATURATION: 100 % | HEART RATE: 111 BPM | TEMPERATURE: 97.9 F

## 2023-04-21 DIAGNOSIS — R07.9 CHEST PAIN, UNSPECIFIED TYPE: ICD-10-CM

## 2023-04-21 LAB
ALBUMIN SERPL BCG-MCNC: 4.7 G/DL (ref 3.5–5.2)
ALP SERPL-CCNC: 82 U/L (ref 35–129)
ALT SERPL W P-5'-P-CCNC: 14 U/L (ref 10–50)
ANION GAP SERPL CALCULATED.3IONS-SCNC: 13 MMOL/L (ref 7–15)
AST SERPL W P-5'-P-CCNC: 17 U/L (ref 10–50)
ATRIAL RATE - MUSE: 101 BPM
BASOPHILS # BLD AUTO: 0 10E3/UL (ref 0–0.2)
BASOPHILS NFR BLD AUTO: 1 %
BILIRUB SERPL-MCNC: 0.4 MG/DL
BUN SERPL-MCNC: 10 MG/DL (ref 6–20)
CALCIUM SERPL-MCNC: 9.5 MG/DL (ref 8.6–10)
CHLORIDE SERPL-SCNC: 106 MMOL/L (ref 98–107)
CREAT SERPL-MCNC: 0.85 MG/DL (ref 0.51–1.17)
D DIMER PPP FEU-MCNC: 0.33 UG/ML FEU (ref 0–0.5)
DEPRECATED HCO3 PLAS-SCNC: 23 MMOL/L (ref 22–29)
DIASTOLIC BLOOD PRESSURE - MUSE: NORMAL MMHG
EOSINOPHIL # BLD AUTO: 0.3 10E3/UL (ref 0–0.7)
EOSINOPHIL NFR BLD AUTO: 3 %
ERYTHROCYTE [DISTWIDTH] IN BLOOD BY AUTOMATED COUNT: 12.7 % (ref 10–15)
GFR SERPL CREATININE-BSD FRML MDRD: >90 ML/MIN/1.73M2
GLUCOSE SERPL-MCNC: 125 MG/DL (ref 70–99)
HCG SERPL QL: NEGATIVE
HCT VFR BLD AUTO: 41 % (ref 35–53)
HGB BLD-MCNC: 13.1 G/DL (ref 11.7–17.7)
IMM GRANULOCYTES # BLD: 0 10E3/UL
IMM GRANULOCYTES NFR BLD: 0 %
INTERPRETATION ECG - MUSE: NORMAL
LYMPHOCYTES # BLD AUTO: 2.1 10E3/UL (ref 0.8–5.3)
LYMPHOCYTES NFR BLD AUTO: 24 %
MCH RBC QN AUTO: 28.4 PG (ref 26.5–33)
MCHC RBC AUTO-ENTMCNC: 32 G/DL (ref 31.5–36.5)
MCV RBC AUTO: 89 FL (ref 78–100)
MONOCYTES # BLD AUTO: 0.6 10E3/UL (ref 0–1.3)
MONOCYTES NFR BLD AUTO: 7 %
NEUTROPHILS # BLD AUTO: 5.7 10E3/UL (ref 1.6–8.3)
NEUTROPHILS NFR BLD AUTO: 65 %
NRBC # BLD AUTO: 0 10E3/UL
NRBC BLD AUTO-RTO: 0 /100
P AXIS - MUSE: 56 DEGREES
PLATELET # BLD AUTO: 269 10E3/UL (ref 150–450)
POTASSIUM SERPL-SCNC: 3.5 MMOL/L (ref 3.4–5.3)
PR INTERVAL - MUSE: 144 MS
PROT SERPL-MCNC: 7.8 G/DL (ref 6.4–8.3)
QRS DURATION - MUSE: 84 MS
QT - MUSE: 336 MS
QTC - MUSE: 435 MS
R AXIS - MUSE: 81 DEGREES
RBC # BLD AUTO: 4.61 10E6/UL (ref 3.8–5.9)
SODIUM SERPL-SCNC: 142 MMOL/L (ref 136–145)
SYSTOLIC BLOOD PRESSURE - MUSE: NORMAL MMHG
T AXIS - MUSE: 30 DEGREES
TROPONIN T SERPL HS-MCNC: <6 NG/L
VENTRICULAR RATE- MUSE: 101 BPM
WBC # BLD AUTO: 8.7 10E3/UL (ref 4–11)

## 2023-04-21 PROCEDURE — 84484 ASSAY OF TROPONIN QUANT: CPT | Performed by: EMERGENCY MEDICINE

## 2023-04-21 PROCEDURE — 84703 CHORIONIC GONADOTROPIN ASSAY: CPT | Performed by: EMERGENCY MEDICINE

## 2023-04-21 PROCEDURE — 99285 EMERGENCY DEPT VISIT HI MDM: CPT | Mod: 25 | Performed by: EMERGENCY MEDICINE

## 2023-04-21 PROCEDURE — 36415 COLL VENOUS BLD VENIPUNCTURE: CPT | Performed by: EMERGENCY MEDICINE

## 2023-04-21 PROCEDURE — 85379 FIBRIN DEGRADATION QUANT: CPT | Performed by: EMERGENCY MEDICINE

## 2023-04-21 PROCEDURE — 93010 ELECTROCARDIOGRAM REPORT: CPT | Performed by: EMERGENCY MEDICINE

## 2023-04-21 PROCEDURE — 80053 COMPREHEN METABOLIC PANEL: CPT | Performed by: EMERGENCY MEDICINE

## 2023-04-21 PROCEDURE — 99284 EMERGENCY DEPT VISIT MOD MDM: CPT | Mod: 25 | Performed by: EMERGENCY MEDICINE

## 2023-04-21 PROCEDURE — 93005 ELECTROCARDIOGRAM TRACING: CPT | Performed by: EMERGENCY MEDICINE

## 2023-04-21 PROCEDURE — 71046 X-RAY EXAM CHEST 2 VIEWS: CPT

## 2023-04-21 PROCEDURE — 85025 COMPLETE CBC W/AUTO DIFF WBC: CPT | Performed by: EMERGENCY MEDICINE

## 2023-04-21 PROCEDURE — 71046 X-RAY EXAM CHEST 2 VIEWS: CPT | Mod: 26 | Performed by: RADIOLOGY

## 2023-04-21 ASSESSMENT — ACTIVITIES OF DAILY LIVING (ADL): ADLS_ACUITY_SCORE: 35

## 2023-04-21 NOTE — ED PROVIDER NOTES
[place] EMERGENCY DEPARTMENT ([facility])    4/21/23      History     Chief Complaint   Patient presents with     Chest Pain     HPI  Shiloh Cheatham is a 23 year old adult with a past medical history which includes PTSD who presents to the Emergency Department for evaluation of chest pain. Patient reports that a few hours ago she noticed onset of midline to left chest pain that has gradually worsened since onset. States that the pain feels like pressure in her chest, and that she initially thought it was a panic attack before it worsened. States that the pain has been particularly worse over the last 3-5 minutes. She has had similar pain in the past that resolved on its own. Has noticed some improvement with walking but has not identified anything else that makes the pain better. Denies leg swelling.    Otherwise, patient denies recent travel, tobacco drug or alcohol use, or past history of problems with diabetes, blood pressure or cholesterol. Denies any hormone treatments. She does note that her parents have a history of high cholesterol and that her grandmother passed away from a heart attack at an elderly age.    Past Medical History  No past medical history on file.  Past Surgical History:   Procedure Laterality Date     ARTHROSCOPY SHOULDER SUPERIOR LABRUM ANTERIOR TO POSTERIOR REPAIR Right 3/29/2019    Procedure: Right Shoulder Arthroscopic Labral Repair;  Surgeon: Adina Cortes MD;  Location: [facility] OR     albuterol (PROAIR HFA/PROVENTIL HFA/VENTOLIN HFA) 108 (90 Base) MCG/ACT inhaler  ARIPiprazole (ABILIFY) 2 MG tablet  ARIPiprazole (ABILIFY) 5 MG tablet  hydrOXYzine (ATARAX) 25 MG tablet      Allergies   Allergen Reactions     Hydroxyzine Palpitations     Family History  Family History   Problem Relation Age of Onset     Anxiety Disorder Mother      Anxiety Disorder Sister      Social History   Social History     Tobacco Use     Smoking status: Never     Smokeless tobacco: Never    Vaping Use     Vaping status: Former     Substances: Nicotine, Salts     Devices: Refillable tank   Substance Use Topics     Alcohol use: No     Comment: occ     Drug use: No      Past medical history, past surgical history, medications, allergies, family history, and social history were reviewed with the patient. No additional pertinent items.      A complete review of systems was performed with pertinent positives and negatives noted in the HPI, and all other systems negative.    Physical Exam   BP: 128/82  Pulse: 111  Temp: 97.9  F (36.6  C)  Resp: 16  SpO2: 100 %  Physical Exam  Vitals and nursing note reviewed.   Constitutional:       General: Lc Cheatham is not in acute distress.     Appearance: Normal appearance. Lc Cheatham is not ill-appearing or toxic-appearing.   HENT:      Head: Normocephalic and atraumatic.      Nose: Nose normal.      Mouth/Throat:      Mouth: Mucous membranes are moist.   Eyes:      Pupils: Pupils are equal, round, and reactive to light.   Cardiovascular:      Rate and Rhythm: Normal rate.      Pulses: Normal pulses.      Heart sounds: Normal heart sounds.   Pulmonary:      Effort: Pulmonary effort is normal. No respiratory distress.      Breath sounds: Normal breath sounds.   Abdominal:      General: Abdomen is flat. There is no distension.   Musculoskeletal:         General: No swelling or deformity. Normal range of motion.      Cervical back: Normal range of motion. No rigidity.   Skin:     General: Skin is warm.      Capillary Refill: Capillary refill takes less than 2 seconds.   Neurological:      Mental Status: Lc Cheatham is alert and oriented to person, place, and time.   Psychiatric:         Mood and Affect: Mood normal.         ED Course, Procedures, & Data   6:37 PM  The patient was seen and examined by Ariel Sexton DO in triage.      ED Course as of 04/21/23 2304 Fri Apr 21, 2023   1900      EKG Interpretation:     Interpreted by Ariel Sexton DO  Time  reviewed:1900   Symptoms at time of EKG: L chest pain   Rhythm: sinus tach  Rate: normal  Axis: NORMAL  Ectopy: none  Conduction: normal  ST Segments/ T Waves: No ST-T wave changes  Q Waves: none  Comparison to prior: No old EKG available    Clinical Impression: normal EKG             Results for orders placed or performed during the hospital encounter of 04/21/23   XR Chest 2 Views     Status: None    Narrative    EXAM: XR CHEST 2 VIEWS  4/21/2023 7:52 PM     HISTORY:  L chest pain       COMPARISON:  None    FINDINGS:   PA and lateral views of the chest. Normal heart size. No airspace  opacities. No pleural effusion or pneumothorax. The osseous structures  are within normal limits.      Impression    IMPRESSION:   No acute findings in the chest.    I have personally reviewed the examination and initial interpretation  and I agree with the findings.    GERRI PHILLIPS DO         SYSTEM ID:  [id number]   Comprehensive metabolic panel     Status: Abnormal   Result Value Ref Range    Sodium 142 136 - 145 mmol/L    Potassium 3.5 3.4 - 5.3 mmol/L    Chloride 106 98 - 107 mmol/L    Carbon Dioxide (CO2) 23 22 - 29 mmol/L    Anion Gap 13 7 - 15 mmol/L    Urea Nitrogen 10.0 6.0 - 20.0 mg/dL    Creatinine 0.85 0.51 - 1.17 mg/dL    Calcium 9.5 8.6 - 10.0 mg/dL    Glucose 125 (H) 70 - 99 mg/dL    Alkaline Phosphatase 82 35 - 129 U/L    AST 17 10 - 50 U/L    ALT 14 10 - 50 U/L    Protein Total 7.8 6.4 - 8.3 g/dL    Albumin 4.7 3.5 - 5.2 g/dL    Bilirubin Total 0.4 <=1.2 mg/dL    GFR Estimate >90 >60 mL/min/1.73m2    Narrative    The sex of this patient cannot be reliably determined based on discrepancies in demographics (legal sex, sex assigned at birth, gender identity).  Both male and female reference ranges are provided where applicable.  Careful evaluation of the patient s results as compared to the gender specific reference intervals is required in this setting.    Troponin T, High Sensitivity     Status: Normal   Result  Value Ref Range    Troponin T, High Sensitivity <6 <=22 ng/L   HCG qualitative Blood     Status: Normal   Result Value Ref Range    hCG Serum Qualitative Negative Negative   CBC with platelets and differential     Status: None   Result Value Ref Range    WBC Count 8.7 4.0 - 11.0 10e3/uL    RBC Count 4.61 3.80 - 5.90 10e6/uL    Hemoglobin 13.1 11.7 - 17.7 g/dL    Hematocrit 41.0 35.0 - 53.0 %    MCV 89 78 - 100 fL    MCH 28.4 26.5 - 33.0 pg    MCHC 32.0 31.5 - 36.5 g/dL    RDW 12.7 10.0 - 15.0 %    Platelet Count 269 150 - 450 10e3/uL    % Neutrophils 65 %    % Lymphocytes 24 %    % Monocytes 7 %    % Eosinophils 3 %    % Basophils 1 %    % Immature Granulocytes 0 %    NRBCs per 100 WBC 0 <1 /100    Absolute Neutrophils 5.7 1.6 - 8.3 10e3/uL    Absolute Lymphocytes 2.1 0.8 - 5.3 10e3/uL    Absolute Monocytes 0.6 0.0 - 1.3 10e3/uL    Absolute Eosinophils 0.3 0.0 - 0.7 10e3/uL    Absolute Basophils 0.0 0.0 - 0.2 10e3/uL    Absolute Immature Granulocytes 0.0 <=0.4 10e3/uL    Absolute NRBCs 0.0 10e3/uL    Narrative    The sex of this patient cannot be reliably determined based on discrepancies in demographics (legal sex, sex assigned at birth, gender identity).  Both male and female reference ranges are provided where applicable.  Careful evaluation of the patient s results as compared to the gender specific reference intervals is required in this setting.    D dimer quantitative     Status: Normal   Result Value Ref Range    D-Dimer Quantitative 0.33 0.00 - 0.50 ug/mL FEU    Narrative    This D-dimer assay is intended for use in conjunction with a clinical pretest probability assessment model to exclude pulmonary embolism (PE) and deep venous thrombosis (DVT) in outpatients suspected of PE or DVT. The cut-off value is 0.50 ug/mL FEU.   EKG 12-lead, tracing only     Status: None   Result Value Ref Range    Systolic Blood Pressure  mmHg    Diastolic Blood Pressure  mmHg    Ventricular Rate 101 BPM    Atrial Rate 101 BPM     SD Interval 144 ms    QRS Duration 84 ms     ms    QTc 435 ms    P Axis 56 degrees    R AXIS 81 degrees    T Axis 30 degrees    Interpretation ECG       Sinus tachycardia  Otherwise normal ECG  Unconfirmed report - interpretation of this ECG is computer generated - see medical record for final interpretation  Confirmed by - EMERGENCY ROOM, PHYSICIAN ([id number]),  JAKE DELEON ([id number]) on 4/21/2023 8:12:36 PM     CBC with platelets differential     Status: None    Narrative    The following orders were created for panel order CBC with platelets differential.  Procedure                               Abnormality         Status                     ---------                               -----------         ------                     CBC with platelets and d...[[id number]]                      Final result                 Please view results for these tests on the individual orders.     Medications - No data to display  Labs Ordered and Resulted from Time of ED Arrival to Time of ED Departure   COMPREHENSIVE METABOLIC PANEL - Abnormal       Result Value    Sodium 142      Potassium 3.5      Chloride 106      Carbon Dioxide (CO2) 23      Anion Gap 13      Urea Nitrogen 10.0      Creatinine 0.85      Calcium 9.5      Glucose 125 (*)     Alkaline Phosphatase 82      AST 17      ALT 14      Protein Total 7.8      Albumin 4.7      Bilirubin Total 0.4      GFR Estimate >90     TROPONIN T, HIGH SENSITIVITY - Normal    Troponin T, High Sensitivity <6     HCG QUALITATIVE PREGNANCY - Normal    hCG Serum Qualitative Negative     D DIMER QUANTITATIVE - Normal    D-Dimer Quantitative 0.33     CBC WITH PLATELETS AND DIFFERENTIAL    WBC Count 8.7      RBC Count 4.61      Hemoglobin 13.1      Hematocrit 41.0      MCV 89      MCH 28.4      MCHC 32.0      RDW 12.7      Platelet Count 269      % Neutrophils 65      % Lymphocytes 24      % Monocytes 7      % Eosinophils 3      % Basophils 1      % Immature Granulocytes 0       NRBCs per 100 WBC 0      Absolute Neutrophils 5.7      Absolute Lymphocytes 2.1      Absolute Monocytes 0.6      Absolute Eosinophils 0.3      Absolute Basophils 0.0      Absolute Immature Granulocytes 0.0      Absolute NRBCs 0.0       XR Chest 2 Views   Final Result   IMPRESSION:    No acute findings in the chest.      I have personally reviewed the examination and initial interpretation   and I agree with the findings.      GERRI PHILLIPS DO            SYSTEM ID:  [id number]             Critical care was not performed.     Medical Decision Making  The patient's presentation was of high complexity (an acute health issue posing potential threat to life or bodily function).    The patient's evaluation involved:  ordering and/or review of 3+ test(s) in this encounter (see separate area of note for details)    The patient's management necessitated only low risk treatment.      Assessment & Plan    Patient presents the ED for evaluation of left-sided chest pain.  Differential diagnose includes ACS, PE, aortic dissection, costochondritis, pneumonia, pneumothorax.    On arrival, patient slightly tachycardic, this is normal vital signs.  Overall appears well and nontoxic.  Unremarkable auscultation of lungs/heart sounds.  No edema or signs of fluid overload.    EKG shows normal sinus rhythm without signs of acute ischemia.  Troponin is negative.  Patient with low heart score, no indication for further enzyme trend or provocative testing.  Suspicion for ACS.  D-dimer is negative, unlikely PE.  This lessens risk of aortic dissection as well.  Furthermore, no characteristic ripping/tearing pain rating to the back, associated neurodeficit, wide mediastinum.  Low suspicion for dissection.    Laboratory work-up is unremarkable.    On reassessment, patient continues to appear well.  The exact etiology of her pain is unclear.  Could possibly related to anxiety as she suggested.  Regardless, no evidence of emergent pathology.   Recommend PCP follow-up in 3 days.  Educated reasons to return to the ED.      I have reviewed the nursing notes. I have reviewed the findings, diagnosis, plan and need for follow up with the patient.    Discharge Medication List as of 4/21/2023  8:52 PM          Final diagnoses:   Chest pain, unspecified type     I, Winsome Hinds, am serving as a trained medical scribe to document services personally performed by Ariel Sexton DO, based on the provider's statements to me.      I, Ariel Sexton DO, was physically present and have reviewed and verified the accuracy of this note documented by Winsome Hinds.    Ariel Sexton DO  [facility] EMERGENCY DEPARTMENT  4/21/2023     Ariel Sexton DO  04/21/23 [id number]

## 2023-04-21 NOTE — ED TRIAGE NOTES
"Pt c/o chest pain that started earlier today. Originally thought she was anxious, but pain has continued to worsen. Midline to L anterior chest. L arm feels \"funny\" like sensation is slightly different than usual. Denies numbness/tingling     Denies SOB       "

## 2023-04-22 NOTE — DISCHARGE INSTRUCTIONS
Your work-up in the emergency department including labs, x-ray, EKG, did not reveal any medical emergencies to explain your chest pain.  The exact cause is clear but your work-up thus far is reassuring.  You should follow-up with your primary care physician within the next 3 to 5 days for reassessment of your symptoms.  Return to the ED with any new or worsening symptoms.

## 2023-04-23 ENCOUNTER — MYC MEDICAL ADVICE (OUTPATIENT)
Dept: FAMILY MEDICINE | Facility: CLINIC | Age: 23
End: 2023-04-23
Payer: COMMERCIAL

## 2023-04-24 ASSESSMENT — ANXIETY QUESTIONNAIRES
7. FEELING AFRAID AS IF SOMETHING AWFUL MIGHT HAPPEN: NEARLY EVERY DAY
GAD7 TOTAL SCORE: 20
6. BECOMING EASILY ANNOYED OR IRRITABLE: MORE THAN HALF THE DAYS
7. FEELING AFRAID AS IF SOMETHING AWFUL MIGHT HAPPEN: NEARLY EVERY DAY
GAD7 TOTAL SCORE: 20
IF YOU CHECKED OFF ANY PROBLEMS ON THIS QUESTIONNAIRE, HOW DIFFICULT HAVE THESE PROBLEMS MADE IT FOR YOU TO DO YOUR WORK, TAKE CARE OF THINGS AT HOME, OR GET ALONG WITH OTHER PEOPLE: VERY DIFFICULT
1. FEELING NERVOUS, ANXIOUS, OR ON EDGE: NEARLY EVERY DAY
4. TROUBLE RELAXING: NEARLY EVERY DAY
2. NOT BEING ABLE TO STOP OR CONTROL WORRYING: NEARLY EVERY DAY
3. WORRYING TOO MUCH ABOUT DIFFERENT THINGS: NEARLY EVERY DAY
5. BEING SO RESTLESS THAT IT IS HARD TO SIT STILL: NEARLY EVERY DAY
8. IF YOU CHECKED OFF ANY PROBLEMS, HOW DIFFICULT HAVE THESE MADE IT FOR YOU TO DO YOUR WORK, TAKE CARE OF THINGS AT HOME, OR GET ALONG WITH OTHER PEOPLE?: VERY DIFFICULT
GAD7 TOTAL SCORE: 20

## 2023-04-25 ENCOUNTER — VIRTUAL VISIT (OUTPATIENT)
Dept: PSYCHOLOGY | Facility: CLINIC | Age: 23
End: 2023-04-25
Payer: COMMERCIAL

## 2023-04-25 DIAGNOSIS — F60.3 BORDERLINE PERSONALITY DISORDER (H): ICD-10-CM

## 2023-04-25 DIAGNOSIS — F25.1 SCHIZOAFFECTIVE DISORDER, DEPRESSIVE TYPE (H): Primary | ICD-10-CM

## 2023-04-25 PROCEDURE — 90834 PSYTX W PT 45 MINUTES: CPT | Mod: VID

## 2023-04-25 NOTE — PROGRESS NOTES
[facility]                                     Progress Note    Patient Name: Shiloh Cheatham (Alex)  Date: 23         Service Type: Individual      Session Start Time: 08:01 AM   Session End Time: 08:53 AM      Session Length: 52 Minutes    Session #: 5    Attendees: Client attended alone    Service Modality:  Video Visit:      Provider verified identity through the following two step process.  Patient provided:  Patient  and Patient address    Telemedicine Visit: The patient's condition can be safely assessed and treated via synchronous audio and visual telemedicine encounter.      Reason for Telemedicine Visit: Services only offered telehealth    Originating Site (Patient Location): Patient's home    Distant Site (Provider Location): Provider Remote Setting- Home Office    Consent:  The patient/guardian has verbally consented to: the potential risks and benefits of telemedicine (video visit) versus in person care; bill my insurance or make self-payment for services provided; and responsibility for payment of non-covered services.     Patient would like the video invitation sent by:  My Chart    Mode of Communication:  Video Conference via [facility]    Distant Location (Provider):  Off-site    As the provider I attest to compliance with applicable laws and regulations related to telemedicine.    DATA  Interactive Complexity: No  Crisis: No        Progress Since Last Session (Related to Symptoms / Goals / Homework):   Symptoms: No change Patient continues to experience symptoms of anxiety    Homework: Partially completed      Episode of Care Goals: Minimal progress - PREPARATION (Decided to change - considering how); Intervened by negotiating a change plan and determining options / strategies for behavior change, identifying triggers, exploring social supports, and working towards setting a date to begin behavior change     Current / Ongoing Stressors and Concerns:   Patient reports  their anxiety has increased .  Patient reports stress and concerns in regards to their new boss and their behavior at work.  Patient also reports recently going to the ER due to chest pains they believe may be a sign of costochondritis.  Patient also reports being concerned about their ability to travel due to their recent physical pains.  Patient also reports being prescribed a temporary prescription for hydroxyzine. They state the prescription has not been helpful as it has continued the patient's experience of heart palpitations.  Patient continues to report difficulty with sleeping and therefore a decrease in energy.  Lc wonders if symptomatic therapy may be helpful to process through their previous trauma.       Treatment Objective(s) Addressed in This Session:   identify their fears / thoughts that contribute to feeling anxious  Increase interest, engagement, and pleasure in doing things  Improve quantity and quality of night time sleep / decrease daytime naps  Identify negative self-talk and behaviors: challenge core beliefs, myths, and actions  Improve concentration, focus, and mindfulness in daily activities   Grounding techniques for anxiety  Medication Compliance      Intervention:   CBT: Provider and patient discussed the connection between their thoughts, feelings, and emotions and its effect on their symptoms of anxiety.   Motivational Interviewing    MI Intervention: Expressed Empathy/Understanding, Supported Autonomy, Collaboration, Evocation, Permission to raise concern or advise and Open-ended questions     Change Talk Expressed by the Patient: Desire to change Reasons to change    Provider Response to Change Talk: E - Evoked more info from patient about behavior change, A - Affirmed patient's thoughts, decisions, or attempts at behavior change and R - Reflected patient's change talk      Assessments completed prior to visit:  The following assessments were completed by patient for this  visit:  PHQ9:       2/3/2020    10:54 AM 2/1/2023    12:39 PM 2/6/2023    11:11 PM 2/15/2023    11:10 AM 3/9/2023    12:44 PM 3/26/2023     1:39 PM 4/11/2023     8:46 AM   PHQ-9 SCORE   PHQ-9 Total Score MyChart  21 (Severe depression) 14 (Moderate depression) 20 (Severe depression) 21 (Severe depression) 12 (Moderate depression)    PHQ-9 Total Score 14 21 14 20 21    21 12 18     GAD7:       5/1/2019     2:46 PM 2/1/2023    12:40 PM 2/15/2023    11:32 AM 3/9/2023    12:45 PM 3/26/2023     1:39 PM 4/11/2023     8:46 AM 4/24/2023     7:13 PM   MYNOR-7 SCORE   Total Score  18 (severe anxiety) 15 (severe anxiety) 18 (severe anxiety) 20 (severe anxiety)  20 (severe anxiety)   Total Score 15 18 15 18 20 18 20     PROMIS 10-Global Health (only subscores and total score):       2/15/2023    11:36 AM 3/9/2023    12:46 PM   PROMIS-10 Scores Only   Global Mental Health Score 6 5   Global Physical Health Score 12 12   PROMIS TOTAL - SUBSCORES 18 17         ASSESSMENT: Current Emotional / Mental Status (status of significant symptoms):   Risk status (Self / Other harm or suicidal ideation)   Patient denies current fears or concerns for personal safety.   Patient denies current or recent suicidal ideation or behaviors.   Patient denies current or recent homicidal ideation or behaviors.   Patient denies current or recent self injurious behavior or ideation.   Patient denies other safety concerns.   Patient reports there has been no change in risk factors since their last session.     Patient reports there has been no change in protective factors since their last session.     Recommended that patient call 911 or go to the local ED should there be a change in any of these risk factors.     Appearance:   Appropriate    Eye Contact:   Good    Psychomotor Behavior: Normal    Attitude:   Cooperative  Pleasant   Orientation:   All   Speech    Rate / Production: Normal     Volume:  Normal    Mood:    Anxious   Normal   Affect:    Appropriate    Thought Content:  Clear    Thought Form:  Coherent  Logical    Insight:    Good      Medication Review:   Changes to psychiatric medications, see updated Medication List in EPIC.      Medication Compliance:   Yes     Changes in Health Issues:   None reported     Chemical Use Review:   Substance Use: Chemical use reviewed, no active concerns identified      Tobacco Use: No current tobacco use.      Diagnosis:  1. Schizoaffective disorder, depressive type (H)    2. Borderline personality disorder (H)        Collateral Reports Completed:   Not Applicable    PLAN: (Patient Tasks / Therapist Tasks / Other)  Patient was encouraged to do some self-care related activities.  Patient was also encouraged to work on techniques to challenge negative core believes and reframe them positively.        PATRICIA IRAHETA    This note has been reviewed and I agree with the plan of care. This note is co-signed by CHARLENE Melendrez, [facility], Supervisor, on: April 26, 2023                                 ______________________________________________________________________    Individual Treatment Plan    Patient's Name: Shiloh Cheatham  YOB: 2000    Date of Creation: 03/28/23  Date Treatment Plan Last Reviewed/Revised: 03/28/23    DSM5 Diagnoses: 295.70  (F25.1) Schizoaffective Disorder Depressive Type or 301.83 (F60.3) Borderline Personality Disorder  Psychosocial / Contextual Factors: Patient reports a recent increase in symptoms of anxiety.  PROMIS (reviewed every 90 days): 17    Referral / Collaboration:  Referral to another professional/service is not indicated at this time..    Anticipated number of session for this episode of care: 9-12 sessions  Anticipation frequency of session: Weekly  Anticipated Duration of each session: 38-52 minutes  Treatment plan will be reviewed in 90 days or when goals have been changed.       MeasurableTreatment Goal(s) related to diagnosis /  functional impairment(s)  Goal 1: Patient will developed and demonstrate coping skills to deal with mood swings and any impulsive behavior.    Objective #A  Patient will Discussed previous or current posttraumatic stress.  Status: New - Date: 03/28/23     Intervention(s)  Therapist will Work with the client remembering the facts of previous trauma, increasing insights to its effects, and reducing self only any self blame..    Objective #B  Patient will Learn and apply any problem solving skills to complex in daily life..  Status: New - Date: 03/28/23     Intervention(s)  Therapist will teach Problem-solving skills supplied to daily life situations that may cause conflict..      Goal 2: Patient will alleviate depressive symptoms and return to previous effective functioning. As evidenced by a PHQ-9 score of 9 or less.  Objective #A (Patient Action)    Status: New - Date: 03/28/23     Patient will Decrease frequency and intensity of feeling down, depressed, hopeless.    Intervention(s)  Therapist will assign homework to aid in decreasing the frequency and intensity of feeling down, depressed, and hopeless..    Objective #B  Patient will Identify negative self-talk and behaviors: challenge core beliefs, myths, and actions.    Status: New - Date: 03/28/23     Intervention(s)  Therapist will teach the patient skills to identify negative self-talk and behaviors: challenge core beliefs, myths, and actions..    Objective #C  Patient will Increase interest, engagement, and pleasure in doing things.  Status: New - Date: 03/28/23     Intervention(s)  Therapist will assign homework to aid in increasing the patient's interest, enagement, and pleasure in doing things.          Patient has not reviewed nor agreed to the above plan.      PATRICIA IRAHETA  March 28, 2023    This note has been reviewed and I agree with the plan of care. This note is co-signed by CHARLENE Melendrez, [facility], Supervisor, on: April 5, 2023

## 2023-04-26 ENCOUNTER — OFFICE VISIT (OUTPATIENT)
Dept: FAMILY MEDICINE | Facility: CLINIC | Age: 23
End: 2023-04-26
Payer: COMMERCIAL

## 2023-04-26 VITALS
HEART RATE: 88 BPM | TEMPERATURE: 97.9 F | OXYGEN SATURATION: 98 % | RESPIRATION RATE: 16 BRPM | HEIGHT: 67 IN | WEIGHT: 208.5 LBS | DIASTOLIC BLOOD PRESSURE: 78 MMHG | BODY MASS INDEX: 32.72 KG/M2 | SYSTOLIC BLOOD PRESSURE: 123 MMHG

## 2023-04-26 DIAGNOSIS — F25.1 SCHIZOAFFECTIVE DISORDER, DEPRESSIVE TYPE (H): ICD-10-CM

## 2023-04-26 DIAGNOSIS — F60.3 BORDERLINE PERSONALITY DISORDER (H): ICD-10-CM

## 2023-04-26 DIAGNOSIS — S29.011D MUSCLE STRAIN OF CHEST WALL, SUBSEQUENT ENCOUNTER: Primary | ICD-10-CM

## 2023-04-26 DIAGNOSIS — F41.9 ANXIETY: ICD-10-CM

## 2023-04-26 PROCEDURE — 99213 OFFICE O/P EST LOW 20 MIN: CPT | Performed by: FAMILY MEDICINE

## 2023-04-26 PROCEDURE — 93000 ELECTROCARDIOGRAM COMPLETE: CPT | Performed by: FAMILY MEDICINE

## 2023-04-26 ASSESSMENT — PAIN SCALES - GENERAL: PAINLEVEL: NO PAIN (0)

## 2023-04-26 NOTE — PROGRESS NOTES
"  Assessment & Plan   Chest wall train, most likely  Plan:encouraged over the counter ibuprofen with meals for next 1-2 days  Warm pack as needed  If pain    Schizoaffective disorder, depressive type (H)  Borderline personality disorder (H)  Plan: has an appointment tomorrow with  provider     Anxiety  Plan: - EKG 12-lead complete w/read - Clinics  Repeat EKG well within normal limits  Reviewed labs and they are normal.  Shiloh Cheatham is not having any more concerns besides the day of emergency department visit- no chest pain, no shortness of breath, no palpitation and that's great.  Adequate reassurance given  Follow up with primary care physicain in 1 month  Seek UC as needed        Estimated body mass index is 32.66 kg/m  as calculated from the following:    Height as of this encounter: 1.702 m (5' 7\").    Weight as of this encounter: 94.6 kg (208 lb 8 oz).           Margarita Braun MD  [facility] UPTOWMARIO Platt is a 23 year old, presenting for the following health issues:  Hospital F/U         View : No data to display.              hospitals       Hospital Follow-up Visit:    Hospital/Nursing Home/IP Rehab Facility: [facility]  Date of Admission: 4/21/23  Date of Discharge: 4/21/23  Reason(s) for Admission: Chest pain    Was your hospitalization related to COVID-19? No   Problems taking medications regularly:  None  Medication changes since discharge: None  Problems adhering to non-medication therapy:  None    Summary of hospitalization:  [facility] discharge summary reviewed  Diagnostic Tests/Treatments reviewed.  Follow up needed: none  Other Healthcare Providers Involved in Patient s Care:         None  Update since discharge: improved.   Plan of care communicated with patient     Evaluated in emergency department- because of substernal chest pain / pressure, initially thought it was panic attack and as it did not " "subside , they were seen at emergency department.  The blood test- EKG- all reassuring.  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 Also enjoy working out. Unsure if might have had some chest strain. No known injury    Abilify worse anxiety, caused movement disorder.  under care of psychiatrist for mood disorder /schizoaffective  Has follow up appointment tomorrow  PMDD-        Review of Systems   Constitutional, HEENT, cardiovascular, pulmonary, GI, , musculoskeletal, neuro, skin, endocrine and psych systems are negative, except as otherwise noted.      Objective    /78   Pulse 88   Temp 97.9  F (36.6  C) (Temporal)   Resp 16   Ht 1.702 m (5' 7\")   Wt 94.6 kg (208 lb 8 oz)   LMP 04/17/2023 (Approximate)   SpO2 98%   BMI 32.66 kg/m    Body mass index is 32.66 kg/m .  Physical Exam   GENERAL: healthy, alert and no distress  NECK: no adenopathy, no asymmetry, masses, or scars and thyroid normal to palpation  RESP: lungs clear to auscultation - no rales, rhonchi or wheezes  CV: regular rate and rhythm, normal S1 S2, no S3 or S4, no murmur, click or rub, no peripheral edema and peripheral pulses strong  ABDOMEN: soft, nontender, no hepatosplenomegaly, no masses and bowel sounds normal  MS: no gross musculoskeletal defects noted, no edema                    "

## 2023-04-27 ENCOUNTER — VIRTUAL VISIT (OUTPATIENT)
Dept: BEHAVIORAL HEALTH | Facility: CLINIC | Age: 23
End: 2023-04-27
Payer: COMMERCIAL

## 2023-04-27 ENCOUNTER — VIRTUAL VISIT (OUTPATIENT)
Dept: PSYCHIATRY | Facility: CLINIC | Age: 23
End: 2023-04-27
Payer: COMMERCIAL

## 2023-04-27 DIAGNOSIS — F25.1 SCHIZOAFFECTIVE DISORDER, DEPRESSIVE TYPE (H): Primary | ICD-10-CM

## 2023-04-27 DIAGNOSIS — F60.3 BORDERLINE PERSONALITY DISORDER (H): ICD-10-CM

## 2023-04-27 DIAGNOSIS — F33.1 DEPRESSION, MAJOR, RECURRENT, MODERATE (H): Primary | ICD-10-CM

## 2023-04-27 PROCEDURE — 90832 PSYTX W PT 30 MINUTES: CPT | Mod: VID | Performed by: SOCIAL WORKER

## 2023-04-27 PROCEDURE — 99214 OFFICE O/P EST MOD 30 MIN: CPT | Mod: VID | Performed by: NURSE PRACTITIONER

## 2023-04-27 RX ORDER — PROPRANOLOL HYDROCHLORIDE 20 MG/1
20 TABLET ORAL 2 TIMES DAILY
Qty: 60 TABLET | Refills: 1 | Status: SHIPPED | OUTPATIENT
Start: 2023-04-27 | End: 2023-06-08

## 2023-04-27 RX ORDER — LURASIDONE HYDROCHLORIDE 40 MG/1
40 TABLET, FILM COATED ORAL
Qty: 30 TABLET | Refills: 1 | Status: SHIPPED | OUTPATIENT
Start: 2023-04-27 | End: 2023-06-02 | Stop reason: SINTOL

## 2023-04-27 ASSESSMENT — PATIENT HEALTH QUESTIONNAIRE - PHQ9
10. IF YOU CHECKED OFF ANY PROBLEMS, HOW DIFFICULT HAVE THESE PROBLEMS MADE IT FOR YOU TO DO YOUR WORK, TAKE CARE OF THINGS AT HOME, OR GET ALONG WITH OTHER PEOPLE: VERY DIFFICULT
SUM OF ALL RESPONSES TO PHQ QUESTIONS 1-9: 18
SUM OF ALL RESPONSES TO PHQ QUESTIONS 1-9: 18

## 2023-04-27 NOTE — PROGRESS NOTES
"[facility]  April 27, 2023      Behavioral Health Clinician Progress Note    Patient Name: Shiloh Cheatham \"Lc\" (they/ their)           Service Type:  Individual      Service Location:   [facility] / Email (patient reached)     Session Start Time: 1200pm  Session End Time: 1216pm      Session Length: 16 - 37      Attendees: Patient     Service Modality:  Video Visit:      Provider verified identity through the following two step process.  Patient provided:  Patient photo and Patient is known previously to provider    Telemedicine Visit: The patient's condition can be safely assessed and treated via synchronous audio and visual telemedicine encounter.      Reason for Telemedicine Visit: Services only offered telehealth    Originating Site (Patient Location): Patient's home    Distant Site (Provider Location): Provider Remote Setting- Home Office    Consent:  The patient/guardian has verbally consented to: the potential risks and benefits of telemedicine (video visit) versus in person care; bill my insurance or make self-payment for services provided; and responsibility for payment of non-covered services.     Patient would like the video invitation sent by:  My Chart    Mode of Communication:  Video Conference via [facility]    Distant Location (Provider):  Off-site    As the provider I attest to compliance with applicable laws and regulations related to telemedicine.    Visit Activities (Refresh list every visit): [facility] Only    Diagnostic Assessment Date: 3/10/2023  Treatment Plan Review Date: 7/27/2023  See Flowsheets for today's PHQ-9 and MYNOR-7 results  Previous PHQ-9:       3/26/2023     1:39 PM 4/11/2023     8:46 AM 4/27/2023     9:09 AM   PHQ-9 SCORE   PHQ-9 Total Score MyChart 12 (Moderate depression)  18 (Moderately severe depression)   PHQ-9 Total Score 12 18 18     Previous MYNOR-7:       3/26/2023     1:39 PM 4/11/2023     8:46 AM 4/24/2023     7:13 PM   MYNOR-7 " "SCORE   Total Score 20 (severe anxiety)  20 (severe anxiety)   Total Score 20 18 20       AMANDA LEVEL:       View : No data to display.                DATA  Extended Session (60+ minutes): No  Interactive Complexity: No  Crisis: No  [facility] Patient: No    Treatment Objective(s) Addressed in This Session:  Target Behavior(s):  improve mood stability    Depressed Mood: Decrease frequency and intensity of feeling down, depressed, hopeless    Current Stressors / Issues:   update: Pt reports that abilify didn't work well due to increased anxiety, restlessness and interfered in her sleep but they noticed that their hallucinations stopped. They stopped taking it 2 weeks after consulting prescriber. They report that since then they are back baseline and hallucinations have started (seeing things in their peripheral vision, things moving when they aren't). Mood has been very unstable. [facility] explored how therapy is going. Pt feels it is going as should be expected early in the therapeutic relationship (establishing rapport etc). Pt feels it is a good fit. They would like to start somatic therapy in conjunction with talk therapy. They need to find out about coverage.   Stresses: changes at work, girlfriends sister   Appetite: unremarkable  Sleep: sporadic, trouble falling asleep, restful sleep  Therapy: Mary Verduzco at [facility]; ADHD testing scheduled in May  RANDAL: No  Preg: No  Goals/progress on goals: \"more manageable anxiety, panic attacks going away, mood stability, alleviated depression\"  Most important: med update      Progress on Treatment Objective(s) / Homework:  No improvement - ACTION (Actively working towards change); Intervened by reinforcing change plan / affirming steps taken    Motivational Interviewing    MI Intervention: Co-Developed Goal: improve mood stability, Expressed Empathy/Understanding, Open-ended questions, Reflections: simple and complex, Change talk (evoked) and Reframe     Change Talk Expressed by the " Patient: Desire to change Ability to change Reasons to change Need to change Committment to change Activation Taking steps    Provider Response to Change Talk: E - Evoked more info from patient about behavior change, A - Affirmed patient's thoughts, decisions, or attempts at behavior change, R - Reflected patient's change talk and S - Summarized patient's change talk statements      Care Plan review completed: Yes    Medication Review:  No changes to current psychiatric medication(s)    Medication Compliance:  Yes    Changes in Health Issues:   None reported    Chemical Use Review:   Substance Use: Chemical use reviewed, no active concerns identified      Tobacco Use: No current tobacco use.      Assessment: Current Emotional / Mental Status (status of significant symptoms):  Risk status (Self / Other harm or suicidal ideation)  Patient denies a history of suicidal ideation, suicide attempts, self-injurious behavior, homicidal ideation, homicidal behavior and and other safety concerns  Patient denies current fears or concerns for personal safety.  Patient denies current or recent suicidal ideation or behaviors.  Patient denies current or recent homicidal ideation or behaviors.  Patient denies current or recent self injurious behavior or ideation.  Patient denies other safety concerns.  A safety and risk management plan has not been developed at this time, however patient was encouraged to call [facility] should there be a change in any of these risk factors.    Appearance:   Appropriate   Eye Contact:   Good   Psychomotor Behavior: Normal   Attitude:   Cooperative   Orientation:   All  Speech   Rate / Production: Normal    Volume:  Normal   Mood:    Anxious   Affect:    Congruent   Thought Content:  Clear   Thought Form:  Coherent  Logical   Insight:    Good     Diagnoses:  1. Depression, major, recurrent, moderate (H)        Collateral Reports Completed:  Collaborated with [facility] team    Plan: (Homework,  other):  Patient was given information about behavioral services and encouraged to schedule a follow up appointment with the clinic [facility] in 1 month.  Lc Cheatham was also given information about mental health symptoms and treatment options .  CD Recommendations: No indications of CD issues. Marimar Webb [facility]         ______________________________________________________________________    Integrated Primary Care Behavioral Health Treatment Plan    Patient's Name: Shiloh Cheatham  YOB: 2000    Date of Creation: 4/27/2023  Date Treatment Plan Last Reviewed/Revised: pending    DSM5 Diagnoses: 296.32 (F33.1) Major Depressive Disorder, Recurrent Episode, Moderate _ and With anxious distress  Psychosocial / Contextual Factors: hx of trauma, TBI  PROMIS (reviewed every 90 days): 3/10/2023 (17)    Referral / Collaboration:  Collaborated with [facility] team.    Anticipated number of session for this episode of care: 10-12  Anticipation frequency of session: Monthly  Anticipated Duration of each session: 16-37 minutes  Treatment plan will be reviewed in 90 days or when goals have been changed.       MeasurableTreatment Goal(s) related to diagnosis / functional impairment(s)  Goal 1: Patient will experience improved mood    I will know I've met my goal when more manageable anxiety, panic attacks going away, mood stability, alleviated depression.      Objective #A (Patient Action)    Patient will Decrease frequency and intensity of feeling down, depressed, hopeless.  Status: New - Date: 4/27/2023      Intervention(s)  Therapist will teach emotional regulation skills. to manage depression .      Patient has reviewed and agreed to the above plan.      Marimar Webb, [facility]  April 27, 2023
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
## 2023-04-27 NOTE — PATIENT INSTRUCTIONS
**For crisis resources, please see the information at the end of this document**     Thank you for coming to the [facility] [facility].    TREATMENT PLAN:    MEDICATIONS:   - start Latuda titrated to 40 mg and propranolol 20 mg twice a day     -PSYCHOEDUCATION: Risks of antipsychotic medications:  metabolic disorder and movement disorder, and the need for blood monitoring of sugar and lipids/cholesterol while taking the medication.       CONSULTS/REFERRALS:   Continue therapy     LABS/PROCEDURES:    Please call your [facility] clinic and ask for a lab only appointment at your earliest convenience.  If your results are reassuring or normal they will be mailed to you or sent through [facility] within 7 days. If the lab tests need quick action we will call you with the results. The phone number we will call with results is # [phone].      FOLLOW UP: Schedule an appointment with me in 5 weeks  or sooner as needed.  The intake team should be calling you to schedule.  If you dont hear from them, or they were unable to reach you, please call [phone] to schedule.  Follow up with primary care provider as planned or for acute medical concerns.  Call the psychiatric nurse line with medication questions or concerns at [phone].      Medication Refills:  If you need any refills please call your pharmacy and they will contact us. Our fax number for refills is [phone]. Please allow three business for refill processing. If you need to  your refill at a new pharmacy, please contact the new pharmacy directly. The new pharmacy will help you get your medications transferred.     [facility] Assistance [phone]  Financial Assistance [phone]  [facility] Billing [phone]  Central Billing Office, [facility]: [phone]  [facility] Billing [phone]  Medical Records [phone]  [facility] Patient Bill of Rights  [email or web address]       MENTAL HEALTH CRISIS RESOURCES:  For a emergency help, please call 911 or go to the nearest Emergency Department.     Emergency Walk-In Options:   EmPATH Unit @ [facility] ([place]): [phone] - Specialized mental health emergency area designed to be calming  [facility] ([place]): [phone]  [facility] ([place]): [phone]  [facility]): [phone]    National Crisis Information: Call 988 Suicide and Crisis Lifeline  Crisis Text Line (free 24/7):  call **CRISIS (**234640) Crisis or use the texting option by texting 404521.   National Suicide Prevention Lifeline: Call 988  Poison Control Center: 4-935-339-2231  Trans Lifeline: 7-232-500-6654 - Hotline for transgender people of all ages  [employer]: 3-367-311-2202 - Hotline for LGBT youth   List of all [facility] resources:   https://[place].gov/dhs/people-we-serve/adults/health-care/mental-health/resources/crisis-contacts.jsp    For Non-Emergency Support:   Fast Tracker: Mental Health & Substance Use Disorder Resources -   https://www.fasttrackermn.org/       Again thank you for choosing [facility] MENTAL HEALTH & ADDICTION [facility] and please let us know how we can best partner with you to improve you and your family's health.    You may be receiving a survey regarding this appointment. We would love to have your feedback, both positive and negative. The survey is done by an external company, so your answers are anonymous.

## 2023-04-27 NOTE — NURSING NOTE
Is the patient currently in the state of [place]? YES    Visit mode:VIDEO    If the visit is dropped, the patient can be reconnected by: VIDEO VISIT: Text to cell phone: [phone]    Will anyone else be joining the visit? NO      How would you like to obtain your AVS? MyChart    Are changes needed to the allergy or medication list? NO    Reason for visit: Video Visit

## 2023-04-28 ENCOUNTER — OFFICE VISIT (OUTPATIENT)
Dept: FAMILY MEDICINE | Facility: CLINIC | Age: 23
End: 2023-04-28
Payer: COMMERCIAL

## 2023-04-28 VITALS
WEIGHT: 209 LBS | HEIGHT: 67 IN | BODY MASS INDEX: 32.8 KG/M2 | HEART RATE: 73 BPM | SYSTOLIC BLOOD PRESSURE: 113 MMHG | DIASTOLIC BLOOD PRESSURE: 73 MMHG

## 2023-04-28 DIAGNOSIS — F64.9 GENDER DYSPHORIA: Primary | ICD-10-CM

## 2023-04-28 DIAGNOSIS — F60.3 BORDERLINE PERSONALITY DISORDER (H): ICD-10-CM

## 2023-04-28 DIAGNOSIS — F43.10 PTSD (POST-TRAUMATIC STRESS DISORDER): ICD-10-CM

## 2023-04-28 DIAGNOSIS — F25.1 SCHIZOAFFECTIVE DISORDER, DEPRESSIVE TYPE (H): ICD-10-CM

## 2023-04-28 PROCEDURE — 99214 OFFICE O/P EST MOD 30 MIN: CPT | Performed by: FAMILY MEDICINE

## 2023-04-28 NOTE — PROGRESS NOTES
Medical Evaluation for GAHT          HPI    CC: Here for medical evaluation for [facility] with testosterone  HPI:  They have a long standing history of gender dysphoria; the gender history and psychosocial assessment was peformed on 3/28/2023. See this document for gender history.      Current medical conditions:  Patient Active Problem List   Diagnosis     Bipolar disorder, current episode mixed, mild (H)     Hx of traumatic brain injury     Schizoaffective disorder, depressive type (H)     Borderline personality disorder (H)     Mild neurocognitive disorder     High risk medication use     Gender dysphoria     PTSD (post-traumatic stress disorder)     Anxiety    Asthma, well controlled  Chronic pain knees, hips from old injuries  Still working with psychiatry, has new round of medication starting next week: Latuda and propranol.        Allergies   Allergen Reactions     Hydroxyzine Palpitations        Current medications:  Current Outpatient Medications   Medication     albuterol (PROAIR HFA/PROVENTIL HFA/VENTOLIN HFA) 108 (90 Base) MCG/ACT inhaler     lurasidone (LATUDA) 40 MG TABS tablet     propranolol (INDERAL) 20 MG tablet     No current facility-administered medications for this visit.        Past Medical History:  No past medical history on file.   Past Surgical History:   Procedure Laterality Date     ARTHROSCOPY SHOULDER SUPERIOR LABRUM ANTERIOR TO POSTERIOR REPAIR Right 3/29/2019    Procedure: Right Shoulder Arthroscopic Labral Repair;  Surgeon: Adina Cortes MD;  Location: [facility] OR      Childhood hospitalization for gallbladder infection, no surgery    Family History:  Family History   Problem Relation Age of Onset     Anxiety Disorder Mother      Anxiety Disorder Sister     Mother--HTN, asthma, migraines  Father--lipids, HTN  Sister--asthma  M. Grandmother--arthritis, allergies  P. grandmother--lung cancer  Mat grandfather ([place])  P. Grandfather--ALS or similar        Social History:  Standard  "+dairy  Never smoker  Work--retail  Live with girlfriend  No children  Activity: work out 1-4x/wk, mostly weight tainig, walk 20,000 steps/day  Menses regular, last 4-5 days    Sexual History:  Currently sexually active with girlfriend  No outside partner  Life time 5 partners  No hx STI  HIV negative tests 4/2021  HPV vaccinated  PAP neg 2023         ROS  Chest pain last week, went to ED, work up negative--anxiety and muscle straing  Tension HA 2x/wk  Nerve in R arm from old shoulder surgery  Rest neg except mental health    Physical Exam  Blood pressure 113/73, pulse 73, height 1.702 m (5' 7\"), weight 94.8 kg (209 lb), last menstrual period 04/17/2023.  Body mass index is 32.73 kg/m .    EXAM:  Constitutional: healthy, alert and no distress   Cardiovascular: negative, PMI normal. No lifts, heaves, or thrills. RRR. No murmurs, clicks gallops or rub  Respiratory: negative, Percussion normal. Good diaphragmatic excursion. Lungs clear  Psychiatric: mentation appears normal and affect normal/bright  Neck: Neck supple. No adenopathy. Thyroid symmetric, normal size,  Abdomen: Abdomen soft, non-tender. BS normal. No masses, organomegaly  NEURO: Gait normal. Reflexes normal and symmetric. Sensation grossly WNL.  SKIN: no suspicious lesions or rashes  LYMPH: Normal cervical lymph nodes  JOINT/EXTREMITIES: extremities normal- no gross deformities noted, gait normal and normal muscle tone     Reviewed lab results from 4/21/2023 and 3/303/2023:  CMP, lipid panel and glucose all within normal range      A/P  1. Gender dysphoria in adult  2 Schizoaffective disorder  3. BPD  4. PTSD    The masculinizing effects of hormone therapy were discussed at length, along the variability of outcomes and general timeframe for expected masculinizing changes. Permanent vs semi-reversible changes were reviewed.   Reviewed that testosterone is an FDA controlled substance and that all prescriptions must be managed accordingly.  Patient was " counseled regarding the potential risks and side effects of masculinizing therapy including:  - reduced fertility, reproductive options, need for ongoing contraception (if indicated) due to effects on developing fetus  -changes to sexual function including clitoral growth  -potential for weight gain, elevated cholesterol, and other indirect metabolic effects on blood pressure or glucose  -increased risk of erythrocytosis and rare risks associated with this including thrombosis   --changes to liver function tests  --mood changes, long term cardiovascular risks, potential undetermined cancer risks.      Medications used in hormone therapy and methods of administration were reviewed.    I discussed the possible risk of temporary or irreversible impairment of the fertility with the patient today. They demonstrated a complete understanding of the fertility preservation options and declined fertility preservation.   Questions were elicited and answered, and patient verbally consent to begin hormone therapy.    No absolute contraindications to GAHT, however, current complex mental health conditions are not yet stabilized to a point where they would not impact treatment at this time. Patient agrees with this assessment    Plan:   To continue psychiatric care with goal of stable and improving on new medication for at least 1 month  Pt notes work is busy and stressful over summer, feels able to to wait until September if needed to start GAHT  Plan to use topical testosterone for even serum levels, titrate up from lower dose, achevie voice drop and then lower dose again     follow-up July to reevaluate

## 2023-05-04 ENCOUNTER — VIRTUAL VISIT (OUTPATIENT)
Dept: PSYCHOLOGY | Facility: CLINIC | Age: 23
End: 2023-05-04
Payer: COMMERCIAL

## 2023-05-04 DIAGNOSIS — F25.1 SCHIZOAFFECTIVE DISORDER, DEPRESSIVE TYPE (H): Primary | ICD-10-CM

## 2023-05-04 DIAGNOSIS — F60.3 BORDERLINE PERSONALITY DISORDER (H): ICD-10-CM

## 2023-05-04 PROCEDURE — 90834 PSYTX W PT 45 MINUTES: CPT | Mod: VID

## 2023-05-04 ASSESSMENT — PATIENT HEALTH QUESTIONNAIRE - PHQ9
SUM OF ALL RESPONSES TO PHQ QUESTIONS 1-9: 16
SUM OF ALL RESPONSES TO PHQ QUESTIONS 1-9: 16
10. IF YOU CHECKED OFF ANY PROBLEMS, HOW DIFFICULT HAVE THESE PROBLEMS MADE IT FOR YOU TO DO YOUR WORK, TAKE CARE OF THINGS AT HOME, OR GET ALONG WITH OTHER PEOPLE: VERY DIFFICULT

## 2023-05-05 NOTE — PROGRESS NOTES
[facility]                                     Progress Note    Patient Name: Shiloh Cheatham (Alex)  Date: 23         Service Type: Individual      Session Start Time: 10:00 AM   Session End Time: 10:46 AM      Session Length: 46 Minutes    Session #: 6    Attendees: Client attended alone    Service Modality:  Video Visit:      Provider verified identity through the following two step process.  Patient provided:  Patient  and Patient address    Telemedicine Visit: The patient's condition can be safely assessed and treated via synchronous audio and visual telemedicine encounter.      Reason for Telemedicine Visit: Services only offered telehealth    Originating Site (Patient Location): Patient's home    Distant Site (Provider Location): Provider Remote Setting- Home Office    Consent:  The patient/guardian has verbally consented to: the potential risks and benefits of telemedicine (video visit) versus in person care; bill my insurance or make self-payment for services provided; and responsibility for payment of non-covered services.     Patient would like the video invitation sent by:  My Chart    Mode of Communication:  [facility] Conference via [facility]    Distant Location (Provider):  Off-site    As the provider I attest to compliance with applicable laws and regulations related to telemedicine.    DATA  Interactive Complexity: No  Crisis: No        Progress Since Last Session (Related to Symptoms / Goals / Homework):   Symptoms: Improving Patient reports anxiety has been better, but is not good    Homework: Partially completed      Episode of Care Goals: Minimal progress - PREPARATION (Decided to change - considering how); Intervened by negotiating a change plan and determining options / strategies for behavior change, identifying triggers, exploring social supports, and working towards setting a date to begin behavior change     Current / Ongoing Stressors and Concerns:   Patient  reports their sleep has been better and they report not feeling an intense sense of impending doom of trying to sleep.  Patient reports concerns for continued low energy in regards to completing tasks.  Patient also reports recently visiting their sister and noticing an increase in anxiety in regards to flying.  Patient also reports being prescribed a new medication of Latuda and propranolol from their psychiatrist.  Patient reports noticing their anxiety has been better, but states it is not a good place yet.  Patient continues to report catastrophizing and ruminating their thoughts.     Treatment Objective(s) Addressed in This Session:   identify their fears / thoughts that contribute to feeling anxious  Increase interest, engagement, and pleasure in doing things  Improve quantity and quality of night time sleep / decrease daytime naps  Identify negative self-talk and behaviors: challenge core beliefs, myths, and actions  Improve concentration, focus, and mindfulness in daily activities   Coping strategies for anxiety  Patient also discussed previous traumatic experience     Intervention:   CBT: Provider and patient discussed the connection between their thoughts, feelings, and emotions and its effect on their symptoms of anxiety.  Provider and patient discussed previous trauma and coping strategies to utilize with their anxiety.   Motivational Interviewing    MI Intervention: Expressed Empathy/Understanding, Supported Autonomy, Collaboration, Evocation, Permission to raise concern or advise and Open-ended questions     Change Talk Expressed by the Patient: Desire to change Reasons to change    Provider Response to Change Talk: E - Evoked more info from patient about behavior change, A - Affirmed patient's thoughts, decisions, or attempts at behavior change and R - Reflected patient's change talk      Assessments completed prior to visit:  The following assessments were completed by patient for this visit:  PHQ9:        2/6/2023    11:11 PM 2/15/2023    11:10 AM 3/9/2023    12:44 PM 3/26/2023     1:39 PM 4/11/2023     8:46 AM 4/27/2023     9:09 AM 5/4/2023     9:58 AM   PHQ-9 SCORE   PHQ-9 Total Score MyChart 14 (Moderate depression) 20 (Severe depression) 21 (Severe depression) 12 (Moderate depression)  18 (Moderately severe depression) 16 (Moderately severe depression)   PHQ-9 Total Score 14 20 21    21 12 18 18 16     GAD7:       5/1/2019     2:46 PM 2/1/2023    12:40 PM 2/15/2023    11:32 AM 3/9/2023    12:45 PM 3/26/2023     1:39 PM 4/11/2023     8:46 AM 4/24/2023     7:13 PM   MYNOR-7 SCORE   Total Score  18 (severe anxiety) 15 (severe anxiety) 18 (severe anxiety) 20 (severe anxiety)  20 (severe anxiety)   Total Score 15 18 15 18 20 18 20     PROMIS 10-Global Health (only subscores and total score):       2/15/2023    11:36 AM 3/9/2023    12:46 PM   PROMIS-10 Scores Only   Global Mental Health Score 6 5   Global Physical Health Score 12 12   PROMIS TOTAL - SUBSCORES 18 17         ASSESSMENT: Current Emotional / Mental Status (status of significant symptoms):   Risk status (Self / Other harm or suicidal ideation)   Patient denies current fears or concerns for personal safety.   Patient denies current or recent suicidal ideation or behaviors.   Patient denies current or recent homicidal ideation or behaviors.   Patient denies current or recent self injurious behavior or ideation.   Patient denies other safety concerns.   Patient reports there has been no change in risk factors since their last session.     Patient reports there has been no change in protective factors since their last session.     Recommended that patient call 911 or go to the local ED should there be a change in any of these risk factors.     Appearance:   Appropriate    Eye Contact:   Fair    Psychomotor Behavior: Normal    Attitude:   Cooperative  Pleasant   Orientation:   All   Speech    Rate / Production: Normal     Volume:  Normal     Mood:    Normal   Affect:    Appropriate    Thought Content:  Clear    Thought Form:  Coherent  Logical    Insight:    Good      Medication Review:   Changes to psychiatric medications, see updated Medication List in EPIC.      Medication Compliance:   Yes     Changes in Health Issues:   None reported     Chemical Use Review:   Substance Use: Chemical use reviewed, no active concerns identified      Tobacco Use: No current tobacco use.      Diagnosis:  1. Schizoaffective disorder, depressive type (H)    2. Borderline personality disorder (H)        Collateral Reports Completed:   Not Applicable    PLAN: (Patient Tasks / Therapist Tasks / Other)  Patient was encouraged to utilize worry time and incorporate coping strategies to help manage their symptoms in regards to anxiety.        PATRICIA IRAHETA    This note has been reviewed and I agree with the plan of care. This note is co-signed by CHARLENE Melendrez, [facility], Supervisor, on: May 5, 2023                          ______________________________________________________________________    Individual Treatment Plan    Patient's Name: Shiloh Cheatham  YOB: 2000    Date of Creation: 03/28/23  Date Treatment Plan Last Reviewed/Revised: 03/28/23    DSM5 Diagnoses: 295.70  (F25.1) Schizoaffective Disorder Depressive Type or 301.83 (F60.3) Borderline Personality Disorder  Psychosocial / Contextual Factors: Patient reports a recent increase in symptoms of anxiety.  PROMIS (reviewed every 90 days): 17    Referral / Collaboration:  Referral to another professional/service is not indicated at this time..    Anticipated number of session for this episode of care: 9-12 sessions  Anticipation frequency of session: Weekly  Anticipated Duration of each session: 38-52 minutes  Treatment plan will be reviewed in 90 days or when goals have been changed.       MeasurableTreatment Goal(s) related to diagnosis / functional impairment(s)  Goal 1: Patient will  developed and demonstrate coping skills to deal with mood swings and any impulsive behavior.    Objective #A  Patient will Discussed previous or current posttraumatic stress.  Status: New - Date: 03/28/23     Intervention(s)  Therapist will Work with the client remembering the facts of previous trauma, increasing insights to its effects, and reducing self only any self blame..    Objective #B  Patient will Learn and apply any problem solving skills to complex in daily life..  Status: New - Date: 03/28/23     Intervention(s)  Therapist will teach Problem-solving skills supplied to daily life situations that may cause conflict..      Goal 2: Patient will alleviate depressive symptoms and return to previous effective functioning. As evidenced by a PHQ-9 score of 9 or less.  Objective #A (Patient Action)    Status: New - Date: 03/28/23     Patient will Decrease frequency and intensity of feeling down, depressed, hopeless.    Intervention(s)  Therapist will assign homework to aid in decreasing the frequency and intensity of feeling down, depressed, and hopeless..    Objective #B  Patient will Identify negative self-talk and behaviors: challenge core beliefs, myths, and actions.    Status: New - Date: 03/28/23     Intervention(s)  Therapist will teach the patient skills to identify negative self-talk and behaviors: challenge core beliefs, myths, and actions..    Objective #C  Patient will Increase interest, engagement, and pleasure in doing things.  Status: New - Date: 03/28/23     Intervention(s)  Therapist will assign homework to aid in increasing the patient's interest, enagement, and pleasure in doing things.          Patient has not reviewed nor agreed to the above plan.      APTRICIA IRAHETA  March 28, 2023    This note has been reviewed and I agree with the plan of care. This note is co-signed by CHARLENE Melendrez, [facility], Supervisor, on: April 5, 2023

## 2023-05-07 NOTE — ASSESSMENT & PLAN NOTE
The Abilify was effective however she could not tolerate side effects.  Not currently on anything.  Will start looking titrated to 40 mg twice a day.  Follow-up in 4 weeks.  She is continuing to look for neuropsych testing and autism testing

## 2023-05-11 ENCOUNTER — VIRTUAL VISIT (OUTPATIENT)
Dept: PSYCHOLOGY | Facility: CLINIC | Age: 23
End: 2023-05-11
Payer: COMMERCIAL

## 2023-05-11 DIAGNOSIS — F25.1 SCHIZOAFFECTIVE DISORDER, DEPRESSIVE TYPE (H): ICD-10-CM

## 2023-05-11 DIAGNOSIS — F60.3 BORDERLINE PERSONALITY DISORDER (H): Primary | ICD-10-CM

## 2023-05-11 PROCEDURE — 90834 PSYTX W PT 45 MINUTES: CPT | Mod: VID

## 2023-05-12 NOTE — PROGRESS NOTES
[facility]                                     Progress Note    Patient Name: Shiloh Cheatham (Alex)  Date: 23         Service Type: Individual      Session Start Time: 10:00 AM   Session End Time: 10:52 AM      Session Length: 52 Minutes    Session #: 7    Attendees: Client attended alone    Service Modality:  Video Visit:      Provider verified identity through the following two step process.  Patient provided:  Patient  and Patient address    Telemedicine Visit: The patient's condition can be safely assessed and treated via synchronous audio and visual telemedicine encounter.      Reason for Telemedicine Visit: Services only offered telehealth    Originating Site (Patient Location): Patient's home    Distant Site (Provider Location): Provider Remote Setting- Home Office    Consent:  The patient/guardian has verbally consented to: the potential risks and benefits of telemedicine (video visit) versus in person care; bill my insurance or make self-payment for services provided; and responsibility for payment of non-covered services.     Patient would like the video invitation sent by:  My Chart    Mode of Communication:  Video Conference [facility]    Distant Location (Provider):  Off-site    As the provider I attest to compliance with applicable laws and regulations related to telemedicine.    DATA  Interactive Complexity: No  Crisis: No        Progress Since Last Session (Related to Symptoms / Goals / Homework):   Symptoms: Improving Patient reports anxiety has been better, but is not good    Homework: Partially completed      Episode of Care Goals: Minimal progress - PREPARATION (Decided to change - considering how); Intervened by negotiating a change plan and determining options / strategies for behavior change, identifying triggers, exploring social supports, and working towards setting a date to begin behavior change     Current / Ongoing Stressors and Concerns:   Patient  reports feeling emotionally exhausted within the last week.  They report frustrations and increased stress in regards to difficulties at work.  Patient also reports recognizing their medications have been more effective and have decreased their panic attacks.  Patient also reports noticing they have been feeling less anxious overall.  Lc states they have noticed increase symptoms of depression and PTSD related symptoms.  They report becoming more aware of their negative self talk.     Treatment Objective(s) Addressed in This Session:   identify their fears / thoughts that contribute to feeling anxious  Increase interest, engagement, and pleasure in doing things  Improve quantity and quality of night time sleep / decrease daytime naps  Identify negative self-talk and behaviors: challenge core beliefs, myths, and actions  Improve concentration, focus, and mindfulness in daily activities   Coping strategies for anxiety  Window of tolerance     Intervention:   IFS   Provider and patient discussed the window of tolerance and coping strategies to help return the patient remain calm.    Motivational Interviewing    MI Intervention: Expressed Empathy/Understanding, Supported Autonomy, Collaboration, Evocation, Permission to raise concern or advise and Open-ended questions     Change Talk Expressed by the Patient: Desire to change Reasons to change    Provider Response to Change Talk: E - Evoked more info from patient about behavior change, A - Affirmed patient's thoughts, decisions, or attempts at behavior change and R - Reflected patient's change talk      Assessments completed prior to visit:  The following assessments were completed by patient for this visit:  PHQ9:       2/6/2023    11:11 PM 2/15/2023    11:10 AM 3/9/2023    12:44 PM 3/26/2023     1:39 PM 4/11/2023     8:46 AM 4/27/2023     9:09 AM 5/4/2023     9:58 AM   PHQ-9 SCORE   PHQ-9 Total Score Aidan 14 (Moderate depression) 20 (Severe depression) 21 (Severe  depression) 12 (Moderate depression)  18 (Moderately severe depression) 16 (Moderately severe depression)   PHQ-9 Total Score 14 20 21    21 12 18 18 16     GAD7:       5/1/2019     2:46 PM 2/1/2023    12:40 PM 2/15/2023    11:32 AM 3/9/2023    12:45 PM 3/26/2023     1:39 PM 4/11/2023     8:46 AM 4/24/2023     7:13 PM   MYNOR-7 SCORE   Total Score  18 (severe anxiety) 15 (severe anxiety) 18 (severe anxiety) 20 (severe anxiety)  20 (severe anxiety)   Total Score 15 18 15 18 20 18 20     PROMIS 10-Global Health (only subscores and total score):       2/15/2023    11:36 AM 3/9/2023    12:46 PM   PROMIS-10 Scores Only   Global Mental Health Score 6 5   Global Physical Health Score 12 12   PROMIS TOTAL - SUBSCORES 18 17         ASSESSMENT: Current Emotional / Mental Status (status of significant symptoms):   Risk status (Self / Other harm or suicidal ideation)   Patient denies current fears or concerns for personal safety.   Patient denies current or recent suicidal ideation or behaviors.   Patient denies current or recent homicidal ideation or behaviors.   Patient denies current or recent self injurious behavior or ideation.   Patient denies other safety concerns.   Patient reports there has been no change in risk factors since their last session.     Patient reports there has been no change in protective factors since their last session.     Recommended that patient call 911 or go to the local ED should there be a change in any of these risk factors.     Appearance:   Appropriate    Eye Contact:   Fair    Psychomotor Behavior: Normal    Attitude:   Cooperative  Interested Pleasant   Orientation:   All   Speech    Rate / Production: Normal     Volume:  Normal    Mood:    Normal   Affect:    Appropriate    Thought Content:  Clear    Thought Form:  Coherent  Logical    Insight:    Good      Medication Review:   No changes to current psychiatric medication(s)     Medication Compliance:   Yes     Changes in Health  Issues:   None reported     Chemical Use Review:   Substance Use: Chemical use reviewed, no active concerns identified      Tobacco Use: No current tobacco use.      Diagnosis:  1. Borderline personality disorder (H)    2. Schizoaffective disorder, depressive type (H)        Collateral Reports Completed:   Not Applicable    PLAN: (Patient Tasks / Therapist Tasks / Other)  Patient was encouraged to incorporate somatic resources and oriented to help maintain being in the window of tolerance.        JAC QUINTANILLA SKY    This note has been reviewed and I agree with the plan of care. This note is co-signed by CHARLENE Melendrez, [facility], Supervisor, on: May 12, 2023                   ______________________________________________________________________    Individual Treatment Plan    Patient's Name: Shiloh Cheatham  YOB: 2000    Date of Creation: 03/28/23  Date Treatment Plan Last Reviewed/Revised: 03/28/23    DSM5 Diagnoses: 295.70  (F25.1) Schizoaffective Disorder Depressive Type or 301.83 (F60.3) Borderline Personality Disorder  Psychosocial / Contextual Factors: Patient reports a recent increase in symptoms of anxiety.  PROMIS (reviewed every 90 days): 17    Referral / Collaboration:  Referral to another professional/service is not indicated at this time..    Anticipated number of session for this episode of care: 9-12 sessions  Anticipation frequency of session: Weekly  Anticipated Duration of each session: 38-52 minutes  Treatment plan will be reviewed in 90 days or when goals have been changed.       MeasurableTreatment Goal(s) related to diagnosis / functional impairment(s)  Goal 1: Patient will developed and demonstrate coping skills to deal with mood swings and any impulsive behavior.    Objective #A  Patient will Discussed previous or current posttraumatic stress.  Status: New - Date: 03/28/23     Intervention(s)  Therapist will Work with the client remembering the facts of previous trauma,  increasing insights to its effects, and reducing self only any self blame..    Objective #B  Patient will Learn and apply any problem solving skills to complex in daily life..  Status: New - Date: 03/28/23     Intervention(s)  Therapist will teach Problem-solving skills supplied to daily life situations that may cause conflict..      Goal 2: Patient will alleviate depressive symptoms and return to previous effective functioning. As evidenced by a PHQ-9 score of 9 or less.  Objective #A (Patient Action)    Status: New - Date: 03/28/23     Patient will Decrease frequency and intensity of feeling down, depressed, hopeless.    Intervention(s)  Therapist will assign homework to aid in decreasing the frequency and intensity of feeling down, depressed, and hopeless..    Objective #B  Patient will Identify negative self-talk and behaviors: challenge core beliefs, myths, and actions.    Status: New - Date: 03/28/23     Intervention(s)  Therapist will teach the patient skills to identify negative self-talk and behaviors: challenge core beliefs, myths, and actions..    Objective #C  Patient will Increase interest, engagement, and pleasure in doing things.  Status: New - Date: 03/28/23     Intervention(s)  Therapist will assign homework to aid in increasing the patient's interest, enagement, and pleasure in doing things.          Patient has not reviewed nor agreed to the above plan.      PATRICIA IRAHETA  March 28, 2023    This note has been reviewed and I agree with the plan of care. This note is co-signed by CHARLENE Melendrez, [facility], Supervisor, on: April 5, 2023

## 2023-05-18 ENCOUNTER — TELEPHONE (OUTPATIENT)
Dept: FAMILY MEDICINE | Facility: CLINIC | Age: 23
End: 2023-05-18
Payer: COMMERCIAL

## 2023-05-18 ENCOUNTER — MYC MEDICAL ADVICE (OUTPATIENT)
Dept: FAMILY MEDICINE | Facility: CLINIC | Age: 23
End: 2023-05-18
Payer: COMMERCIAL

## 2023-05-18 NOTE — TELEPHONE ENCOUNTER
I contacted Lc to discuss symptoms of 'wheezing' and request for Albuterol refill.  Lc reports that they are not currently wheezing, shortness of breath or cough.  They experience intermittent wheezing and use albuterol inhaler approx every other day.   Has lost their inhaler and requesting a refill.   They denies previous diagnosis of Asthma but reports a family history of asthma ( sisters, grandmother, mother).  I advised Lc to be seen if there is persistent wheeze or shortness of breath. I also recommended a more thorough evaluation and treatment plan and to develop and action plan if indicated. Lc agrees with michelle Peterson (Lori) CNP  [facility]  Certificate in Travel Health

## 2023-05-19 ENCOUNTER — MYC MEDICAL ADVICE (OUTPATIENT)
Dept: PSYCHIATRY | Facility: CLINIC | Age: 23
End: 2023-05-19
Payer: COMMERCIAL

## 2023-05-19 DIAGNOSIS — F25.1 SCHIZOAFFECTIVE DISORDER, DEPRESSIVE TYPE (H): Primary | ICD-10-CM

## 2023-05-22 NOTE — TELEPHONE ENCOUNTER
Patient called and said the Latuda is also causing akathisia like the Abilify. Wondering what next steps are.          The Abilify was effective however she could not tolerate side effects.  Not currently on anything.  Will start looking titrated to 40 mg twice a day.  Follow-up in 4 weeks.  She is continuing to look for neuropsych testing and autism testing            1. Schizoaffective disorder, depressive type (H) - Primary 3/10/2023       Relevant Medications       lurasidone (LATUDA) 40 MG TABS tablet     2. Borderline personality disorder (H) 3/10/2023       Relevant Medications       propranolol (INDERAL) 20 MG tablet     Sol Dunlap RN on 5/22/2023 at 12:41 PM

## 2023-05-23 NOTE — TELEPHONE ENCOUNTER
Call to patient.  Discontinue the Latuda due to akathisia.  Primary symptoms are hallucinations and mood lability.  Start Fanapt as follows:    1 mg twice a day day 1, day to 2 mg twice a day, day 3 4 mg twice a day, day for 6 mg twice a day, day 5 8 mg twice a day, day 610 mg twice a day then increase to 12 mg twice a day on day 7 and continue this dose    Carolin Larkin, DEVAN, APRN, FMHNP-BC,

## 2023-05-25 ENCOUNTER — TELEPHONE (OUTPATIENT)
Dept: BEHAVIORAL HEALTH | Facility: CLINIC | Age: 23
End: 2023-05-25
Payer: COMMERCIAL

## 2023-05-25 DIAGNOSIS — F25.1 SCHIZOAFFECTIVE DISORDER, DEPRESSIVE TYPE (H): Primary | ICD-10-CM

## 2023-05-25 NOTE — TELEPHONE ENCOUNTER
Reason for call:  Other   Patient called regarding (reason for call): call back  Additional comments: Phong, pharmacist from [facility] in [place], called to clarify the directions on the Iloperidone prescriptions.    Phone number to reach Phong:  [phone]    Best Time:  ASAP    Can we leave a detailed message on this number?  Not Applicable    Travel screening: Not Applicable

## 2023-06-01 NOTE — PROGRESS NOTES
"[facility]  June 2, 2023      Behavioral Health Clinician Progress Note    Patient Name: Shiloh Cheatham \"Lc\" (they/them)          Service Type:  Individual      Service Location:   [facility] / Email (patient reached)     Session Start Time: 730am  Session End Time: 746am      Session Length: 16 - 37      Attendees: Patient     Service Modality:  Video Visit:      Provider verified identity through the following two step process.  Patient provided:  Patient photo and Patient is known previously to provider    Telemedicine Visit: The patient's condition can be safely assessed and treated via synchronous audio and visual telemedicine encounter.      Reason for Telemedicine Visit: Services only offered telehealth    Originating Site (Patient Location): Patient's home    Distant Site (Provider Location): Provider Remote Setting- Home Office    Consent:  The patient/guardian has verbally consented to: the potential risks and benefits of telemedicine (video visit) versus in person care; bill my insurance or make self-payment for services provided; and responsibility for payment of non-covered services.     Patient would like the video invitation sent by:  My Chart    Mode of Communication:  Video Conference via [facility]    Distant Location (Provider):  Off-site    As the provider I attest to compliance with applicable laws and regulations related to telemedicine.    Visit Activities (Refresh list every visit): [facility] Only    Diagnostic Assessment Date: 3/10/2023  Treatment Plan Review Date: 7/27/2023  See Flowsheets for today's PHQ-9 and MYNOR-7 results  Previous PHQ-9:       4/11/2023     8:46 AM 4/27/2023     9:09 AM 5/4/2023     9:58 AM   PHQ-9 SCORE   PHQ-9 Total Score [facility]  18 (Moderately severe depression) 16 (Moderately severe depression)   PHQ-9 Total Score 18 18 16     Previous MYNOR-7:       4/11/2023     8:46 AM 4/24/2023     7:13 PM 6/2/2023     3:08 AM   MYNOR-7 " SCORE   Total Score  20 (severe anxiety) 11 (moderate anxiety)   Total Score 18 20 11    11       AMANDA LEVEL:       View : No data to display.                DATA  Extended Session (60+ minutes): No  Interactive Complexity: No  Crisis: No  [facility] Patient: No    Treatment Objective(s) Addressed in This Session:  Target Behavior(s):  improve mood stability    Depressed Mood: Decrease frequency and intensity of feeling down, depressed, hopeless    Current Stressors / Issues:   update: Pt reports that propanolol is working well, much less anxious. Stopped latuda due to getting akathisia, mood has been unstable, more depressed, Denies hallucinations. Their  ADHD testing got cancelled and is rescheduled for August, frustrated. [facility] explored where attention is most inhibiting their ability to function, pervasive, lack of focus, task initiation/completion, rejection sensitivity. [facility] explored strategies they have developed to manage their work life. Ongoing trauma symptoms which are triggered by their living environment so [place] and goes to the apartment in [place] to check mail. When their lease is up they and partner will [place]    Stresses: work due to lack of attention  Appetite: unremarkable  Sleep: trouble staying asleep, doesn't feel she's getting deep sleep  Therapy: Mary Verduzco at [facility] but is looking for someone with more trauma focus care, reports that she has a couple of appointments lined up; ADHD testing scheduled in Aug.  RANDAL: No  Preg: No  Goals/progress on goals: ongoing mood instability, attention issues  Most important: latuda didn't work, she's waiting for the new med to be back in stock in her pharmacy      Progress on Treatment Objective(s) / Homework:  No improvement - ACTION (Actively working towards change); Intervened by reinforcing change plan / affirming steps taken    Motivational Interviewing    MI Intervention: Co-Developed  Goal: improve mood stability, Expressed Empathy/Understanding, Open-ended questions, Reflections: simple and complex, Change talk (evoked) and Reframe     Change Talk Expressed by the Patient: Desire to change Ability to change Reasons to change Need to change Committment to change Activation Taking steps    Provider Response to Change Talk: E - Evoked more info from patient about behavior change, A - Affirmed patient's thoughts, decisions, or attempts at behavior change, R - Reflected patient's change talk and S - Summarized patient's change talk statements      Care Plan review completed: Yes    Medication Review:  No changes to current psychiatric medication(s)    Medication Compliance:  Yes    Changes in Health Issues:   None reported    Chemical Use Review:   Substance Use: Chemical use reviewed, no active concerns identified      Tobacco Use: No current tobacco use.      Assessment: Current Emotional / Mental Status (status of significant symptoms):  Risk status (Self / Other harm or suicidal ideation)  Patient denies a history of suicidal ideation, suicide attempts, self-injurious behavior, homicidal ideation, homicidal behavior and and other safety concerns  Patient denies current fears or concerns for personal safety.  Patient denies current or recent suicidal ideation or behaviors.  Patient denies current or recent homicidal ideation or behaviors.  Patient denies current or recent self injurious behavior or ideation.  Patient denies other safety concerns.  A safety and risk management plan has not been developed at this time, however patient was encouraged to call [place] [phone] should there be a change in any of these risk factors.    Appearance:   Appropriate   Eye Contact:   Good   Psychomotor Behavior: Normal   Attitude:   Cooperative   Orientation:   All  Speech   Rate / Production: Normal    Volume:  Normal   Mood:    Anxious  Irritable   Affect:    Congruent   Thought Content:  Clear   Thought  Form:  Coherent  Logical   Insight:    Good     Diagnoses:  1. Depression, major, recurrent, moderate (H)        Collateral Reports Completed:  Collaborated with [facility] team    Plan: (Homework, other):  Patient was given information about behavioral services and encouraged to schedule a follow up appointment with the clinic [facility] in 1 month.  Lc Cheatham was also given information about mental health symptoms and treatment options .  CD Recommendations: No indications of CD issues. Marimar Webb [facility]         ______________________________________________________________________    Integrated Primary Care Behavioral Health Treatment Plan    Patient's Name: Shiloh Cheatham  YOB: 2000    Date of Creation: 4/27/2023  Date Treatment Plan Last Reviewed/Revised: pending    DSM5 Diagnoses: 296.32 (F33.1) Major Depressive Disorder, Recurrent Episode, Moderate _ and With anxious distress  Psychosocial / Contextual Factors: hx of trauma, TBI  PROMIS (reviewed every 90 days): 3/10/2023 (17)    Referral / Collaboration:  Collaborated with [facility] team.    Anticipated number of session for this episode of care: 10-12  Anticipation frequency of session: Monthly  Anticipated Duration of each session: 16-37 minutes  Treatment plan will be reviewed in 90 days or when goals have been changed.       MeasurableTreatment Goal(s) related to diagnosis / functional impairment(s)  Goal 1: Patient will experience improved mood    I will know I've met my goal when more manageable anxiety, panic attacks going away, mood stability, alleviated depression.      Objective #A (Patient Action)    Patient will Decrease frequency and intensity of feeling down, depressed, hopeless.  Status: New - Date: 4/27/2023      Intervention(s)  Therapist will teach emotional regulation skills. to manage depression .      Patient has reviewed and agreed to the above plan.      Marimar Webb, [facility]  June 2, 2023

## 2023-06-02 ENCOUNTER — VIRTUAL VISIT (OUTPATIENT)
Dept: BEHAVIORAL HEALTH | Facility: CLINIC | Age: 23
End: 2023-06-02
Payer: COMMERCIAL

## 2023-06-02 ENCOUNTER — VIRTUAL VISIT (OUTPATIENT)
Dept: PSYCHIATRY | Facility: CLINIC | Age: 23
End: 2023-06-02
Payer: COMMERCIAL

## 2023-06-02 DIAGNOSIS — F33.1 DEPRESSION, MAJOR, RECURRENT, MODERATE (H): Primary | ICD-10-CM

## 2023-06-02 DIAGNOSIS — F60.3 BORDERLINE PERSONALITY DISORDER (H): Primary | ICD-10-CM

## 2023-06-02 PROCEDURE — 99214 OFFICE O/P EST MOD 30 MIN: CPT | Mod: VID | Performed by: NURSE PRACTITIONER

## 2023-06-02 PROCEDURE — 90832 PSYTX W PT 30 MINUTES: CPT | Mod: VID | Performed by: SOCIAL WORKER

## 2023-06-02 RX ORDER — PROPRANOLOL HYDROCHLORIDE 80 MG/1
80 CAPSULE, EXTENDED RELEASE ORAL DAILY
Qty: 30 CAPSULE | Refills: 1 | Status: SHIPPED | OUTPATIENT
Start: 2023-06-02 | End: 2023-06-08 | Stop reason: SINTOL

## 2023-06-02 ASSESSMENT — ANXIETY QUESTIONNAIRES
6. BECOMING EASILY ANNOYED OR IRRITABLE: MORE THAN HALF THE DAYS
5. BEING SO RESTLESS THAT IT IS HARD TO SIT STILL: SEVERAL DAYS
2. NOT BEING ABLE TO STOP OR CONTROL WORRYING: MORE THAN HALF THE DAYS
2. NOT BEING ABLE TO STOP OR CONTROL WORRYING: MORE THAN HALF THE DAYS
GAD7 TOTAL SCORE: 11
4. TROUBLE RELAXING: MORE THAN HALF THE DAYS
GAD7 TOTAL SCORE: 11
6. BECOMING EASILY ANNOYED OR IRRITABLE: MORE THAN HALF THE DAYS
7. FEELING AFRAID AS IF SOMETHING AWFUL MIGHT HAPPEN: NOT AT ALL
8. IF YOU CHECKED OFF ANY PROBLEMS, HOW DIFFICULT HAVE THESE MADE IT FOR YOU TO DO YOUR WORK, TAKE CARE OF THINGS AT HOME, OR GET ALONG WITH OTHER PEOPLE?: SOMEWHAT DIFFICULT
3. WORRYING TOO MUCH ABOUT DIFFERENT THINGS: MORE THAN HALF THE DAYS
IF YOU CHECKED OFF ANY PROBLEMS ON THIS QUESTIONNAIRE, HOW DIFFICULT HAVE THESE PROBLEMS MADE IT FOR YOU TO DO YOUR WORK, TAKE CARE OF THINGS AT HOME, OR GET ALONG WITH OTHER PEOPLE: SOMEWHAT DIFFICULT
IF YOU CHECKED OFF ANY PROBLEMS ON THIS QUESTIONNAIRE, HOW DIFFICULT HAVE THESE PROBLEMS MADE IT FOR YOU TO DO YOUR WORK, TAKE CARE OF THINGS AT HOME, OR GET ALONG WITH OTHER PEOPLE: SOMEWHAT DIFFICULT
7. FEELING AFRAID AS IF SOMETHING AWFUL MIGHT HAPPEN: NOT AT ALL
7. FEELING AFRAID AS IF SOMETHING AWFUL MIGHT HAPPEN: NOT AT ALL
GAD7 TOTAL SCORE: 11
GAD7 TOTAL SCORE: 11
7. FEELING AFRAID AS IF SOMETHING AWFUL MIGHT HAPPEN: NOT AT ALL
1. FEELING NERVOUS, ANXIOUS, OR ON EDGE: MORE THAN HALF THE DAYS
4. TROUBLE RELAXING: MORE THAN HALF THE DAYS
3. WORRYING TOO MUCH ABOUT DIFFERENT THINGS: MORE THAN HALF THE DAYS
5. BEING SO RESTLESS THAT IT IS HARD TO SIT STILL: SEVERAL DAYS
8. IF YOU CHECKED OFF ANY PROBLEMS, HOW DIFFICULT HAVE THESE MADE IT FOR YOU TO DO YOUR WORK, TAKE CARE OF THINGS AT HOME, OR GET ALONG WITH OTHER PEOPLE?: SOMEWHAT DIFFICULT
1. FEELING NERVOUS, ANXIOUS, OR ON EDGE: MORE THAN HALF THE DAYS

## 2023-06-02 NOTE — NURSING NOTE
Is the patient currently in the state of [place]? YES    Visit mode:VIDEO    If the visit is dropped, the patient can be reconnected by: VIDEO VISIT: Text to cell phone: [phone]    Will anyone else be joining the visit? NO      How would you like to obtain your AVS? MyChart    Are changes needed to the allergy or medication list? NO    Reason for visit: RECHECK      Care team has reviewed attendance agreement with patient. Patient advised that two failed appointments within 6 months may lead to termination of current episode of care.
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
## 2023-06-02 NOTE — PATIENT INSTRUCTIONS
**For crisis resources, please see the information at the end of this document**     Thank you for coming to the [facility] [place] [facility].    TREATMENT PLAN:    MEDICATIONS:   - change the propranolol XR .  Continue plan to start Fanapt titration    -PSYCHOEDUCATION: Risks of antipsychotic medications:  metabolic disorder and movement disorder, and the need for blood monitoring of sugar and lipids/cholesterol while taking the medication.       CONSULTS/REFERRALS:   Continue therapy     LABS/PROCEDURES:    Please call your [place] clinic and ask for a lab only appointment at your earliest convenience.  If your results are reassuring or normal they will be mailed to you or sent through [employer] within 7 days. If the lab tests need quick action we will call you with the results. The phone number we will call with results is # [phone].      FOLLOW UP: Schedule an appointment with me in two months or sooner as needed.  The intake team should be calling you to schedule.  If you dont hear from them, or they were unable to reach you, please call [phone] to schedule.  Follow up with primary care provider as planned or for acute medical concerns.  Call the psychiatric nurse line with medication questions or concerns at [phone].      Medication Refills:  If you need any refills please call your pharmacy and they will contact us. Our fax number for refills is [phone]. Please allow three business for refill processing. If you need to  your refill at a new pharmacy, please contact the new pharmacy directly. The new pharmacy will help you get your medications transferred.     [employer] Assistance [phone]  Financial Assistance [phone]  [facility] Billing [phone]  Central Billing Office, [facility]: [phone]  [place] Billing [phone]  Medical Records [phone]  [facility] Patient Bill of Rights  [email or web address]       MENTAL HEALTH CRISIS RESOURCES:  For a emergency help, please call 911 or go to the nearest Emergency Department.     Emergency Walk-In Options:   EmPATH Unit @ [facility] ([place]): [phone] - Specialized mental health emergency area designed to be calming  [facility] ([place]): [phone]  [facility] ([place]): [phone]  [facility]): [phone]    National Crisis Information: Call 988 Suicide and Crisis Lifeline  Crisis Text Line (free 24/7):  call **CRISIS (**606573) Crisis or use the texting option by texting 412621.   National Suicide Prevention Lifeline: Call 988  Poison Control Center: 9-646-389-1598  Trans Lifeline: 2-395-452-3379 - Hotline for transgender people of all ages  The [employer]: 2-881-912-5695 - Hotline for LGBT youth   List of all [facility] resources:   https://[place].gov/dhs/people-we-serve/adults/health-care/mental-health/resources/crisis-contacts.jsp    For Non-Emergency Support:   Fast Tracker: Mental Health & Substance Use Disorder Resources -   https://www.fasttrackermn.org/       Again thank you for choosing [facility] and please let us know how we can best partner with you to improve you and your family's health.    You may be receiving a survey regarding this appointment. We would love to have your feedback, both positive and negative. The survey is done by an external company, so your answers are anonymous.

## 2023-06-03 ENCOUNTER — HEALTH MAINTENANCE LETTER (OUTPATIENT)
Age: 23
End: 2023-06-03

## 2023-06-11 NOTE — ASSESSMENT & PLAN NOTE
Did not tolerate the Latuda due to akathisia and plan was to start Fanapt titrated to twelve mg.  She has not picked up yet.  Call to pharmacy and it is ready for them to .  Will continue with this plan and follow-up in six weeks.  Propranolol is effective.  Will transition to once a day dosing, propranolol XR 80 mg.

## 2023-07-11 ENCOUNTER — OFFICE VISIT (OUTPATIENT)
Dept: FAMILY MEDICINE | Facility: CLINIC | Age: 23
End: 2023-07-11
Payer: COMMERCIAL

## 2023-07-11 DIAGNOSIS — F60.3 BORDERLINE PERSONALITY DISORDER (H): ICD-10-CM

## 2023-07-11 DIAGNOSIS — F64.0 TRANSGENDER PERSON ON HORMONE THERAPY: Primary | ICD-10-CM

## 2023-07-11 DIAGNOSIS — F43.10 PTSD (POST-TRAUMATIC STRESS DISORDER): ICD-10-CM

## 2023-07-11 DIAGNOSIS — Z79.899 TRANSGENDER PERSON ON HORMONE THERAPY: Primary | ICD-10-CM

## 2023-07-11 PROCEDURE — 99214 OFFICE O/P EST MOD 30 MIN: CPT | Performed by: FAMILY MEDICINE

## 2023-07-11 NOTE — PROGRESS NOTES
Sarah Platt is a 23 year old, presenting for the following health issues:  No chief complaint on file.         No data to display              HPI   Here to follow up on mental health in preparation for GAHT with testosterone    They are working with psychiatry on medications, had significant side effects from 2 different medications, now taking a break to clear system. Still on propranol for anxiety which is helpful  Next psychiatry appointment . Aug 1st or so  Neuropsychiatry  testing planned in late July/early August to evaluatie  Symptoms/diagnosis: postconcussion, ADHD, autism  Was doing weekly therapy individual, in process of finding better fit therapist  Looking into somatic therapy   Reviewed last psychiatry note 6/1, last psychotoloegy note  5/11  No halllucinations  No SIB  No sucide attempts      Hormone Goals have adjusted over time; would be fine with keeping lower doses over extended period of time and seeing if that meets gender needs before going up on dose  What has given most dysphoria has changed since  going on and off psych meds, would like to see what settles out before going up on dose of testosterone  when back on trial of psych meds    Plan: Do neuropsych testing prior to Testosterone,  start to get unmedicated baseline  Then plan start low dose topical testosterone     No new health concerns    3/203 labs: Lipids normal, HgbA1c normal, cmp normal      PE  Alert, NAD  Speech RRR, mood is euthymic, no psychomotor changes, thought process is appropriate,   Affect is normal. No evidence of suicidality.       A/P  1. Gender dysphoria  2. ADHD  3. PTSD  4 BPD  Reviewed options for addressing start of GAHT. Plan to do neuropsychiatric testing first, then start Androgel 20.25 mg topically daily; patient instructed in use and transfer precautions.  Continue with psychiatry and psychology teams    Follow-up 2 month after hormone start

## 2023-07-21 RX ORDER — TESTOSTERONE 1.62 MG/G
1 GEL TRANSDERMAL DAILY
Qty: 150 G | Refills: 0 | Status: SHIPPED | OUTPATIENT
Start: 2023-07-21 | End: 2023-10-12

## 2023-07-27 ENCOUNTER — TELEPHONE (OUTPATIENT)
Dept: FAMILY MEDICINE | Facility: CLINIC | Age: 23
End: 2023-07-27
Payer: COMMERCIAL

## 2023-07-27 VITALS
DIASTOLIC BLOOD PRESSURE: 79 MMHG | SYSTOLIC BLOOD PRESSURE: 113 MMHG | HEART RATE: 66 BPM | BODY MASS INDEX: 34.52 KG/M2 | WEIGHT: 220.4 LBS

## 2023-07-27 NOTE — TELEPHONE ENCOUNTER
Approved: Testosterone 1.62% 20.25 MG/ACT    6/27/2023 - 7/26/2024    C# [id number]      Rebekah Eid CMA    Pharmacy notified

## 2023-08-03 ASSESSMENT — PATIENT HEALTH QUESTIONNAIRE - PHQ9
SUM OF ALL RESPONSES TO PHQ QUESTIONS 1-9: 26
SUM OF ALL RESPONSES TO PHQ QUESTIONS 1-9: 26
10. IF YOU CHECKED OFF ANY PROBLEMS, HOW DIFFICULT HAVE THESE PROBLEMS MADE IT FOR YOU TO DO YOUR WORK, TAKE CARE OF THINGS AT HOME, OR GET ALONG WITH OTHER PEOPLE: EXTREMELY DIFFICULT

## 2023-08-03 NOTE — PROGRESS NOTES
"[facility]  Aug 4, 2023      Behavioral Health Clinician Progress Note    Patient Name: Shiloh Cheatham \"Lc\" (they/them)          Service Type:  Individual      Service Location:   MyChart / Email (patient reached)     Session Start Time: 730am  Session End Time: 746am      Session Length: 16 - 37      Attendees: Patient     Service Modality:  Video Visit:      Provider verified identity through the following two step process.  Patient provided:  Patient photo and Patient is known previously to provider    Telemedicine Visit: The patient's condition can be safely assessed and treated via synchronous audio and visual telemedicine encounter.      Reason for Telemedicine Visit: Services only offered telehealth    Originating Site (Patient Location): Patient's home    Distant Site (Provider Location): [facility] MENT[facility] & [facility]DI[facility]    Consent:  The patient/guardian has verbally consented to: the potential risks and benefits of telemedicine (video visit) versus in person care; bill my insurance or make self-payment for services provided; and responsibility for payment of non-covered services.     Patient would like the video invitation sent by:  My Chart    Mode of Communication:  Video Conference via [facility]    Distant Location (Provider):  On-site    As the provider I attest to compliance with applicable laws and regulations related to telemedicine.    Visit Activities (Refresh list every visit): [facility] Only    Diagnostic Assessment Date: 3/10/2023  Treatment Plan Review Date: 10/27/2023  See Flowsheets for today's PHQ-9 and MYNOR-7 results  Previous PHQ-9:       4/27/2023     9:09 AM 5/4/2023     9:58 AM 8/3/2023     6:19 PM   PHQ-9 SCORE   [facility]9 [facility] (Moderately severe depression) 16 (Moderately severe depression) 26 (Severe depression)   PHQ-9 Total Score 18 16 26     Previous MYNOR-7:       4/11/2023     8:46 AM 4/24/2023    " " 7:13 PM 6/2/2023     3:08 AM   MYNOR-7 SCORE   Total Score  20 (severe anxiety) 11 (moderate anxiety)   Total Score 18 20 11    11       AMANDA LEVEL:       No data to display                DATA  Extended Session (60+ minutes): No  Interactive Complexity: No  Crisis: No  [facility] Patient: No    Treatment Objective(s) Addressed in This Session:  Target Behavior(s):  improve mood stability    Depressed Mood: Decrease frequency and intensity of feeling down, depressed, hopeless    Current Stressors / Issues:   update: Pt reports that her life has been \"crazy, hectic, not paying attention to my mental health\". Increasing depression (decreased appetite, irritabiltiy, sleeping during the day but not as much at night, low energy, low motivation) and anxiety no paranoia. Pt reports that they are having to use a lot of energy to use skills or do basic functions like eating properly, seeing family and friends etc. Pt attributes this to being out of survival mode and trying to deal with the stress and emotional fallout from the last couple of years. [facility] validated. [facility]. They have been prescribed testerone but wanted to discuss the possible effects on medication. [facility] validated and shared with pt potential positive impacts on mental health related to affirming gender identity care.  Pt denies SI but has been having thoughts of self-harm. They report that they haven't engaged in any behaviors. Pt reports that they have rules around self-harm (not engaging in in activities that will leave permanent marks e.g), concerns about how their SIB would hurt those she cares about. They report that their relationship is going well. Partner is supportive of gender transition.   Stresses: no  Appetite: unremarkable  Sleep: sleeping less at night and more during the day  Therapy: ADHD testing scheduled for Feb. 2024, she has an appointment at [facility] for trauma informd therapy in Oct or Nov  RANDAL: No  Preg: " NA  Goals/progress on goals:  Most important: how will testerone will effect meds  Recommended follow up appointments:  Provider and [facility] but no augmented service with [facility]       Progress on Treatment Objective(s) / Homework:  No improvement - ACTION (Actively working towards change); Intervened by reinforcing change plan / affirming steps taken    Motivational Interviewing    MI Intervention: Co-Developed Goal: improve mood stability, Expressed Empathy/Understanding, Open-ended questions, Reflections: simple and complex, Change talk (evoked) and Reframe     Change Talk Expressed by the Patient: Desire to change Ability to change Reasons to change Need to change Committment to change Activation Taking steps    Provider Response to Change Talk: E - Evoked more info from patient about behavior change, A - Affirmed patient's thoughts, decisions, or attempts at behavior change, R - Reflected patient's change talk and S - Summarized patient's change talk statements      Care Plan review completed: Yes    Medication Review:  No changes to current psychiatric medication(s)    Medication Compliance:  Yes    Changes in Health Issues:   None reported    Chemical Use Review:   Substance Use: Chemical use reviewed, no active concerns identified      Tobacco Use: No current tobacco use.      Assessment: Current Emotional / Mental Status (status of significant symptoms):  Risk status (Self / Other harm or suicidal ideation)  Patient denies a history of suicidal ideation, suicide attempts, self-injurious behavior, homicidal ideation, homicidal behavior and and other safety concerns  Patient denies current fears or concerns for personal safety.  Patient denies current or recent suicidal ideation or behaviors.  Patient denies current or recent homicidal ideation or behaviors.  Patient denies current or recent self injurious behavior or ideation.  Patient denies other safety concerns.  A safety and risk management plan has not been  developed at this time, however patient was encouraged to call [place] [phone] should there be a change in any of these risk factors.    Appearance:   Appropriate   Eye Contact:   Good   Psychomotor Behavior: Normal   Attitude:   Cooperative   Orientation:   All  Speech   Rate / Production: Normal    Volume:  Normal   Mood:    Depressed   Affect:    Congruent   Thought Content:  Clear   Thought Form:  Coherent  Logical   Insight:    Good     Diagnoses:  1. Severe episode of recurrent major depressive disorder, without psychotic features (H)          Collateral Reports Completed:  Collaborated with [facility] team    Plan: (Homework, other):  Patient was given information about behavioral services and encouraged to schedule a follow up appointment with the clinic [facility] in 1 month.  Lc Cheatham was also given information about mental health symptoms and treatment options .  CD Recommendations: No indications of CD issues. Marimar Webb [facility]         ______________________________________________________________________    Integrated Primary Care Behavioral Health Treatment Plan    Patient's Name: Shiloh Cheatham  YOB: 2000    Date of Creation: 4/27/2023  Date Treatment Plan Last Reviewed/Revised: pending    DSM5 Diagnoses: 296.32 (F33.1) Major Depressive Disorder, Recurrent Episode, Moderate _ and With anxious distress  Psychosocial / Contextual Factors: hx of trauma, TBI  PROMIS (reviewed every 90 days): 3/10/2023 (17)    Referral / Collaboration:  Collaborated with [facility] team.    Anticipated number of session for this episode of care: 10-12  Anticipation frequency of session: Monthly  Anticipated Duration of each session: 16-37 minutes  Treatment plan will be reviewed in 90 days or when goals have been changed.       MeasurableTreatment Goal(s) related to diagnosis / functional impairment(s)  Goal 1: Patient will experience improved mood    I will know I've met my goal when more manageable  anxiety, panic attacks going away, mood stability, alleviated depression.      Objective #A (Patient Action)    Patient will Decrease frequency and intensity of feeling down, depressed, hopeless.  Status: New - Date: 10/27/2023      Intervention(s)  Therapist will teach emotional regulation skills. to manage depression .      Patient has reviewed and agreed to the above plan.      Marimar Webb, [facility]  Aug 4, 2023

## 2023-08-04 ENCOUNTER — VIRTUAL VISIT (OUTPATIENT)
Dept: BEHAVIORAL HEALTH | Facility: CLINIC | Age: 23
End: 2023-08-04
Payer: COMMERCIAL

## 2023-08-04 ENCOUNTER — VIRTUAL VISIT (OUTPATIENT)
Dept: PSYCHIATRY | Facility: CLINIC | Age: 23
End: 2023-08-04
Payer: COMMERCIAL

## 2023-08-04 DIAGNOSIS — F33.2 SEVERE EPISODE OF RECURRENT MAJOR DEPRESSIVE DISORDER, WITHOUT PSYCHOTIC FEATURES (H): Primary | ICD-10-CM

## 2023-08-04 DIAGNOSIS — F60.3 BORDERLINE PERSONALITY DISORDER (H): Primary | ICD-10-CM

## 2023-08-04 DIAGNOSIS — F25.1 SCHIZOAFFECTIVE DISORDER, DEPRESSIVE TYPE (H): ICD-10-CM

## 2023-08-04 DIAGNOSIS — Z87.820 HX OF TRAUMATIC BRAIN INJURY: ICD-10-CM

## 2023-08-04 DIAGNOSIS — G31.84 MILD NEUROCOGNITIVE DISORDER: ICD-10-CM

## 2023-08-04 PROCEDURE — 90832 PSYTX W PT 30 MINUTES: CPT | Mod: 95 | Performed by: SOCIAL WORKER

## 2023-08-04 PROCEDURE — 99214 OFFICE O/P EST MOD 30 MIN: CPT | Mod: VID | Performed by: NURSE PRACTITIONER

## 2023-08-04 RX ORDER — PROPRANOLOL HYDROCHLORIDE 20 MG/1
20 TABLET ORAL 2 TIMES DAILY
Qty: 60 TABLET | Refills: 1 | Status: SHIPPED | OUTPATIENT
Start: 2023-08-04 | End: 2023-11-15

## 2023-08-04 NOTE — PATIENT INSTRUCTIONS
**For crisis resources, please see the information at the end of this document**     Thank you for coming to the [facility] MENT[facility] HEALTH & [facility]DI[facility] [place] [facility].    TREATMENT PLAN:    MEDICATIONS:   - start sertraline titrated to 50 mg, continue propranolol     -PSYCHOEDUCATION: Risks and benefits of medication reviewed today. The Black Box Warning for use of SSRIs was reviewed. Discussed how antidepressants may increase the risk of suicidal thinking and behavior in some children and adolescents with Major Depressive Disorder. Suggested that children and adolescents taking SSRI medications should be closely monitored for any worsening in depression, emergence of suicidal thinking or behavior, or unusual changes in behavior, such as sleeplessness, agitation, or withdrawal from normal social situations.    All serotonin specific reuptake inhibitor medications have a Black Box Warning for use by anyone under the age of 24 years old. Antidepressants may increase the risk of suicidal thinking and behavior in some children and adolescents with Major Depressive Disorder. Children and adolescents taking SSRI medications should be closely monitored for any worsening in depression, emergence of suicidal thinking or behavior, or unusual changes in behavior, such as sleeplessness, agitation, or withdrawal from normal social situations.      CONSULTS/REFERRALS:   Continue therapy     LABS/PROCEDURES:    Please call your [place] clinic and ask for a lab only appointment at your earliest convenience.  If your results are reassuring or normal they will be mailed to you or sent through [employer] within 7 days. If the lab tests need quick action we will call you with the results. The phone number we will call with results is # [phone].      FOLLOW UP: Schedule an appointment with me in six weeks  or sooner as needed.  The intake team should be calling you to schedule.  If you dont hear from them, or they were unable to  reach you, please call [phone] to schedule.  Follow up with primary care provider as planned or for acute medical concerns.  Call the psychiatric nurse line with medication questions or concerns at [phone].      Medication Refills:  If you need any refills please call your pharmacy and they will contact us. Our fax number for refills is [phone]. Please allow three business for refill processing. If you need to  your refill at a new pharmacy, please contact the new pharmacy directly. The new pharmacy will help you get your medications transferred.     [facility] Assistance [phone]  Financial Assistance [phone]  [employer] Billing [phone]  Central Billing Office, [employer]: [phone]  [facility] Billing [phone]  Medical Records [phone]  [facility] Patient Bill of Rights [email or web address]       MENTAL HEALTH CRISIS RESOURCES:  For a emergency help, please call 911 or go to the nearest Emergency Department.     Emergency Walk-In Options:   EmPATH Unit @ [facility] ([place]): [phone] - Specialized mental health emergency area designed to be [facility] ([place]): [phone]  [facility] ([place]): [phone]  [facility]): [phone]    National Crisis Information: Call 988 Suicide and Crisis Lifeline  Crisis Text Line (free 24/7):  call **CRISIS (**339176) Crisis or use the texting option by texting 288859.   National Suicide Prevention Lifeline: Call 988  Poison Control Center: 4-274-813-6859  Trans Lifeline: 1-441.486.8948 - Hotline for transgender people of all ages  The [employer]: 8-652-639-9155 - Hotline for LGBT youth   List of all [facility] resources:   https://[place].gov/dhs/people-we-serve/adults/health-care/mental-health/resources/crisis-contacts.jsp    For Non-Emergency Support:   Fast Tracker: Mental Health  & Substance Use Disorder Resources -   https://www.fasttrackermn.org/       Again thank you for choosing [facility] MENTAL HEALTH & ADDICTION [facility] and please let us know how we can best partner with you to improve you and your family's health.    You may be receiving a survey regarding this appointment. We would love to have your feedback, both positive and negative. The survey is done by an external company, so your answers are anonymous.

## 2023-08-04 NOTE — NURSING NOTE
Is the patient currently in the state of [place]? YES    Visit mode:VIDEO    If the visit is dropped, the patient can be reconnected by: VIDEO VISIT: Text to cell phone: [phone]    Will anyone else be joining the visit? NO      How would you like to obtain your AVS? MyChart    Are changes needed to the allergy or medication list? NO    Reason for visit: RECHECK     Care team has reviewed attendance agreement with patient. Patient advised that two failed appointments within 6 months may lead to termination of current episode of care.
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
## 2023-08-04 NOTE — ASSESSMENT & PLAN NOTE
Not on medications.  Did not tolerate the Fanapt due to akathisia and sedation.  Will stay away from Second Generation Antipsychotics.  Depression is primary.  Never on an selective serotonin reuptake inhibitor.  Will trial sertraline titrated to 50 mg.  Mood lability is likely complicated by cluster B traits including lack of ability to regulate emotions, intense reaction to disappointments, recurrent suicidal ideations and threats, feelings of aloneness and fear of abandonment. Primary intervention therapy to learn stress tolerance skills and mindfulness techniques.   Follow-up in six weeks with prescriber and [facility] [place]

## 2023-09-14 NOTE — PROGRESS NOTES
"[facility]  Sept 15, 2023      Behavioral Health Clinician Progress Note    Patient Name: Shiloh Cheatham \"Lc\" (they/them)          Service Type:  Individual      Service Location:   MyChart / Email (patient reached)     Session Start Time: 1100am  Session End Time: 1110am      Session Length: 10 min     Attendees: Patient     Service Modality:  Video Visit:      Provider verified identity through the following two step process.  Patient provided:  Patient photo and Patient is known previously to provider    Telemedicine Visit: The patient's condition can be safely assessed and treated via synchronous audio and visual telemedicine encounter.      Reason for Telemedicine Visit: Services only offered telehealth    Originating Site (Patient Location): Patient's home    Distant Site (Provider Location): [facility]    Consent:  The patient/guardian has verbally consented to: the potential risks and benefits of telemedicine (video visit) versus in person care; bill my insurance or make self-payment for services provided; and responsibility for payment of non-covered services.     Patient would like the video invitation sent by:  My Chart    Mode of Communication:  Video Conference via [facility]    Distant Location (Provider):  On-site    As the provider I attest to compliance with applicable laws and regulations related to telemedicine.    Visit Activities (Refresh list every visit): [facility] Only    Diagnostic Assessment Date: 3/10/2023  Treatment Plan Review Date: 10/27/2023  See Flowsheets for today's PHQ-9 and MYNOR-7 results  Previous PHQ-9:       5/4/2023     9:58 AM 8/3/2023     6:19 PM 9/15/2023    10:42 AM   PHQ-9 SCORE   PHQ-9 Total Score Aidan 16 (Moderately severe depression) 26 (Severe depression) 14 (Moderate depression)   PHQ-9 Total Score 16 26 14     Previous MYNOR-7:       4/11/2023     8:46 AM 4/24/2023     7:13 " PM 6/2/2023     3:08 AM   MYNOR-7 SCORE   Total Score  20 (severe anxiety) 11 (moderate anxiety)   Total Score 18 20 11    11       AMANDA LEVEL:       No data to display                DATA  Extended Session (60+ minutes): No  Interactive Complexity: No  Crisis: No  [facility] Patient: No    Treatment Objective(s) Addressed in This Session:  Target Behavior(s):  improve mood stability    Depressed Mood: Decrease frequency and intensity of feeling down, depressed, hopeless    Current Stressors / Issues:  MH update: Pt reports that their mood has improved but increase in sleep. Their anxiety is much improved. They suspect that it may have been mostly social anxiety because that has dimished. They've noticed that they are trying to be more social. No urges to self harm. Their work has become less stressful.   Stresses: No  Appetite: unremarkable  Sleep: 9 hours and naps during the day  Therapy: ADHD testing scheduled for Feb. 2024, trying to get scheduled at [facility] for trauma informd therapy, there is an order in the chart so [facility] gave pt phone number to scheduling to explore other options  RANDAL: No  Preg:  No  Goals/progress on goals: improved symptoms  Most important: medication update       Progress on Treatment Objective(s) / Homework:  No improvement - ACTION (Actively working towards change); Intervened by reinforcing change plan / affirming steps taken    Motivational Interviewing    MI Intervention: Co-Developed Goal: improve mood stability, Expressed Empathy/Understanding, Open-ended questions, Reflections: simple and complex, Change talk (evoked) and Reframe     Change Talk Expressed by the Patient: Desire to change Ability to change Reasons to change Need to change Committment to change Activation Taking steps    Provider Response to Change Talk: E - Evoked more info from patient about behavior change, A - Affirmed patient's thoughts, decisions, or attempts at behavior change, R - Reflected patient's change talk and S -  Summarized patient's change talk statements   The following assessments were completed by patient for this visit:  PROMIS 10-Global Health (all questions and answers displayed):       2/15/2023    11:36 AM 3/9/2023    12:46 PM 8/3/2023     6:21 PM   PROMIS 10   In general, would you say your health is: Fair Good Fair   In general, would you say your quality of life is: Fair Poor Fair   In general, how would you rate your physical health? Fair Good Fair   In general, how would you rate your mental health, including your mood and your ability to think? Poor Poor Poor   In general, how would you rate your satisfaction with your social activities and relationships? Fair Fair Fair   In general, please rate how well you carry out your usual social activities and roles Fair Good Poor   To what extent are you able to carry out your everyday physical activities such as walking, climbing stairs, carrying groceries, or moving a chair? Mostly Moderately A little   In the past 7 days, how often have you been bothered by emotional problems such as feeling anxious, depressed, or irritable? Always Always Always   In the past 7 days, how would you rate your fatigue on average? Moderate Moderate Moderate   In the past 7 days, how would you rate your pain on average, where 0 means no pain, and 10 means worst imaginable pain? 4 4 4   In general, would you say your health is: 2 3 2    2   In general, would you say your quality of life is: 2 1 2    2   In general, how would you rate your physical health? 2 3 2    2   In general, how would you rate your mental health, including your mood and your ability to think? 1 1 1    1   In general, how would you rate your satisfaction with your social activities and relationships? 2 2 2    2   In general, please rate how well you carry out your usual social activities and roles. (This includes activities at home, at work and in your community, and responsibilities as a parent, child, spouse,  employee, friend, etc.) 2 3 1    1   To what extent are you able to carry out your everyday physical activities such as walking, climbing stairs, carrying groceries, or moving a chair? 4 3 2    2   In the past 7 days, how often have you been bothered by emotional problems such as feeling anxious, depressed, or irritable? 5 5 5    5   In the past 7 days, how would you rate your fatigue on average? 3 3 3    3   In the past 7 days, how would you rate your pain on average, where 0 means no pain, and 10 means worst imaginable pain? 4 4 4    4   Global Mental Health Score 6 5 6    6   Global Physical Health Score 12 12 10    10   PROMIS TOTAL - SUBSCORES 18 17 16    16     PROMIS 10-Global Health (only subscores and total score):       2/15/2023    11:36 AM 3/9/2023    12:46 PM 8/3/2023     6:21 PM   PROMIS-10 Scores Only   Global Mental Health Score 6 5 6    6   Global Physical Health Score 12 12 10    10   PROMIS TOTAL - SUBSCORES 18 17 16    16       Care Plan review completed: Yes    Medication Review:  No changes to current psychiatric medication(s)    Medication Compliance:  Yes    Changes in Health Issues:   None reported    Chemical Use Review:   Substance Use: Chemical use reviewed, no active concerns identified      Tobacco Use: No current tobacco use.      Assessment: Current Emotional / Mental Status (status of significant symptoms):  Risk status (Self / Other harm or suicidal ideation)  Patient denies a history of suicidal ideation, suicide attempts, self-injurious behavior, homicidal ideation, homicidal behavior and and other safety concerns  Patient denies current fears or concerns for personal safety.  Patient denies current or recent suicidal ideation or behaviors.  Patient denies current or recent homicidal ideation or behaviors.  Patient denies current or recent self injurious behavior or ideation.  Patient denies other safety concerns.  A safety and risk management plan has not been developed at this  time, however patient was encouraged to call [place] [phone] should there be a change in any of these risk factors.    Appearance:   Appropriate   Eye Contact:   Good   Psychomotor Behavior: Normal   Attitude:   Cooperative   Orientation:   All  Speech   Rate / Production: Normal    Volume:  Normal   Mood:    Normal  Affect:    Congruent   Thought Content:  Clear   Thought Form:  Coherent  Logical   Insight:    Good     Diagnoses:  1. Depression, major, recurrent, mild (H)    2. Generalized anxiety disorder            Collateral Reports Completed:  Collaborated with [facility] team    Plan: (Homework, other):  Patient was given information about behavioral services and encouraged to schedule a follow up appointment with the clinic [facility] in 1 month.  Lc Cheatham was also given information about mental health symptoms and treatment options .  CD Recommendations: No indications of CD issues. Marimar Webb [facility] [place]         ______________________________________________________________________    Integrated Primary Care Behavioral Health Treatment Plan    Patient's Name: Shiloh Cheatham  YOB: 2000    Date of Creation: 4/27/2023  Date Treatment Plan Last Reviewed/Revised: 7/27/2023    DSM5 Diagnoses: 296.32 (F33.1) Major Depressive Disorder, Recurrent Episode, Moderate _ and With anxious distress  Psychosocial / Contextual Factors: hx of trauma, TBI      Referral / Collaboration:  Collaborated with [facility] team.    Anticipated number of session for this episode of care: 10-12  Anticipation frequency of session: Monthly  Anticipated Duration of each session: 16-37 minutes  Treatment plan will be reviewed in 90 days or when goals have been changed.       MeasurableTreatment Goal(s) related to diagnosis / functional impairment(s)  Goal 1: Patient will experience improved mood    I will know I've met my goal when more manageable anxiety, panic attacks going away, mood stability, alleviated depression.       Objective #A (Patient Action)    Patient will Decrease frequency and intensity of feeling down, depressed, hopeless.  Status: New - Date: 10/27/2023      Intervention(s)  Therapist will teach emotional regulation skills. to manage depression .      Patient has reviewed and agreed to the above plan.      Marimar Webb, [facility]  Sept 15, 2023

## 2023-09-15 ENCOUNTER — VIRTUAL VISIT (OUTPATIENT)
Dept: PSYCHIATRY | Facility: CLINIC | Age: 23
End: 2023-09-15
Payer: COMMERCIAL

## 2023-09-15 ENCOUNTER — VIRTUAL VISIT (OUTPATIENT)
Dept: BEHAVIORAL HEALTH | Facility: CLINIC | Age: 23
End: 2023-09-15
Payer: COMMERCIAL

## 2023-09-15 DIAGNOSIS — F41.1 GENERALIZED ANXIETY DISORDER: ICD-10-CM

## 2023-09-15 DIAGNOSIS — F33.0 DEPRESSION, MAJOR, RECURRENT, MILD (H): Primary | ICD-10-CM

## 2023-09-15 DIAGNOSIS — F60.3 BORDERLINE PERSONALITY DISORDER (H): Primary | ICD-10-CM

## 2023-09-15 PROCEDURE — 99207 PR NO BILLABLE SERVICE THIS VISIT: CPT | Mod: VID | Performed by: SOCIAL WORKER

## 2023-09-15 PROCEDURE — 99214 OFFICE O/P EST MOD 30 MIN: CPT | Mod: VID | Performed by: NURSE PRACTITIONER

## 2023-09-15 RX ORDER — SERTRALINE HYDROCHLORIDE 100 MG/1
100 TABLET, FILM COATED ORAL DAILY
Qty: 30 TABLET | Refills: 1 | Status: SHIPPED | OUTPATIENT
Start: 2023-09-15 | End: 2023-11-15

## 2023-09-15 ASSESSMENT — PATIENT HEALTH QUESTIONNAIRE - PHQ9
SUM OF ALL RESPONSES TO PHQ QUESTIONS 1-9: 14
10. IF YOU CHECKED OFF ANY PROBLEMS, HOW DIFFICULT HAVE THESE PROBLEMS MADE IT FOR YOU TO DO YOUR WORK, TAKE CARE OF THINGS AT HOME, OR GET ALONG WITH OTHER PEOPLE: VERY DIFFICULT
SUM OF ALL RESPONSES TO PHQ QUESTIONS 1-9: 14

## 2023-09-15 NOTE — NURSING NOTE
Is the patient currently in the state of [place]? YES    Visit mode:VIDEO    If the visit is dropped, the patient can be reconnected by: VIDEO VISIT: Text to cell phone:   Telephone Information:   Mobile [phone]       Will anyone else be joining the visit? NO  (If patient encounters technical issues they should call [phone]    How would you like to obtain your AVS? MyChart    Are changes needed to the allergy or medication list? Pt stated no changes to allergies and Pt stated no med changes    Reason for visit: DEREK PISANO
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
## 2023-09-18 NOTE — ASSESSMENT & PLAN NOTE
Robust improvement in anxiety and depression which then decreases the hallucinations and paranoia.  Will further increase to 100 mg at this time.  Continue plan for psych testing currently scheduled for February 2024.  Follow-up two months

## 2023-09-18 NOTE — PATIENT INSTRUCTIONS
**For crisis resources, please see the information at the end of this document**     Thank you for coming to the [facility] [place] [facility].    TREATMENT PLAN:    MEDICATIONS:   - increase the sertraline to 100 mg daily    -PSYCHOEDUCATION:      CONSULTS/REFERRALS:   Continue therapy     LABS/PROCEDURES:    Please call your [place] clinic and ask for a lab only appointment at your earliest convenience.  If your results are reassuring or normal they will be mailed to you or sent through [place] within 7 days. If the lab tests need quick action we will call you with the results. The phone number we will call with results is # [phone].      FOLLOW UP: Schedule an appointment with me in two months or sooner as needed.  The intake team should be calling you to schedule.  If you dont hear from them, or they were unable to reach you, please call [phone] to schedule.  Follow up with primary care provider as planned or for acute medical concerns.  Call the psychiatric nurse line with medication questions or concerns at [phone].      Medication Refills:  If you need any refills please call your pharmacy and they will contact us. Our fax number for refills is [phone]. Please allow three business for refill processing. If you need to  your refill at a new pharmacy, please contact the new pharmacy directly. The new pharmacy will help you get your medications transferred.     [facility] Assistance [phone]  Financial Assistance [phone]  [employer] Billing [phone]  Central Billing Office, [facility]: [phone]  [place] Billing [phone]  Medical Records [phone]  [place] Patient Bill of Rights [email or web address]       MENTAL HEALTH CRISIS RESOURCES:  For a emergency help, please call 911 or go to the nearest Emergency Department.     Emergency Walk-In Options:   EmPATH Unit @ [place]  [facility] ([place]): [phone] - Specialized mental health emergency area designed to be calming  [facility] ([place]): [phone]  [facility] [place] [place] Acute Psychiatry Services ([place]): [phone]  [facility] ([place]): [phone]    National Crisis Information: Call 988 Suicide and Crisis Lifeline  Crisis Text Line (free 24/7):  call **CRISIS (**610153) Crisis or use the texting option by texting 354048.   National Suicide Prevention Lifeline: Call 988  Poison Control Center: 4-617-780-0069  Trans Lifeline: 2-019-715-9894 - Hotline for transgender people of all ages  The Ankit Project: 6-084-933-1363 - Hotline for LGBT youth   List of all [facility] resources:   https://[place].gov/dhs/people-we-serve/adults/health-care/mental-health/resources/crisis-contacts.jsp    For Non-Emergency Support:   Fast Tracker: Mental Health & Substance Use Disorder Resources -   https://www.fasttrackermn.org/       Again thank you for choosing [facility] [place] [facility] and please let us know how we can best partner with you to improve you and your family's health.    You may be receiving a survey regarding this appointment. We would love to have your feedback, both positive and negative. The survey is done by an external company, so your answers are anonymous.

## 2023-10-12 ENCOUNTER — VIRTUAL VISIT (OUTPATIENT)
Dept: FAMILY MEDICINE | Facility: CLINIC | Age: 23
End: 2023-10-12
Payer: COMMERCIAL

## 2023-10-12 DIAGNOSIS — F60.3 BORDERLINE PERSONALITY DISORDER (H): ICD-10-CM

## 2023-10-12 DIAGNOSIS — F25.1 SCHIZOAFFECTIVE DISORDER, DEPRESSIVE TYPE (H): ICD-10-CM

## 2023-10-12 DIAGNOSIS — F64.0 TRANSGENDER PERSON ON HORMONE THERAPY: Primary | ICD-10-CM

## 2023-10-12 DIAGNOSIS — Z79.899 TRANSGENDER PERSON ON HORMONE THERAPY: Primary | ICD-10-CM

## 2023-10-12 PROCEDURE — 99214 OFFICE O/P EST MOD 30 MIN: CPT | Mod: VID | Performed by: FAMILY MEDICINE

## 2023-10-12 RX ORDER — TESTOSTERONE 1.62 MG/G
40.5 GEL TRANSDERMAL DAILY
Qty: 150 G | Refills: 1 | Status: SHIPPED | OUTPATIENT
Start: 2023-10-12

## 2023-10-12 ASSESSMENT — ENCOUNTER SYMPTOMS: SHORTNESS OF BREATH: 0

## 2023-10-12 ASSESSMENT — PAIN SCALES - GENERAL: PAINLEVEL: MODERATE PAIN (5)

## 2023-10-12 NOTE — PROGRESS NOTES
The patient has been notified of following:       Patient has given verbal consent for Video visit? Yes    Patient would like the video invitation sent by: Send to e-mail at:     Video Start Time: 3:05 PM    ellyn Platt is a 23 year old individual that uses pronouns They/Them/Their/Theirs that presents today for follow up of:  masculinizing hormone therapy.   Alone or accompanied by: accompanied today by  Gender identity: other masculine androgynous  Started Hormone  therapy  8/2023  Continues on Androgel/generic 20.25 mg daily  Any special concerns today?    Reviewed psychiatry and therapy notes since last visit;   Would like to increase dose, now more interested in binary transition--the more testosterone they have been on, feels internally better. No particular physical goals, but open to all and more appealing since starting testosterone.       On hormones?  YES +++ Shot day of the week? Not applicable-taking pills/patch/gel      Due for labs?  Yes      +++ Refills of meds needed?  Yes  Gender related body changes since last visit:   Body odor different  Increase libido, energy  Mild increase body hair  Mood good, more stable and able to follow through with daily tasks    Breakthrough bleeding? Yes--LMP 10/10/2023    New health concerns since last visit:  ---Note that depression and anxiety scores have improved since last visit    --ADHD testing not  scheduled until 2/2024    Past Surgical History:   Procedure Laterality Date    ARTHROSCOPY SHOULDER SUPERIOR LABRUM ANTERIOR TO POSTERIOR REPAIR Right 3/29/2019    Procedure: Right Shoulder Arthroscopic Labral Repair;  Surgeon: Adina Cortes MD;  [facility]       Patient Active Problem List   Diagnosis    Bipolar disorder, current episode mixed, mild (H)    Hx of traumatic brain injury    Schizoaffective disorder, depressive type (H)    Borderline personality disorder (H)    Mild neurocognitive disorder    High risk medication use     Gender dysphoria    PTSD (post-traumatic stress disorder)    Anxiety       Current Outpatient Medications   Medication Sig Dispense Refill    albuterol (PROAIR HFA/PROVENTIL HFA/VENTOLIN HFA) 108 (90 Base) MCG/ACT inhaler Inhale 2 puffs into the lungs every 6 hours as needed for shortness of breath, wheezing or cough 18 g 0    propranolol (INDERAL) 20 MG tablet Take 1 tablet (20 mg) by mouth 2 times daily 60 tablet 1    sertraline (ZOLOFT) 100 MG tablet Take 1 tablet (100 mg) by mouth daily 30 tablet 1    testosterone (ANDROGEL 1.62 % PUMP) 20.25 MG/ACT gel Place 1 Pump onto the skin daily Apply from dispenser to clean, dry, intact skin of the upper arms and shoulders. 150 g 0       History   Smoking Status    Never   Smokeless Tobacco    Never          Allergies   Allergen Reactions    Hydroxyzine Palpitations       There are no preventive care reminders to display for this patient.      Problem, Medication and Allergy Lists were reviewed and are current..         Review of Systems:   Review of Systems   Respiratory:  Negative for shortness of breath.    Cardiovascular:  Negative for chest pain.            Labs:   Results from last visit:  Admission on 04/21/2023, Discharged on 04/21/2023   Component Date Value Ref Range Status    Ventricular Rate 04/21/2023 101  BPM Final    Atrial Rate 04/21/2023 101  BPM Final    ID Interval 04/21/2023 144  ms Final    QRS Duration 04/21/2023 84  ms Final    QT 04/21/2023 336  ms Final    QTc 04/21/2023 435  ms Final    P Axis 04/21/2023 56  degrees Final    R AXIS 04/21/2023 81  degrees Final    T Axis 04/21/2023 30  degrees Final    Interpretation ECG 04/21/2023    Final                    Value:Sinus tachycardia  Otherwise normal ECG  Unconfirmed report - interpretation of this ECG is computer generated - see medical record for final interpretation  Confirmed by - EMERGENCY ROOM, PHYSICIAN ([id number]),  JAKE DELEON ([id number]) on 4/21/2023 8:12:36 PM      Sodium 04/21/2023  142  136 - 145 mmol/L Final    Potassium 04/21/2023 3.5  3.4 - 5.3 mmol/L Final    Chloride 04/21/2023 106  98 - 107 mmol/L Final    Carbon Dioxide (CO2) 04/21/2023 23  22 - 29 mmol/L Final    Anion Gap 04/21/2023 13  7 - 15 mmol/L Final    Urea Nitrogen 04/21/2023 10.0  6.0 - 20.0 mg/dL Final    Creatinine 04/21/2023 0.85  0.51 - 1.17 mg/dL Final    Male and Female  0-2 Months    0.31-0.88 mg/dL  2-12 Months   0.16-0.39 mg/dL  1-2 Years     0.18-0.35 mg/dL  3-4 Years     0.26-0.42 mg/dL  5-6 Years     0.29-0.47 mg/dL  7-8 Years     0.34-0.53 mg/dL  9-10 Years    0.33-0.64 mg/dL  11-12 Years   0.44-0.68 mg/dL  13-14 Years   0.46-0.77 mg/dL    Female  15 Years and older  0.51-0.95 mg/dL    Male  15 Years and older  0.67-1.17 mg/dL        Calcium 04/21/2023 9.5  8.6 - 10.0 mg/dL Final    Glucose 04/21/2023 125 (H)  70 - 99 mg/dL Final    Alkaline Phosphatase 04/21/2023 82  35 - 129 U/L Final    Female:    0-15 days     U/L  15d-1 year   122-469 U/L  1-10 years   142-335 U/L  10-13 years  129-417 U/L  13-15 years   U/L  15-17 years   U/L  17-19 years  45-87 U/L  19 years and older   U/L      Male:  0-15 days     U/L  15d-1 year   122-469 U/L  1-10 years   142-335 U/L  10-13 years  129-417 U/L  13-15 years  116-468 U/L  15-17 years   U/L  17-19 years   U/L  19 years and older   U/L      AST 04/21/2023 17  10 - 50 U/L Final    Female   All ages 10-35 U/L     Male   All ages 10-50 U/L        ALT 04/21/2023 14  10 - 50 U/L Final    Female   All ages 10-35 U/L     Male   All ages 10-50 U/L        Protein Total 04/21/2023 7.8  6.4 - 8.3 g/dL Final    Albumin 04/21/2023 4.7  3.5 - 5.2 g/dL Final    Bilirubin Total 04/21/2023 0.4  <=1.2 mg/dL Final    GFR Estimate 04/21/2023 >90  >60 mL/min/1.73m2 Final    GFR not calculated when sex unspecified or nonbinary.  eGFR calculated using 2021 CKD-EPI equation.    Troponin T, High Sensitivity 04/21/2023 <6  <=22 ng/L Final    Female  Reference Range  <=14 ng/L    Male Reference Range  <=22 ng/L  Either a High Sensitivity Troponin T baseline (0 hours) value = 100 ng/L, or an increase in High Sensitivity Troponin T = 7 ng/L at 2 hours compared to 0 hours (2-0 hours), suggests myocardial injury, and urgent clinical attention is required.    If the 2-0 hours increase is <7 ng/L, a High Sensitivity Troponin T result above gender-specific reference ranges warrants further evaluation.   Recommendations for further evaluation include correlation with clinical decision-making tool (e.g., HEART), a 3rd High Sensitivity Troponin T test 2 hours after the 2nd (a 20% change from baseline would represent concern), admission for observation, close PCC/cardiology follow-up, or urgent outpatient provocative testing.    hCG Serum Qualitative 04/21/2023 Negative  Negative Final    This test is for screening purposes.  Results should be interpreted along with the clinical picture.  Confirmation testing is available if warranted by ordering [id number], HCG Quantitative Pregnancy.    WBC Count 04/21/2023 8.7  4.0 - 11.0 10e3/uL Final    RBC Count 04/21/2023 4.61  3.80 - 5.90 10e6/uL Final    Reference Range:                                                     Female 3.80-5.20 10e6/uL                                      Male 4.40-5.90 10e6u/L    Hemoglobin 04/21/2023 13.1  11.7 - 17.7 g/dL Final    Reference Range:                                                     Female 11.7-15.7 g/dL                                      Male 13.3-17.7 g/dL    Hematocrit 04/21/2023 41.0  35.0 - 53.0 % Final    Reference Range:                                                     Female 35.0-47.0 %                                            Male 40.0-54.0 %    MCV 04/21/2023 89  78 - 100 fL Final    MCH 04/21/2023 28.4  26.5 - 33.0 pg Final    MCHC 04/21/2023 32.0  31.5 - 36.5 g/dL Final    RDW 04/21/2023 12.7  10.0 - 15.0 % Final    Platelet Count 04/21/2023 269  150 - 450 10e3/uL Final     % Neutrophils 04/21/2023 65  % Final    % Lymphocytes 04/21/2023 24  % Final    % Monocytes 04/21/2023 7  % Final    % Eosinophils 04/21/2023 3  % Final    % Basophils 04/21/2023 1  % Final    % Immature Granulocytes 04/21/2023 0  % Final    NRBCs per 100 WBC 04/21/2023 0  <1 /100 Final    Absolute Neutrophils 04/21/2023 5.7  1.6 - 8.3 10e3/uL Final    Absolute Lymphocytes 04/21/2023 2.1  0.8 - 5.3 10e3/uL Final    Absolute Monocytes 04/21/2023 0.6  0.0 - 1.3 10e3/uL Final    Absolute Eosinophils 04/21/2023 0.3  0.0 - 0.7 10e3/uL Final    Absolute Basophils 04/21/2023 0.0  0.0 - 0.2 10e3/uL Final    Absolute Immature Granulocytes 04/21/2023 0.0  <=0.4 10e3/uL Final    Absolute NRBCs 04/21/2023 0.0  10e3/uL Final    D-Dimer Quantitative 04/21/2023 0.33  0.00 - 0.50 ug/mL FEU Final         EXAM:  Constitutional: healthy, alert, and no distress   Psychiatric: mentation appears normal and affect normal/bright     Assessment and Plan   Transgender person on hormone therapy  Schizoaffective disorder  BPD  Given improvements in overal mental health and openess to all changes with testosterone therapy, will  Increase Androgel to 40.5 mg daily  Lab: testosterone  in 1 month  Discussed continued exploration of GAHT/gender goals   Continue with mental health supports      Follow-up 3 months    Contraception:   not needed    Video-Visit Details    Type of service:  Video Visit    Video End Time (time video stopped): 320    Originating Location (pt. Location): Home    Distant Location (provider location):  [facility]     Mode of Communication:  Video Conference via video platform: Wilfredo Owens MD      Results by [email or web address]  Questions were elicited and answered.     Dickson Owens MD

## 2023-10-12 NOTE — PROGRESS NOTES
Virtual Visit Details    Type of service:  Video Visit     Originating Location (pt. Location): Home    Distant Location (provider location):  On-site  Platform used for Video Visit: Brigid

## 2023-10-12 NOTE — NURSING NOTE
Is the patient currently in the state of [place]? YES    Visit mode:VIDEO    If the visit is dropped, the patient can be reconnected by: VIDEO VISIT: Text to cell phone:   Telephone Information:   Mobile [phone]       Will anyone else be joining the visit? No  (If patient encounters technical issues they should call [phone])    How would you like to obtain your AVS? MyChart    Are changes needed to the allergy or medication list? Pt stated no changes to allergies and Pt stated no med changes    Rooming Documentation: Assigned questionnaire(s) completed .    Reason for visit: ALIYA Hassan

## 2023-10-26 ENCOUNTER — OFFICE VISIT (OUTPATIENT)
Dept: FAMILY MEDICINE | Facility: CLINIC | Age: 23
End: 2023-10-26
Payer: COMMERCIAL

## 2023-10-26 VITALS
RESPIRATION RATE: 14 BRPM | OXYGEN SATURATION: 97 % | TEMPERATURE: 98.9 F | SYSTOLIC BLOOD PRESSURE: 123 MMHG | HEART RATE: 80 BPM | DIASTOLIC BLOOD PRESSURE: 79 MMHG

## 2023-10-26 DIAGNOSIS — H65.02 ACUTE SEROUS OTITIS MEDIA OF LEFT EAR, RECURRENCE NOT SPECIFIED: Primary | ICD-10-CM

## 2023-10-26 PROCEDURE — 99213 OFFICE O/P EST LOW 20 MIN: CPT

## 2023-10-26 NOTE — PROGRESS NOTES
Assessment & Plan     Acute serous otitis media of left ear, recurrence not specified  Add anti-histamine to regime  Serous OM  No exudate             No follow-ups on file.    [facility]  [facility]    Sarah Platt is a 23 year old adult who presents to clinic today for the following health issues:  Chief Complaint   Patient presents with    Ear Problem     Left ear pain x2days, ringing in ear-constant, no fever    Nasal Congestion    Cough     Covid tested negative on 10/22/23     HPI    Has left ear pain/plugged up  Tylenol and sudafed  Neg COVID          Review of Systems        Objective    /79   Pulse 80   Temp 98.9  F (37.2  C) (Oral)   Resp 14   LMP 10/12/2023 (Approximate)   SpO2 97%   Physical Exam  Vitals and nursing note reviewed.   Constitutional:       Appearance: Normal appearance.   HENT:      Right Ear: Tympanic membrane and ear canal normal.      Ears:      Comments: Left ear with serous fluid     Mouth/Throat:      Mouth: Mucous membranes are moist.   Eyes:      Extraocular Movements: Extraocular movements intact.      Pupils: Pupils are equal, round, and reactive to light.   Cardiovascular:      Rate and Rhythm: Normal rate and regular rhythm.      Pulses: Normal pulses.      Heart sounds: Normal heart sounds.   Pulmonary:      Effort: Pulmonary effort is normal.      Breath sounds: Normal breath sounds.   Neurological:      Mental Status: Lc Cheatham is alert.

## 2023-11-10 ENCOUNTER — OFFICE VISIT (OUTPATIENT)
Dept: FAMILY MEDICINE | Facility: CLINIC | Age: 23
End: 2023-11-10
Payer: COMMERCIAL

## 2023-11-10 VITALS
DIASTOLIC BLOOD PRESSURE: 82 MMHG | SYSTOLIC BLOOD PRESSURE: 124 MMHG | BODY MASS INDEX: 35.43 KG/M2 | WEIGHT: 226.2 LBS | HEART RATE: 74 BPM | OXYGEN SATURATION: 97 % | TEMPERATURE: 98.3 F

## 2023-11-10 DIAGNOSIS — M54.50 ACUTE BILATERAL LOW BACK PAIN WITHOUT SCIATICA: Primary | ICD-10-CM

## 2023-11-10 PROCEDURE — 99213 OFFICE O/P EST LOW 20 MIN: CPT | Performed by: FAMILY MEDICINE

## 2023-11-10 ASSESSMENT — PATIENT HEALTH QUESTIONNAIRE - PHQ9
SUM OF ALL RESPONSES TO PHQ QUESTIONS 1-9: 12
SUM OF ALL RESPONSES TO PHQ QUESTIONS 1-9: 12
10. IF YOU CHECKED OFF ANY PROBLEMS, HOW DIFFICULT HAVE THESE PROBLEMS MADE IT FOR YOU TO DO YOUR WORK, TAKE CARE OF THINGS AT HOME, OR GET ALONG WITH OTHER PEOPLE: SOMEWHAT DIFFICULT

## 2023-11-10 ASSESSMENT — ENCOUNTER SYMPTOMS: BACK PAIN: 1

## 2023-11-10 NOTE — PROGRESS NOTES
"    Assessment & Plan   Problem List Items Addressed This Visit    None  Visit Diagnoses       Acute bilateral low back pain without sciatica    -  Primary           Continue ibuprofen/apap - declined steroid burst for now     BMI:   Estimated body mass index is 35.43 kg/m  as calculated from the following:    Height as of 4/28/23: 1.702 m (5' 7\").    Weight as of this encounter: 102.6 kg (226 lb 3.2 oz).       Depression Screening Follow Up        11/10/2023    10:48 AM   PHQ   PHQ-9 Total Score 12   Q9: Thoughts of better off dead/self-harm past 2 weeks Not at all       DO MARLON CAMPA [facility] ISAAC Platt is a 23 year old, presenting for the following health issues:  Back Pain      11/10/2023     1:35 PM   Additional Questions   Roomed by Adina   Accompanied by none         11/10/2023     1:35 PM   Patient Reported Additional Medications   Patient reports taking the following new medications none       History of Present Illness       Back Pain:  Lc Cheatham presents for follow up of back pain. Patient's back pain is a chronic problem.  Location of back pain:  Right lower back, left lower back, right middle of back and left middle of back  Description of back pain: dull ache and sharp  Back pain spreads: nowhere    Since patient first noticed back pain, pain is: always present, but gets better and worse  Does back pain interfere with Lc Cheatham's job:  Yes       Lc Cheatham eats 2-3 servings of fruits and vegetables daily.Lc Cheatham consumes 1 sweetened beverage(s) daily.Lc Cheatham exercises with enough effort to increase Lc Garcias heart rate 30 to 60 minutes per day.  Lc Cheatham exercises with enough effort to increase Lc Cheatham's heart rate 4 days per week.   Lc Cheatham is taking medications regularly.                 Review of Systems   Musculoskeletal:  Positive for back pain.            Objective    /82   Pulse 74   Temp 98.3  F " (36.8  C) (Oral)   Wt 102.6 kg (226 lb 3.2 oz)   LMP 10/12/2023 (Approximate)   SpO2 97%   BMI 35.43 kg/m    Body mass index is 35.43 kg/m .  Physical Exam  Constitutional:       General: Lc Cheatham is not in acute distress.     Appearance: Lc Cheatham is not ill-appearing.   Musculoskeletal:      Lumbar back: No deformity, signs of trauma, lacerations, tenderness or bony tenderness. Normal range of motion. Negative right straight leg raise test and negative left straight leg raise test.        Back:    Neurological:      Mental Status: Lc Cheatham is alert.      Deep Tendon Reflexes:      Reflex Scores:       Patellar reflexes are 2+ on the right side and 2+ on the left side.

## 2023-11-14 ENCOUNTER — FCC EXTENDED DOCUMENTATION (OUTPATIENT)
Dept: PSYCHOLOGY | Facility: CLINIC | Age: 23
End: 2023-11-14
Payer: COMMERCIAL

## 2023-11-14 NOTE — PROGRESS NOTES
Discharge Summary  Multiple Sessions    Client Name: Shiloh Cheathma MRN#: [id number] YOB: 2000      Intake / Discharge Date: 11/14/23      DSM5 Diagnoses: (Sustained by DSM5 Criteria Listed Above)  Diagnoses: 295.70  (F25.1) Schizoaffective Disorder Depressive Type  301.83 (F60.3) Borderline Personality Disorder  Psychosocial & Contextual Factors: Patient reported an increase in symptoms of anxiety daily,   PROMIS-10 Score: 16          Presenting Concern:  Patient reported experiencing an increase in anxiety and increased stress at work. Patient also reported looking into a symptomatic therapy and a therapist that focused on trauma therapy.       Reason for Discharge:  Provider is leaving the [facility].       Disposition at Time of Last Encounter:   Comments:   Patient reported feeling emotionally exhausted due to stressors within their personal life. Patient also reported an increase in stress at work.      Risk Management:   Client denies a history of suicidal ideation, suicide attempts, self-injurious behavior, homicidal ideation, homicidal behavior, and and other safety concerns  Recommended that patient call 911 or go to the local ED should there be a change in any of these risk factors.      Referred To:  Patient can all Behavioral Intake at [phone] in the future if they would like to make an appointment with a therapist within the [facility] Department.         PARTICIA IRAHETA   11/14/2023

## 2023-11-15 ENCOUNTER — VIRTUAL VISIT (OUTPATIENT)
Dept: BEHAVIORAL HEALTH | Facility: CLINIC | Age: 23
End: 2023-11-15
Payer: COMMERCIAL

## 2023-11-15 ENCOUNTER — VIRTUAL VISIT (OUTPATIENT)
Dept: PSYCHIATRY | Facility: CLINIC | Age: 23
End: 2023-11-15
Payer: COMMERCIAL

## 2023-11-15 DIAGNOSIS — F33.0 DEPRESSION, MAJOR, RECURRENT, MILD (H): Primary | ICD-10-CM

## 2023-11-15 DIAGNOSIS — F60.3 BORDERLINE PERSONALITY DISORDER (H): ICD-10-CM

## 2023-11-15 DIAGNOSIS — F41.1 GENERALIZED ANXIETY DISORDER: ICD-10-CM

## 2023-11-15 PROCEDURE — 90832 PSYTX W PT 30 MINUTES: CPT | Mod: VID | Performed by: SOCIAL WORKER

## 2023-11-15 PROCEDURE — 99214 OFFICE O/P EST MOD 30 MIN: CPT | Mod: VID | Performed by: NURSE PRACTITIONER

## 2023-11-15 RX ORDER — PROPRANOLOL HYDROCHLORIDE 20 MG/1
20 TABLET ORAL 2 TIMES DAILY
Qty: 60 TABLET | Refills: 2 | Status: SHIPPED | OUTPATIENT
Start: 2023-11-15 | End: 2024-01-11

## 2023-11-15 RX ORDER — SERTRALINE HYDROCHLORIDE 100 MG/1
200 TABLET, FILM COATED ORAL DAILY
Qty: 60 TABLET | Refills: 2 | Status: SHIPPED | OUTPATIENT
Start: 2023-11-15 | End: 2024-01-11

## 2023-11-15 ASSESSMENT — PATIENT HEALTH QUESTIONNAIRE - PHQ9
SUM OF ALL RESPONSES TO PHQ QUESTIONS 1-9: 12
10. IF YOU CHECKED OFF ANY PROBLEMS, HOW DIFFICULT HAVE THESE PROBLEMS MADE IT FOR YOU TO DO YOUR WORK, TAKE CARE OF THINGS AT HOME, OR GET ALONG WITH OTHER PEOPLE: SOMEWHAT DIFFICULT
SUM OF ALL RESPONSES TO PHQ QUESTIONS 1-9: 12
SUM OF ALL RESPONSES TO PHQ QUESTIONS 1-9: 10

## 2023-11-15 NOTE — PATIENT INSTRUCTIONS
**For crisis resources, please see the information at the end of this document**     Thank you for coming to the [facility] [place] [facility].    TREATMENT PLAN:    MEDICATIONS:   - increase sertraline to 200 mg daily.  Continue the propranolol at current dosing    -PSYCHOEDUCATION:      CONSULTS/REFERRALS:   Continue therapy     LABS/PROCEDURES:    Please call your [facility] clinic and ask for a lab only appointment at your earliest convenience.  If your results are reassuring or normal they will be mailed to you or sent through [employer] within 7 days. If the lab tests need quick action we will call you with the results. The phone number we will call with results is # [phone].      FOLLOW UP: Schedule an appointment with me in two months or sooner as needed.  The intake team should be calling you to schedule.  If you dont hear from them, or they were unable to reach you, please call [phone] to schedule.  Follow up with primary care provider as planned or for acute medical concerns.  Call the psychiatric nurse line with medication questions or concerns at [phone].      Medication Refills:  If you need any refills please call your pharmacy and they will contact us. Our fax number for refills is [phone]. Please allow three business for refill processing. If you need to  your refill at a new pharmacy, please contact the new pharmacy directly. The new pharmacy will help you get your medications transferred.     [facility] Assistance [phone]  Financial Assistance [phone]  [facility] Billing [phone]  Central Billing Office, ealth: [phone]  [facility] Billing [phone]  Medical Records [phone]  [facility] Patient Bill of Rights [email or web address]       MENTAL HEALTH CRISIS RESOURCES:  For a emergency help, please call 911 or go to the nearest Emergency Department.     Emergency  Walk-In Options:   [facility] @ [facility] ([place]): [phone] - Specialized mental health emergency area designed to be calming  [facility] ([place]): [phone]  [facility] ([place]): [phone]  [facility] ([place]): [phone]    National Crisis Information: Call 988 Suicide and Crisis Lifeline  Crisis Text Line (free 24/7):  call **CRISIS (**312691) Crisis or use the texting option by texting 794112.   National Suicide Prevention Lifeline: Call 988  Poison Control Center: 6-972-148-0587  Trans Lifeline: 8-873-475-8473 - Hotline for transgender people of all ages  [employer]: 1-533-350-8728 - Hotline for LGBT youth   List of all [facility] resources:   https://[place].gov/dhs/people-we-serve/adults/health-care/mental-health/resources/crisis-contacts.jsp    For Non-Emergency Support:   Fast Tracker: Mental Health & Substance Use Disorder Resources -   https://www.fasttrackermn.org/       Again thank you for choosing [facility] [place] [facility] and please let us know how we can best partner with you to improve you and your family's health.    You may be receiving a survey regarding this appointment. We would love to have your feedback, both positive and negative. The survey is done by an external company, so your answers are anonymous.

## 2023-11-15 NOTE — NURSING NOTE
Is the patient currently in the state of [place]? YES    Visit mode:VIDEO    If the visit is dropped, the patient can be reconnected by: VIDEO VISIT: Text to cell phone:   Telephone Information:   Mobile [phone]       Will anyone else be joining the visit? NO  (If patient encounters technical issues they should call [phone]    How would you like to obtain your AVS? MyChart    Are changes needed to the allergy or medication list? Pt stated no changes to allergies and Pt stated no med changes    Reason for visit: DEREK PISANO
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
## 2023-11-15 NOTE — ASSESSMENT & PLAN NOTE
Sertraline continues to have partial positive improvement in anxiety and mood.  Will continue to increase to 200 mg daily.  Continue the propranolol at current dosing.  Follow-up in two months

## 2023-11-15 NOTE — PROGRESS NOTES
"[facility] Psychiatry Services [facility]  November 15, 2023      Behavioral Health Clinician Progress Note    Patient Name: Shiloh Cheatham \"Lc\" (they/them)          Service Type:  Individual      Service Location:   [facility] / Email (patient reached)     Session Start Time: 226pm  Session End Time: 246 pm      Session Length: 16 - 37      Attendees: Patient     Service Modality:  Video Visit:      Provider verified identity through the following two step process.  Patient provided:  Patient photo and Patient is known previously to provider    Telemedicine Visit: The patient's condition can be safely assessed and treated via synchronous audio and visual telemedicine encounter.      Reason for Telemedicine Visit: Services only offered telehealth    Originating Site (Patient Location): Patient's home    Distant Site (Provider Location): Provider Remote Setting- Home Office    Consent:  The patient/guardian has verbally consented to: the potential risks and benefits of telemedicine (video visit) versus in person care; bill my insurance or make self-payment for services provided; and responsibility for payment of non-covered services.     Patient would like the video invitation sent by:  My Chart    Mode of Communication:  Video Conference via [facility]    Distant Location (Provider):  Off-site    As the provider I attest to compliance with applicable laws and regulations related to telemedicine.    Visit Activities (Refresh list every visit): [facility] Only    Diagnostic Assessment Date: 3/10/2023  Treatment Plan Review Date: 01/27/2024  See Flowsheets for today's PHQ-9 and MYNOR-7 results  Previous PHQ-9:       11/10/2023    10:48 AM 11/15/2023    12:47 PM 11/15/2023     2:20 PM   PHQ-9 SCORE   [facility] ([facility] 12 (Moderate depression)    PHQ-9 Total Score 12 12    12 10     Previous MYNOR-7:       4/11/2023     8:46 AM 4/24/2023     7:13 PM 6/2/2023     3:08 AM   MYNOR-7 " "SCORE   Total Score  20 (severe anxiety) 11 (moderate anxiety)   Total Score 18 20 11    11       AMANDA LEVEL:       No data to display                DATA  Extended Session (60+ minutes): No  Interactive Complexity: No  Crisis: No  [facility] Patient: No    Treatment Objective(s) Addressed in This Session:  Target Behavior(s):  improve mood stability    Depressed Mood: Decrease frequency and intensity of feeling down, depressed, hopeless    Current Stressors / Issues:   update: Pt reports that their mood has been better. They've noticed that when they miss a dose of medication the symptoms worsen. They've been more able to maintain their home better, improved motivation. They moved again, unplanned, because their roommates broke the lease and they couldn't afford to live there alone. They now live alone and feels this is better. They aren't masking, more time to decompress, able to have their own sleep schedule, increased privacy. They are trying to \"look on the bright side\". The rent is higher but they think it will work out. They started T (gel) and sometimes forget to apply (daily). They hope to move to the weekly injection perhaps this Winter. The results aren't happening as fast as they'd like. [facility] explored potential upcoming stress around the holidays. They identify financial stress, just having come out to parents, sister and her sisters fiance. Their family doesn't generally. They haven't told their grandparents because they aren't sure what kind of reaction they'd get or that they wouldn't understand. [facility] explored self care durign this time. Pt identifies taking time off work, spending time with friends, making a comfortable environment.   Stresses: upcoming holidays  Appetite: unremarkable  Sleep: waking up more but sleeps more soundly and having fewer nightmares  Therapy: ADHD testing scheduled for Feb. 2024, had 2 appointments at [facility] with 2 different therapists but they were not a good fit so they have an " appointment with Luz Elena Pressley at [facility] in March. [facility] explained they might get offered some sooner appointments through the Fast Pass option  RANDAL: No  Preg: No  Interventions: created self care plan for  Most important: medication update       Progress on Treatment Objective(s) / Homework:  Satisfactory progress - ACTION (Actively working towards change); Intervened by reinforcing change plan / affirming steps taken    Motivational Interviewing    MI Intervention: Co-Developed Goal: improve mood stability, Expressed Empathy/Understanding, Open-ended questions, Reflections: simple and complex, Change talk (evoked) and Reframe     Change Talk Expressed by the Patient: Desire to change Ability to change Reasons to change Need to change Committment to change Activation Taking steps    Provider Response to Change Talk: E - Evoked more info from patient about behavior change, A - Affirmed patient's thoughts, decisions, or attempts at behavior change, R - Reflected patient's change talk and S - Summarized patient's change talk statements   The following assessments were completed by patient for this visit:  PROMIS 10-Global Health (all questions and answers displayed):       2/15/2023    11:36 AM 3/9/2023    12:46 PM 8/3/2023     6:21 PM 11/15/2023    12:48 PM   PROMIS 10   In general, would you say your health is: Fair Good Fair Good   In general, would you say your quality of life is: Fair Poor Fair Good   In general, how would you rate your physical health? Fair Good Fair Fair   In general, how would you rate your mental health, including your mood and your ability to think? Poor Poor Poor Poor   In general, how would you rate your satisfaction with your social activities and relationships? Fair Fair Fair Fair   In general, please rate how well you carry out your usual social activities and roles Fair Good Poor Fair   To what extent are you able to carry out your everyday physical activities such as walking, climbing  stairs, carrying groceries, or moving a chair? Mostly Moderately A little A little   In the past 7 days, how often have you been bothered by emotional problems such as feeling anxious, depressed, or irritable? Always Always Always Sometimes   In the past 7 days, how would you rate your fatigue on average? Moderate Moderate Moderate Moderate   In the past 7 days, how would you rate your pain on average, where 0 means no pain, and 10 means worst imaginable pain? 4 4 4 3   In general, would you say your health is: 2 3 2    2 3    3    3    3   In general, would you say your quality of life is: 2 1 2    2 3    3    3    3   In general, how would you rate your physical health? 2 3 2    2 2    2    2    2   In general, how would you rate your mental health, including your mood and your ability to think? 1 1 1    1 1    1    1    1   In general, how would you rate your satisfaction with your social activities and relationships? 2 2 2    2 2    2    2    2   In general, please rate how well you carry out your usual social activities and roles. (This includes activities at home, at work and in your community, and responsibilities as a parent, child, spouse, employee, friend, etc.) 2 3 1    1 2    2    2    2   To what extent are you able to carry out your everyday physical activities such as walking, climbing stairs, carrying groceries, or moving a chair? 4 3 2    2 2    2    2    2   In the past 7 days, how often have you been bothered by emotional problems such as feeling anxious, depressed, or irritable? 5 5 5    5 3    3    3    3   In the past 7 days, how would you rate your fatigue on average? 3 3 3    3 3    3    3    3   In the past 7 days, how would you rate your pain on average, where 0 means no pain, and 10 means worst imaginable pain? 4 4 4    4 3    3    3    3   Global Mental Health Score 6 5 6    6 9    9    9    9   Global Physical Health Score 12 12 10    10 11    11    11    11   PROMIS TOTAL - SUBSCORES 18  17 16    16 20    20    20    20     PROMIS 10-Global Health (only subscores and total score):       2/15/2023    11:36 AM 3/9/2023    12:46 PM 8/3/2023     6:21 PM 11/15/2023    12:48 PM   PROMIS-10 Scores Only   Global Mental Health Score 6 5 6    6 9    9    9    9   Global Physical Health Score 12 12 10    10 11    11    11    11   PROMIS TOTAL - SUBSCORES 18 17 16    16 20    20    20    20       Care Plan review completed: Yes    Medication Review:  No changes to current psychiatric medication(s)    Medication Compliance:  Yes    Changes in Health Issues:   None reported    Chemical Use Review:   Substance Use: Chemical use reviewed, no active concerns identified      Tobacco Use: No current tobacco use.      Assessment: Current Emotional / Mental Status (status of significant symptoms):  Risk status (Self / Other harm or suicidal ideation)  Patient denies a history of suicidal ideation, suicide attempts, self-injurious behavior, homicidal ideation, homicidal behavior and and other safety concerns  Patient denies current fears or concerns for personal safety.  Patient denies current or recent suicidal ideation or behaviors.  Patient denies current or recent homicidal ideation or behaviors.  Patient denies current or recent self injurious behavior or ideation.  Patient denies other safety concerns.  A safety and risk management plan has not been developed at this time, however patient was encouraged to call [place] [phone] should there be a change in any of these risk factors.    Appearance:   Appropriate   Eye Contact:   Good   Psychomotor Behavior: Normal   Attitude:   Cooperative   Orientation:   All  Speech   Rate / Production: Normal    Volume:  Normal   Mood:    Depressed   Affect:    Congruent   Thought Content:  Clear   Thought Form:  Coherent  Logical   Insight:    Good     Diagnoses:  1. Depression, major, recurrent, mild (H24)    2. Generalized anxiety disorder            Collateral Reports  Completed:  Collaborated with [facility] team    Plan: (Homework, other):  Patient was given information about behavioral services and encouraged to schedule a follow up appointment with the clinic [facility] in 1 month.  Lc Cheatham was also given information about mental health symptoms and treatment options .  CD Recommendations: No indications of CD issues. Marimar Webb [facility]         ______________________________________________________________________    Integrated Primary Care Behavioral Health Treatment Plan    Patient's Name: Shiloh Cheatham  YOB: 2000    Date of Creation: 4/27/2023  Date Treatment Plan Last Reviewed/Revised: 10/27/2023    DSM5 Diagnoses: 296.32 (F33.1) Major Depressive Disorder, Recurrent Episode, Moderate _ and With anxious distress  Psychosocial / Contextual Factors: hx of trauma, TBI  PROMIS (reviewed every 90 days):   The following assessments were completed by patient for this visit:  PROMIS 10-Global Health (only subscores and total score):       2/15/2023    11:36 AM 3/9/2023    12:46 PM 8/3/2023     6:21 PM 11/15/2023    12:48 PM   PROMIS-10 Scores Only   Global Mental Health Score 6 5 6    6 9    9    9    9   Global Physical Health Score 12 12 10    10 11    11    11    11   PROMIS TOTAL - SUBSCORES 18 17 16    16 20    20    20    20       Referral / Collaboration:  Collaborated with [facility] team.    Anticipated number of session for this episode of care: 10-12  Anticipation frequency of session: Monthly  Anticipated Duration of each session: 16-37 minutes  Treatment plan will be reviewed in 90 days or when goals have been changed.       MeasurableTreatment Goal(s) related to diagnosis / functional impairment(s)  Goal 1: Patient will experience improved mood    I will know I've met my goal when more manageable anxiety, panic attacks going away, mood stability, alleviated depression.      Objective #A (Patient Action)    Patient will Decrease frequency and intensity of  feeling down, depressed, hopeless.  Status: Continued - Date(s):10/27/2023     Intervention(s)  Therapist will teach emotional regulation skills. to manage depression .      Patient has reviewed and agreed to the above plan.      LEATHA Reyes  November 15, 2023

## 2024-01-10 NOTE — PROGRESS NOTES
"[facility] Psychiatry Services [facility]  January 11 2024      Behavioral Health Clinician Progress Note    Patient Name: Shiloh Cheatham \"Lc\" (they/them)          Service Type:  Individual      Service Location:   [facility] / Email (patient reached)     Session Start Time: 1058 am  Session End Time: 1116 am      Session Length: 16 - 37      Attendees: Patient     Service Modality:  Video Visit:      Provider verified identity through the following two step process.  Patient provided:  Patient photo and Patient is known previously to provider    Telemedicine Visit: The patient's condition can be safely assessed and treated via synchronous audio and visual telemedicine encounter.      Reason for Telemedicine Visit: Patient convenience (e.g. access to timely appointments / distance to available provider)    Originating Site (Patient Location): Patient's home    Distant Site (Provider Location): Provider Remote Setting- Home Office    Consent:  The patient/guardian has verbally consented to: the potential risks and benefits of telemedicine (video visit) versus in person care; bill my insurance or make self-payment for services provided; and responsibility for payment of non-covered services.     Patient would like the video invitation sent by:  My Chart    Mode of Communication:  Video Conference via [facility]    Distant Location (Provider):  Off-site    As the provider I attest to compliance with applicable laws and regulations related to telemedicine.    Visit Activities (Refresh list every visit): [facility] Only    Diagnostic Assessment Date: 3/10/2023  Treatment Plan Review Date: 01/27/2024  See Flowsheets for today's PHQ-9 and MYNOR-7 results  Previous PHQ-9:       11/15/2023    12:47 PM 11/15/2023     2:20 PM 1/11/2024     3:37 AM   PHQ-9 SCORE   PHQ-9 Total Score [facility] 12 (Moderate depression)  14 (Moderate depression)   PHQ-9 Total Score 12    12 10 14     Previous MYNOR-7:       4/11/2023     8:46 " "AM 4/24/2023     7:13 PM 6/2/2023     3:08 AM   MYNOR-7 SCORE   Total Score  20 (severe anxiety) 11 (moderate anxiety)   Total Score 18 20 11    11       AMANDA LEVEL:       No data to display                DATA  Extended Session (60+ minutes): No  Interactive Complexity: No  Crisis: No  [facility] Patient: No    Treatment Objective(s) Addressed in This Session:  Target Behavior(s):  improve mood stability    Depressed Mood: Decrease frequency and intensity of feeling down, depressed, hopeless    Current Stressors / Issues:   update: Pt reports that holidays are challenging, hard. [facility] explored. Also, Winter is the anniversary of traumatic events. They report that those have easier to deal with than they expected but not \"easy\".Pt reports family conflict, their grandparents are in failing health. They came out to them as trans. They were accepting but sometimes forget to use correct pronouns. It has been difficult to see their grandparents failing health. Pt and their family have different values in some areas which creates some stress. They find that it is easier to not share much with family in order to avoid unnecessary conflict. Their pain is worse in the Winter due to cold.  They've have been social to reduce isolation. They are working more hours in but fewer days which they prefer.   Stresses: busy work, holidays  Appetite: unremarkable  Sleep: unremarkable  Therapy: ADHD testing scheduled for Feb. 2024, pt is scheduled to see Luz Elena [facility]ley at [facility] in March. Pt has also been trying to find someone sooner  RANDAL: No  Preg: No  Interventions: [facility] reviewed skills to manage depression such as increasing socialization, nighat with friends  Most important: medication update      Progress on Treatment Objective(s) / Homework:  Worsening - ACTION (Actively working towards change); Intervened by reinforcing change plan / affirming steps taken    Motivational Interviewing    MI Intervention: Co-Developed Goal: improve mood " stability, Expressed Empathy/Understanding, Open-ended questions, Reflections: simple and complex, Change talk (evoked) and Reframe     Change Talk Expressed by the Patient: Desire to change Ability to change Reasons to change Need to change Committment to change Activation Taking steps    Provider Response to Change Talk: E - Evoked more info from patient about behavior change, A - Affirmed patient's thoughts, decisions, or attempts at behavior change, R - Reflected patient's change talk and S - Summarized patient's change talk statements   The following assessments were completed by patient for this visit:  PROMIS 10-Global Health (all questions and answers displayed):       2/15/2023    11:36 AM 3/9/2023    12:46 PM 8/3/2023     6:21 PM 11/15/2023    12:48 PM   PROMIS 10   In general, would you say your health is: Fair Good Fair Good   In general, would you say your quality of life is: Fair Poor Fair Good   In general, how would you rate your physical health? Fair Good Fair Fair   In general, how would you rate your mental health, including your mood and your ability to think? Poor Poor Poor Poor   In general, how would you rate your satisfaction with your social activities and relationships? Fair Fair Fair Fair   In general, please rate how well you carry out your usual social activities and roles Fair Good Poor Fair   To what extent are you able to carry out your everyday physical activities such as walking, climbing stairs, carrying groceries, or moving a chair? Mostly Moderately A little A little   In the past 7 days, how often have you been bothered by emotional problems such as feeling anxious, depressed, or irritable? Always Always Always Sometimes   In the past 7 days, how would you rate your fatigue on average? Moderate Moderate Moderate Moderate   In the past 7 days, how would you rate your pain on average, where 0 means no pain, and 10 means worst imaginable pain? 4 4 4 3   In general, would you say  your health is: 2 3 2 3   In general, would you say your quality of life is: 2 1 2 3   In general, how would you rate your physical health? 2 3 2 2   In general, how would you rate your mental health, including your mood and your ability to think? 1 1 1 1   In general, how would you rate your satisfaction with your social activities and relationships? 2 2 2 2   In general, please rate how well you carry out your usual social activities and roles. (This includes activities at home, at work and in your community, and responsibilities as a parent, child, spouse, employee, friend, etc.) 2 3 1 2   To what extent are you able to carry out your everyday physical activities such as walking, climbing stairs, carrying groceries, or moving a chair? 4 3 2 2   In the past 7 days, how often have you been bothered by emotional problems such as feeling anxious, depressed, or irritable? 5 5 5 3   In the past 7 days, how would you rate your fatigue on average? 3 3 3 3   In the past 7 days, how would you rate your pain on average, where 0 means no pain, and 10 means worst imaginable pain? 4 4 4 3   Global Mental Health Score 6 5 6    6 9    9    9    9   Global Physical Health Score 12 12 10    10 11    11    11    11   PROMIS TOTAL - SUBSCORES 18 17 16    16 20    20    20    20     PROMIS 10-Global Health (only subscores and total score):       2/15/2023    11:36 AM 3/9/2023    12:46 PM 8/3/2023     6:21 PM 11/15/2023    12:48 PM   PROMIS-10 Scores Only   Global Mental Health Score 6 5 6    6 9    9    9    9   Global Physical Health Score 12 12 10    10 11    11    11    11   PROMIS TOTAL - SUBSCORES 18 17 16    16 20    20    20    20       Care Plan review completed: Yes    Medication Review:  No changes to current psychiatric medication(s)    Medication Compliance:  Yes    Changes in Health Issues:   None reported    Chemical Use Review:   Substance Use: Chemical use reviewed, no active concerns identified      Tobacco Use: No  current tobacco use.      Assessment: Current Emotional / Mental Status (status of significant symptoms):  Risk status (Self / Other harm or suicidal ideation)  Patient denies a history of suicidal ideation, suicide attempts, self-injurious behavior, homicidal ideation, homicidal behavior and and other safety concerns  Patient denies current fears or concerns for personal safety.  Patient denies current or recent suicidal ideation or behaviors.  Patient denies current or recent homicidal ideation or behaviors.  Patient denies current or recent self injurious behavior or ideation.  Patient denies other safety concerns.  A safety and risk management plan has not been developed at this time, however patient was encouraged to call [place] [phone] should there be a change in any of these risk factors.    Appearance:   Appropriate   Eye Contact:   Good   Psychomotor Behavior: Normal   Attitude:   Cooperative   Orientation:   All  Speech   Rate / Production: Normal    Volume:  Normal   Mood:    Depressed   Affect:    Congruent   Thought Content:  Clear   Thought Form:  Coherent  Logical   Insight:    Good     Diagnoses:  1. Depression, major, recurrent, moderate (H)              Collateral Reports Completed:  Collaborated with [facility] team    Plan: (Homework, other):  Patient was given information about behavioral services and encouraged to schedule a follow up appointment with the clinic [facility] in 1 month.  Lc Cheatham was also given information about mental health symptoms and treatment options .  CD Recommendations: No indications of CD issues. Marimar Webb [facility]         ______________________________________________________________________    Integrated Primary Care Behavioral Health Treatment Plan    Patient's Name: Shiloh Cheatham  YOB: 2000    Date of Creation: 4/27/2023  Date Treatment Plan Last Reviewed/Revised: 10/27/2023    DSM5 Diagnoses: 296.32 (F33.1) Major Depressive Disorder,  Recurrent Episode, Moderate _ and With anxious distress  Psychosocial / Contextual Factors: hx of trauma, TBI  PROMIS (reviewed every 90 days):   The following assessments were completed by patient for this visit:  PROMIS 10-Global Health (only subscores and total score):       2/15/2023    11:36 AM 3/9/2023    12:46 PM 8/3/2023     6:21 PM 11/15/2023    12:48 PM   PROMIS-10 Scores Only   Global Mental Health Score 6 5 6    6 9    9    9    9   Global Physical Health Score 12 12 10    10 11    11    11    11   PROMIS TOTAL - SUBSCORES 18 17 16    16 20    20    20    20       Referral / Collaboration:  Collaborated with [facility] team.    Anticipated number of session for this episode of care: 10-12  Anticipation frequency of session: Monthly  Anticipated Duration of each session: 16-37 minutes  Treatment plan will be reviewed in 90 days or when goals have been changed.       MeasurableTreatment Goal(s) related to diagnosis / functional impairment(s)  Goal 1: Patient will experience improved mood    I will know I've met my goal when more manageable anxiety, panic attacks going away, mood stability, alleviated depression.      Objective #A (Patient Action)    Patient will Decrease frequency and intensity of feeling down, depressed, hopeless.  Status: Continued - Date(s):10/27/2023     Intervention(s)  Therapist will teach emotional regulation skills. to manage depression .      Patient has reviewed and agreed to the above plan.      Marimar Webb, LEATHA  January 11, 2024

## 2024-01-11 ENCOUNTER — VIRTUAL VISIT (OUTPATIENT)
Dept: BEHAVIORAL HEALTH | Facility: CLINIC | Age: 24
End: 2024-01-11
Payer: COMMERCIAL

## 2024-01-11 ENCOUNTER — VIRTUAL VISIT (OUTPATIENT)
Dept: PSYCHIATRY | Facility: CLINIC | Age: 24
End: 2024-01-11
Payer: COMMERCIAL

## 2024-01-11 DIAGNOSIS — F33.1 DEPRESSION, MAJOR, RECURRENT, MODERATE (H): Primary | ICD-10-CM

## 2024-01-11 DIAGNOSIS — F64.9 GENDER DYSPHORIA: ICD-10-CM

## 2024-01-11 DIAGNOSIS — F60.3 BORDERLINE PERSONALITY DISORDER (H): Primary | ICD-10-CM

## 2024-01-11 DIAGNOSIS — F43.10 PTSD (POST-TRAUMATIC STRESS DISORDER): ICD-10-CM

## 2024-01-11 PROCEDURE — 99214 OFFICE O/P EST MOD 30 MIN: CPT | Mod: 95 | Performed by: NURSE PRACTITIONER

## 2024-01-11 PROCEDURE — 90832 PSYTX W PT 30 MINUTES: CPT | Mod: 95 | Performed by: SOCIAL WORKER

## 2024-01-11 RX ORDER — PROPRANOLOL HYDROCHLORIDE 20 MG/1
20 TABLET ORAL 2 TIMES DAILY PRN
Qty: 60 TABLET | Refills: 1 | Status: SHIPPED | OUTPATIENT
Start: 2024-01-11

## 2024-01-11 RX ORDER — SERTRALINE HYDROCHLORIDE 100 MG/1
200 TABLET, FILM COATED ORAL DAILY
Qty: 60 TABLET | Refills: 1 | Status: SHIPPED | OUTPATIENT
Start: 2024-01-11 | End: 2024-05-08

## 2024-01-11 ASSESSMENT — PATIENT HEALTH QUESTIONNAIRE - PHQ9
SUM OF ALL RESPONSES TO PHQ QUESTIONS 1-9: 14
10. IF YOU CHECKED OFF ANY PROBLEMS, HOW DIFFICULT HAVE THESE PROBLEMS MADE IT FOR YOU TO DO YOUR WORK, TAKE CARE OF THINGS AT HOME, OR GET ALONG WITH OTHER PEOPLE: SOMEWHAT DIFFICULT
SUM OF ALL RESPONSES TO PHQ QUESTIONS 1-9: 14

## 2024-01-11 NOTE — NURSING NOTE
Is the patient currently in the state of [place]? YES    Visit mode:VIDEO    If the visit is dropped, the patient can be reconnected by: VIDEO VISIT: Text to cell phone:   Telephone Information:   Mobile [phone]       Will anyone else be joining the visit? NO  (If patient encounters technical issues they should call [phone]    How would you like to obtain your AVS? MyChart    Are changes needed to the allergy or medication list? Pt stated no changes to allergies and Pt stated no med changes    Reason for visit: DEREK PISANO

## 2024-01-11 NOTE — ASSESSMENT & PLAN NOTE
Overall doing well on sertraline 200 mg  does note sexual side effects but would like to continue this dose due to the efficacy.  ADHD testing next week  will follow-up after she gets results in early March
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
## 2024-01-15 ENCOUNTER — VIRTUAL VISIT (OUTPATIENT)
Dept: PSYCHOLOGY | Facility: CLINIC | Age: 24
End: 2024-01-15
Payer: COMMERCIAL

## 2024-01-15 DIAGNOSIS — F25.1 SCHIZOAFFECTIVE DISORDER, DEPRESSIVE TYPE (H): Primary | ICD-10-CM

## 2024-01-15 DIAGNOSIS — F60.3 BORDERLINE PERSONALITY DISORDER (H): ICD-10-CM

## 2024-01-15 DIAGNOSIS — F41.1 GENERALIZED ANXIETY DISORDER: ICD-10-CM

## 2024-01-15 DIAGNOSIS — F33.1 MAJOR DEPRESSIVE DISORDER, RECURRENT EPISODE, MODERATE (H): ICD-10-CM

## 2024-01-15 PROCEDURE — 90791 PSYCH DIAGNOSTIC EVALUATION: CPT | Mod: 95 | Performed by: PSYCHOLOGIST

## 2024-01-15 ASSESSMENT — ANXIETY QUESTIONNAIRES
5. BEING SO RESTLESS THAT IT IS HARD TO SIT STILL: SEVERAL DAYS
IF YOU CHECKED OFF ANY PROBLEMS ON THIS QUESTIONNAIRE, HOW DIFFICULT HAVE THESE PROBLEMS MADE IT FOR YOU TO DO YOUR WORK, TAKE CARE OF THINGS AT HOME, OR GET ALONG WITH OTHER PEOPLE: SOMEWHAT DIFFICULT
7. FEELING AFRAID AS IF SOMETHING AWFUL MIGHT HAPPEN: NOT AT ALL
GAD7 TOTAL SCORE: 6
2. NOT BEING ABLE TO STOP OR CONTROL WORRYING: SEVERAL DAYS
1. FEELING NERVOUS, ANXIOUS, OR ON EDGE: SEVERAL DAYS
3. WORRYING TOO MUCH ABOUT DIFFERENT THINGS: NOT AT ALL
6. BECOMING EASILY ANNOYED OR IRRITABLE: SEVERAL DAYS
GAD7 TOTAL SCORE: 6

## 2024-01-15 ASSESSMENT — PATIENT HEALTH QUESTIONNAIRE - PHQ9
SUM OF ALL RESPONSES TO PHQ QUESTIONS 1-9: 11
10. IF YOU CHECKED OFF ANY PROBLEMS, HOW DIFFICULT HAVE THESE PROBLEMS MADE IT FOR YOU TO DO YOUR WORK, TAKE CARE OF THINGS AT HOME, OR GET ALONG WITH OTHER PEOPLE: SOMEWHAT DIFFICULT
SUM OF ALL RESPONSES TO PHQ QUESTIONS 1-9: 11
5. POOR APPETITE OR OVEREATING: MORE THAN HALF THE DAYS

## 2024-01-15 ASSESSMENT — COLUMBIA-SUICIDE SEVERITY RATING SCALE - C-SSRS
6. HAVE YOU EVER DONE ANYTHING, STARTED TO DO ANYTHING, OR PREPARED TO DO ANYTHING TO END YOUR LIFE?: NO
ATTEMPT LIFETIME: NO
TOTAL  NUMBER OF ABORTED OR SELF INTERRUPTED ATTEMPTS LIFETIME: NO
TOTAL  NUMBER OF INTERRUPTED ATTEMPTS LIFETIME: NO
1. HAVE YOU WISHED YOU WERE DEAD OR WISHED YOU COULD GO TO SLEEP AND NOT WAKE UP?: NO

## 2024-01-15 NOTE — Clinical Note
Nayan Coe,  I met with Lc today about the ADHD evaluation and told them I was going to consult with you about next steps. I have some concerns about being able to detect ADHD given other active diagnoses (I.e., PTSD, Borderline Personality, Schizoaffective Disorder) and TBI in 2019. Before continuing with ADHD2 visit, I wanted to get your thoughts on their case. Do you think ADHD might be present? Would you incorporate changes to your medication regimen based on the findings from such an evaluation? (I'm not sure how stimulants might impact a schizoaffective condition) - Your thoughts would help in guiding our next steps. Do we think a neuropsych eval would be relevant (more extensive test battery) given history with TBI? Lc might have had an eval in 2019, so comparing current results to that could be meaningful.   Thanks for weighing in!  Nitza Odell, PhD, LP

## 2024-01-15 NOTE — PROGRESS NOTES
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 Prevent Deterioration.  6. Likely consequences of symptoms if not treated: issues at home/work.  7. Client strengths include:  educated, employed, goal-focused, good listener, has a previous history of therapy, insightful, intelligent, motivated, open to learning, open to suggestions / feedback, support of family, friends and providers, wants to learn, willing to ask questions, willing to relate to others, and work history .     Recommendations:     1. Plan for Safety and Risk Management:   Safety and Risk: Recommended that patient call 911 or go to the local ED should there be a change in any of these risk factors..          Report to child / adult protection services was NA.      4. Resources/Service Plan:    services are not indicated.   Modifications to assist communication are not indicated.   Additional disability accommodations are not indicated.      5. Collaboration:   Collaboration / coordination of treatment will be initiated with the following  support professionals: primary care physician, outpatient therapist, and psychiatry.      6.  Referrals:   The following referral(s) will be initiated:  TBD if neuropsych eval is warranted given Client's history with  TBI .       A Release of Information has been obtained for the following:  NA .     Clinical Substantiation/medical necessity for the above recommendations:  Medical necessity criteria is warranted in order to: Measure a psychological disorder and its severity and functional impairment to determine psychiatric diagnosis when a mental illness is suspected, or to achieve a differential diagnosis from a range of medical/psychological disorders that present with similar constellations of symptoms (e.g., determination and measurement of anxiety severity and impact in the presence of ongoing asthma or heart disease), Perform symptom measurement to objectively measure treatment effectiveness and/or determine the need to refer for pharmacological treatment or other medical evaluation (e.g., based on severity and chronicity of symptoms), and Evaluate primary symptoms of impaired attention and concentration that can occur in many neurological and psychiatric conditions. .    7. RANDAL:    RANDAL:  Discussed the general effects of drugs and alcohol on health and well-being. Provider gave patient printed information about the  effects of chemical use on their health and well being. Recommendations:  NA .     8. Records:   These were reviewed at time of assessment.   Information in this assessment was obtained from the medical record and  provided by patient who is a good historian.    Patient will have open access to their mental health medical record.    9.   Interactive Complexity: No    10. Safety Plan:  Patient denied any current/recent/lifetime history of suicidal ideation and/or behaviors.  No safety plan indicated at this time.     Provider Name/ Credentials:  Nitza Odell, PhD, LP  January 15, 2024

## 2024-01-22 ASSESSMENT — ANXIETY QUESTIONNAIRES
GAD7 TOTAL SCORE: 8
4. TROUBLE RELAXING: SEVERAL DAYS
7. FEELING AFRAID AS IF SOMETHING AWFUL MIGHT HAPPEN: NOT AT ALL
GAD7 TOTAL SCORE: 8
3. WORRYING TOO MUCH ABOUT DIFFERENT THINGS: SEVERAL DAYS
1. FEELING NERVOUS, ANXIOUS, OR ON EDGE: SEVERAL DAYS
6. BECOMING EASILY ANNOYED OR IRRITABLE: MORE THAN HALF THE DAYS
2. NOT BEING ABLE TO STOP OR CONTROL WORRYING: SEVERAL DAYS
5. BEING SO RESTLESS THAT IT IS HARD TO SIT STILL: MORE THAN HALF THE DAYS
8. IF YOU CHECKED OFF ANY PROBLEMS, HOW DIFFICULT HAVE THESE MADE IT FOR YOU TO DO YOUR WORK, TAKE CARE OF THINGS AT HOME, OR GET ALONG WITH OTHER PEOPLE?: SOMEWHAT DIFFICULT
7. FEELING AFRAID AS IF SOMETHING AWFUL MIGHT HAPPEN: NOT AT ALL
IF YOU CHECKED OFF ANY PROBLEMS ON THIS QUESTIONNAIRE, HOW DIFFICULT HAVE THESE PROBLEMS MADE IT FOR YOU TO DO YOUR WORK, TAKE CARE OF THINGS AT HOME, OR GET ALONG WITH OTHER PEOPLE: SOMEWHAT DIFFICULT
GAD7 TOTAL SCORE: 8

## 2024-01-22 ASSESSMENT — PATIENT HEALTH QUESTIONNAIRE - PHQ9
SUM OF ALL RESPONSES TO PHQ QUESTIONS 1-9: 12
10. IF YOU CHECKED OFF ANY PROBLEMS, HOW DIFFICULT HAVE THESE PROBLEMS MADE IT FOR YOU TO DO YOUR WORK, TAKE CARE OF THINGS AT HOME, OR GET ALONG WITH OTHER PEOPLE: SOMEWHAT DIFFICULT
SUM OF ALL RESPONSES TO PHQ QUESTIONS 1-9: 12

## 2024-01-23 ENCOUNTER — VIRTUAL VISIT (OUTPATIENT)
Dept: PSYCHOLOGY | Facility: CLINIC | Age: 24
End: 2024-01-23
Payer: COMMERCIAL

## 2024-01-23 DIAGNOSIS — Z79.899 TRANSGENDER PERSON ON HORMONE THERAPY: ICD-10-CM

## 2024-01-23 DIAGNOSIS — F60.3 BORDERLINE PERSONALITY DISORDER (H): ICD-10-CM

## 2024-01-23 DIAGNOSIS — F64.0 TRANSGENDER PERSON ON HORMONE THERAPY: ICD-10-CM

## 2024-01-23 DIAGNOSIS — F41.1 GENERALIZED ANXIETY DISORDER: ICD-10-CM

## 2024-01-23 DIAGNOSIS — F33.1 MAJOR DEPRESSIVE DISORDER, RECURRENT EPISODE, MODERATE (H): Primary | ICD-10-CM

## 2024-01-23 DIAGNOSIS — F25.1 SCHIZOAFFECTIVE DISORDER, DEPRESSIVE TYPE (H): ICD-10-CM

## 2024-01-23 DIAGNOSIS — F43.10 PTSD (POST-TRAUMATIC STRESS DISORDER): ICD-10-CM

## 2024-01-23 PROCEDURE — 90791 PSYCH DIAGNOSTIC EVALUATION: CPT | Mod: 95 | Performed by: SOCIAL WORKER

## 2024-01-24 DIAGNOSIS — G31.84 MILD NEUROCOGNITIVE DISORDER: Primary | ICD-10-CM

## 2024-01-28 PROBLEM — F64.0 TRANSGENDER PERSON ON HORMONE THERAPY: Status: ACTIVE | Noted: 2024-01-28

## 2024-01-28 PROBLEM — F41.1 GENERALIZED ANXIETY DISORDER: Status: ACTIVE | Noted: 2024-01-28

## 2024-01-28 PROBLEM — F33.1 MAJOR DEPRESSIVE DISORDER, RECURRENT EPISODE, MODERATE (H): Status: ACTIVE | Noted: 2024-01-28

## 2024-01-28 PROBLEM — Z79.899 TRANSGENDER PERSON ON HORMONE THERAPY: Status: ACTIVE | Noted: 2024-01-28

## 2024-01-28 ASSESSMENT — COLUMBIA-SUICIDE SEVERITY RATING SCALE - C-SSRS
1. HAVE YOU WISHED YOU WERE DEAD OR WISHED YOU COULD GO TO SLEEP AND NOT WAKE UP?: YES
ATTEMPT LIFETIME: NO
2. HAVE YOU ACTUALLY HAD ANY THOUGHTS OF KILLING YOURSELF?: NO
TOTAL  NUMBER OF ABORTED OR SELF INTERRUPTED ATTEMPTS LIFETIME: NO
6. HAVE YOU EVER DONE ANYTHING, STARTED TO DO ANYTHING, OR PREPARED TO DO ANYTHING TO END YOUR LIFE?: NO
TOTAL  NUMBER OF INTERRUPTED ATTEMPTS LIFETIME: NO
REASONS FOR IDEATION LIFETIME: MOSTLY TO END OR STOP THE PAIN (YOU COULDN'T GO ON LIVING WITH THE PAIN OR HOW YOU WERE FEELING)
1. IN THE PAST MONTH, HAVE YOU WISHED YOU WERE DEAD OR WISHED YOU COULD GO TO SLEEP AND NOT WAKE UP?: NO
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
## 2024-01-28 NOTE — PATIENT INSTRUCTIONS
Client will schedule . If too difficult to get in to be seen, will be transferred to therapist with more availability

## 2024-02-22 ENCOUNTER — TELEPHONE (OUTPATIENT)
Dept: PSYCHIATRY | Facility: CLINIC | Age: 24
End: 2024-02-22

## 2024-02-22 NOTE — TELEPHONE ENCOUNTER
[facility]          Patient Legal Name: Shiloh Cheatham  Preferred Name: Lc DE LA GARZA/Age:  2000 (23 year old)      Intervention: Pt was referred by Carolin Larkin to see psychiatrist at our clinic.      Status of Referral: Scheduled, MyChart message sent to pt to confirm that appt is in person. Waiting on response from pt to confirm.      Krystina De Leon, 2024    HC

## 2024-03-07 ENCOUNTER — TELEPHONE (OUTPATIENT)
Dept: FAMILY MEDICINE | Facility: CLINIC | Age: 24
End: 2024-03-07
Payer: COMMERCIAL

## 2024-03-20 ENCOUNTER — VIRTUAL VISIT (OUTPATIENT)
Dept: PSYCHOLOGY | Facility: CLINIC | Age: 24
End: 2024-03-20
Payer: COMMERCIAL

## 2024-03-20 DIAGNOSIS — F43.10 PTSD (POST-TRAUMATIC STRESS DISORDER): ICD-10-CM

## 2024-03-20 DIAGNOSIS — F60.3 BORDERLINE PERSONALITY DISORDER (H): ICD-10-CM

## 2024-03-20 DIAGNOSIS — F33.1 MAJOR DEPRESSIVE DISORDER, RECURRENT EPISODE, MODERATE (H): Primary | ICD-10-CM

## 2024-03-20 DIAGNOSIS — Z79.899 TRANSGENDER PERSON ON HORMONE THERAPY: ICD-10-CM

## 2024-03-20 DIAGNOSIS — F41.1 GENERALIZED ANXIETY DISORDER: ICD-10-CM

## 2024-03-20 DIAGNOSIS — F25.1 SCHIZOAFFECTIVE DISORDER, DEPRESSIVE TYPE (H): ICD-10-CM

## 2024-03-20 DIAGNOSIS — F64.0 TRANSGENDER PERSON ON HORMONE THERAPY: ICD-10-CM

## 2024-03-20 PROCEDURE — 90837 PSYTX W PT 60 MINUTES: CPT | Mod: 95 | Performed by: SOCIAL WORKER

## 2024-03-20 ASSESSMENT — ANXIETY QUESTIONNAIRES
1. FEELING NERVOUS, ANXIOUS, OR ON EDGE: NEARLY EVERY DAY
3. WORRYING TOO MUCH ABOUT DIFFERENT THINGS: MORE THAN HALF THE DAYS
GAD7 TOTAL SCORE: 12
IF YOU CHECKED OFF ANY PROBLEMS ON THIS QUESTIONNAIRE, HOW DIFFICULT HAVE THESE PROBLEMS MADE IT FOR YOU TO DO YOUR WORK, TAKE CARE OF THINGS AT HOME, OR GET ALONG WITH OTHER PEOPLE: SOMEWHAT DIFFICULT
2. NOT BEING ABLE TO STOP OR CONTROL WORRYING: MORE THAN HALF THE DAYS
5. BEING SO RESTLESS THAT IT IS HARD TO SIT STILL: SEVERAL DAYS
7. FEELING AFRAID AS IF SOMETHING AWFUL MIGHT HAPPEN: SEVERAL DAYS
GAD7 TOTAL SCORE: 12
6. BECOMING EASILY ANNOYED OR IRRITABLE: MORE THAN HALF THE DAYS
7. FEELING AFRAID AS IF SOMETHING AWFUL MIGHT HAPPEN: SEVERAL DAYS
8. IF YOU CHECKED OFF ANY PROBLEMS, HOW DIFFICULT HAVE THESE MADE IT FOR YOU TO DO YOUR WORK, TAKE CARE OF THINGS AT HOME, OR GET ALONG WITH OTHER PEOPLE?: SOMEWHAT DIFFICULT
4. TROUBLE RELAXING: SEVERAL DAYS
GAD7 TOTAL SCORE: 12

## 2024-03-20 ASSESSMENT — PATIENT HEALTH QUESTIONNAIRE - PHQ9
SUM OF ALL RESPONSES TO PHQ QUESTIONS 1-9: 17
SUM OF ALL RESPONSES TO PHQ QUESTIONS 1-9: 17
10. IF YOU CHECKED OFF ANY PROBLEMS, HOW DIFFICULT HAVE THESE PROBLEMS MADE IT FOR YOU TO DO YOUR WORK, TAKE CARE OF THINGS AT HOME, OR GET ALONG WITH OTHER PEOPLE: SOMEWHAT DIFFICULT
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
## 2024-03-25 ENCOUNTER — PATIENT OUTREACH (OUTPATIENT)
Dept: CARE COORDINATION | Facility: CLINIC | Age: 24
End: 2024-03-25
Payer: COMMERCIAL

## 2024-03-25 NOTE — LETTER
[facility]  [address]  [facility] [address]    March 26, 2024    Shiloh Cheatham  [address]   [facility] [address]      Dear Shiloh,    I am a clinic community health worker who works with Beau Roldan MD with the [facility]. I wanted to introduce myself and provide you with my contact information for you to be able to call me with any questions or concerns. Below is a description of clinic care coordination and how I can further assist you.       The clinic care coordination team is made up of a registered nurse, , financial resource worker and community health worker who understand the health care system. The goal of clinic care coordination is to help you manage your health and improve access to the health care system. Our team works alongside your provider to assist you in determining your health and social needs. We can help you obtain health care and community resources, providing you with necessary information and education. We can work with you through any barriers and develop a care plan that helps coordinate and strengthen the communication between you and your care team.  Our services are voluntary and are offered without charge to you personally.    Please feel free to contact me with any questions or concerns regarding care coordination and what we can offer.      We are focused on providing you with the highest-quality healthcare experience possible.      Sincerely,     Lizy Fowler  Community Health Worker  [facility]  [phone]

## 2024-03-25 NOTE — PROGRESS NOTES
Clinic Care Coordination Contact  [facility]/Voicemail    Chart Review: Referral from [facility], Luz Elena Pressley, on 3/24/24:  Financial Support - food, insurance, medication affordability    Clinical Data: Care Coordinator Outreach    Outreach Documentation Number of Outreach Attempt   3/25/2024   9:17 AM 1       Left message on patient's voicemail with call back information and requested return call.    Plan: Care Coordinator will try to reach patient again in 1-2 business days.    Lizy Fowler  Community Health Worker  [facility]  [phone]

## 2024-03-26 NOTE — PROGRESS NOTES
Clinic Care Coordination Contact  [facility]/Voicemail      Chart Review: Referral from [facility], Luz Elena Pressley, on 3/24/24:  Financial Support - food, insurance, medication affordability      Clinical Data: Care Coordinator Outreach    Outreach Documentation Number of Outreach Attempt   3/25/2024   9:17 AM 1   3/26/2024   4:06 PM 2       Left message on patient's voicemail with call back information and requested return call.      Plan: Care Coordinator will send care coordination introduction letter with care coordinator contact information and explanation of care coordination services via [employer]. Care Coordinator will do no further outreaches at this time.      Lizy Fowler  Community Health Worker  [facility]  [phone]

## 2024-03-28 ENCOUNTER — VIRTUAL VISIT (OUTPATIENT)
Dept: PSYCHOLOGY | Facility: CLINIC | Age: 24
End: 2024-03-28
Payer: COMMERCIAL

## 2024-03-28 DIAGNOSIS — F41.1 GENERALIZED ANXIETY DISORDER: ICD-10-CM

## 2024-03-28 DIAGNOSIS — F64.0 TRANSGENDER PERSON ON HORMONE THERAPY: ICD-10-CM

## 2024-03-28 DIAGNOSIS — F43.10 PTSD (POST-TRAUMATIC STRESS DISORDER): ICD-10-CM

## 2024-03-28 DIAGNOSIS — Z79.899 TRANSGENDER PERSON ON HORMONE THERAPY: ICD-10-CM

## 2024-03-28 DIAGNOSIS — F25.1 SCHIZOAFFECTIVE DISORDER, DEPRESSIVE TYPE (H): ICD-10-CM

## 2024-03-28 DIAGNOSIS — F60.3 BORDERLINE PERSONALITY DISORDER (H): ICD-10-CM

## 2024-03-28 DIAGNOSIS — F33.1 MAJOR DEPRESSIVE DISORDER, RECURRENT EPISODE, MODERATE (H): Primary | ICD-10-CM

## 2024-03-28 PROCEDURE — 90837 PSYTX W PT 60 MINUTES: CPT | Mod: 95 | Performed by: SOCIAL WORKER
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
## 2024-04-28 DIAGNOSIS — R06.2 WHEEZING: ICD-10-CM

## 2024-04-29 RX ORDER — ALBUTEROL SULFATE 90 UG/1
AEROSOL, METERED RESPIRATORY (INHALATION)
Qty: 8.5 G | Refills: 0 | Status: SHIPPED | OUTPATIENT
Start: 2024-04-29 | End: 2024-08-21

## 2024-05-08 ENCOUNTER — MYC REFILL (OUTPATIENT)
Dept: PSYCHIATRY | Facility: CLINIC | Age: 24
End: 2024-05-08

## 2024-05-08 DIAGNOSIS — F60.3 BORDERLINE PERSONALITY DISORDER (H): ICD-10-CM

## 2024-05-08 RX ORDER — SERTRALINE HYDROCHLORIDE 100 MG/1
200 TABLET, FILM COATED ORAL DAILY
Qty: 60 TABLET | Refills: 0 | Status: SHIPPED | OUTPATIENT
Start: 2024-05-08 | End: 2024-06-07

## 2024-05-08 NOTE — TELEPHONE ENCOUNTER
1) RN received refill a request(s) via Right Fax from [id number] - [place], [place] - [address] for: sertraline (ZOLOFT) 100 MG tablet - Take 2 tablets (200 mg) by mouth daily       Date of Last Office Visit: 1/11/2024  Date of Next Office Visit: None; routing for [facility] to assist pt with scheduling.    No shows since last visit: No  More than 2 cancellations since last visit? No, only 1 on 3/8/2024      Medication requested:  sertraline (ZOLOFT) 100 MG tablet  Date last ordered: 1/11/2024 Qty: 60 Refills: 1      Controlled Substance(s)? No    Lapse in medication adherence greater than 5 days?: Unknown.   Call patient and gather details: RN attempted to call the patient at [phone] but there was no answer and no voicemail.  RN also sent the patient a MyC message to inquire.     Per pharmacy [id number] was last filled on 3/20/2024    Medication refill request verified as identical to current order?: Yes    Medication requires lab monitoring? No    Last visit treatment plan:   ASSESSMENT AND PLAN        Problem List as of 1/11/2024 Reviewed: 11/15/2023  3:01 PM by Carolin Larkin DNP              Noted             Behavioral     Last System Assessment & Plan 1/11/2024 Virtual Visit Written 1/11/2024 11:36 AM by Carolin Larkin DNP       Overall doing well on sertraline 200 mg  does note sexual side effects but would like to continue this dose due to the efficacy.  ADHD testing next week  will follow-up after she gets results in early March 1. Borderline personality disorder (H) - Primary 3/10/2023       Relevant Medications       sertraline (ZOLOFT) 100 MG tablet       propranolol (INDERAL) 20 MG tablet     2. Gender dysphoria 4/7/2023     3. PTSD (post-traumatic stress disorder) 4/7/2023       Relevant Medications       sertraline (ZOLOFT) 100 MG tablet        []Medication refilled per  Medication Refill in Ambulatory Care  policy.  [x]Medication unable to be refilled by RN due to  criteria not met as indicated below:    []Eligibility - has not had a provider visit within last 6 months   [x]Supervision - no future appointment   [x]Compliance - no shows, cancellations, or lapse in therapy   []Verification - order discrepancy, or needs modified sig.    []90-day supply request   []Advanced refill request   []Controlled medication   []Medication not included in policy   []Review - new medication, or med adjustment, within last 30 days    []Other      A 30-day refill is pended for provider review.    Delores Lewis RN on 5/8/2024 at 1:36 PM

## 2024-06-07 ENCOUNTER — VIRTUAL VISIT (OUTPATIENT)
Dept: PSYCHIATRY | Facility: CLINIC | Age: 24
End: 2024-06-07

## 2024-06-07 DIAGNOSIS — F90.0 ADHD (ATTENTION DEFICIT HYPERACTIVITY DISORDER), INATTENTIVE TYPE: Primary | ICD-10-CM

## 2024-06-07 DIAGNOSIS — F60.3 BORDERLINE PERSONALITY DISORDER (H): ICD-10-CM

## 2024-06-07 PROBLEM — F31.61 BIPOLAR DISORDER, CURRENT EPISODE MIXED, MILD (H): Status: RESOLVED | Noted: 2023-02-17 | Resolved: 2024-06-07

## 2024-06-07 PROCEDURE — 99214 OFFICE O/P EST MOD 30 MIN: CPT | Mod: 95 | Performed by: NURSE PRACTITIONER

## 2024-06-07 RX ORDER — SERTRALINE HYDROCHLORIDE 100 MG/1
200 TABLET, FILM COATED ORAL DAILY
Qty: 60 TABLET | Refills: 1 | Status: SHIPPED | OUTPATIENT
Start: 2024-06-07

## 2024-06-07 RX ORDER — BUPROPION HYDROCHLORIDE 150 MG/1
150 TABLET ORAL EVERY MORNING
Qty: 30 TABLET | Refills: 1 | Status: SHIPPED | OUTPATIENT
Start: 2024-06-07

## 2024-06-07 NOTE — NURSING NOTE
Is the patient currently in the state of [place]? YES    Visit mode:VIDEO    If the visit is dropped, the patient can be reconnected by: VIDEO VISIT: Text to cell phone:   Telephone Information:   Mobile [phone]       Will anyone else be joining the visit? No  (If patient encounters technical issues they should call [phone])    How would you like to obtain your AVS? MyChart    Are changes needed to the allergy or medication list? No    Rooming Documentation: Patient will complete qnrs in mychart.    Reason for visit: ALIYA Omer
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
## 2024-06-07 NOTE — Clinical Note
Please schedule to establish with Bogdan Thomson PA-C, [facility] for long term psychiatry.  Patient agrees and should be approximately six weeks out.  I could not pull in the [facility] [place] [facility]  if you can forward this on.  Thank you!   Carolin Larkin, DEVAN, APRN, FMHNP-BC,

## 2024-06-07 NOTE — ASSESSMENT & PLAN NOTE
Anxiety is stable on the sertraline however continues with residual depressive symptoms.  In addition inattention and lack of focus.   When history is assessed, there is evidence of early-appearing and long-standing problems with attention or self-control.  At this time will start bupropion XL at 150 mg targeting both depression and ADHD symptoms.  Continue the sertraline.  They are interested in transitioning their psychiatric care to the sexual and gender health clinic with Bogdan Thomson.  Will help facilitate this transition

## 2024-06-07 NOTE — PATIENT INSTRUCTIONS
Continue sertraline at 200 mg and add bupropion  mg.  Will transition psychiatric care to the [facility] clinic with Bogdan Thomson they will call to get you scheduled for an intake

## 2024-06-12 ENCOUNTER — VIRTUAL VISIT (OUTPATIENT)
Dept: PSYCHOLOGY | Facility: CLINIC | Age: 24
End: 2024-06-12
Payer: COMMERCIAL

## 2024-06-12 DIAGNOSIS — Z79.899 TRANSGENDER PERSON ON HORMONE THERAPY: ICD-10-CM

## 2024-06-12 DIAGNOSIS — F64.0 TRANSGENDER PERSON ON HORMONE THERAPY: ICD-10-CM

## 2024-06-12 DIAGNOSIS — F41.1 GENERALIZED ANXIETY DISORDER: ICD-10-CM

## 2024-06-12 DIAGNOSIS — F60.3 BORDERLINE PERSONALITY DISORDER (H): ICD-10-CM

## 2024-06-12 DIAGNOSIS — F33.1 MAJOR DEPRESSIVE DISORDER, RECURRENT EPISODE, MODERATE (H): Primary | ICD-10-CM

## 2024-06-12 DIAGNOSIS — F25.1 SCHIZOAFFECTIVE DISORDER, DEPRESSIVE TYPE (H): ICD-10-CM

## 2024-06-12 DIAGNOSIS — F43.10 PTSD (POST-TRAUMATIC STRESS DISORDER): ICD-10-CM

## 2024-06-12 PROCEDURE — 90837 PSYTX W PT 60 MINUTES: CPT | Mod: 95 | Performed by: SOCIAL WORKER

## 2024-06-14 ASSESSMENT — COLUMBIA-SUICIDE SEVERITY RATING SCALE - C-SSRS
2. HAVE YOU ACTUALLY HAD ANY THOUGHTS OF KILLING YOURSELF?: NO
2. HAVE YOU ACTUALLY HAD ANY THOUGHTS OF KILLING YOURSELF?: YES
5. HAVE YOU STARTED TO WORK OUT OR WORKED OUT THE DETAILS OF HOW TO KILL YOURSELF? DO YOU INTEND TO CARRY OUT THIS PLAN?: NO
3. HAVE YOU BEEN THINKING ABOUT HOW YOU MIGHT KILL YOURSELF?: NO
4. HAVE YOU HAD THESE THOUGHTS AND HAD SOME INTENTION OF ACTING ON THEM?: NO
1. HAVE YOU WISHED YOU WERE DEAD OR WISHED YOU COULD GO TO SLEEP AND NOT WAKE UP?: NO
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
## 2024-06-25 ENCOUNTER — TELEPHONE (OUTPATIENT)
Dept: PSYCHIATRY | Facility: CLINIC | Age: 24
End: 2024-06-25

## 2024-06-25 NOTE — TELEPHONE ENCOUNTER
[facility] to [facility] from [facility]. Per note from [facility], pt to schedule in about 6 weeks (late July-early August). LVM for pt to call back to schedule.    Krystina De Leon on 6/25/2024 at 8:10 AM

## 2024-06-26 ENCOUNTER — TELEPHONE (OUTPATIENT)
Dept: PSYCHIATRY | Facility: CLINIC | Age: 24
End: 2024-06-26

## 2024-06-26 NOTE — TELEPHONE ENCOUNTER
RN called pt + left DVM reminding them to complete assigned assessments via [facility] prior to visit or arrive at least 15 minutes early to complete them in the clinic prior to visit start time.    Fela Middleton RN on 6/26/2024 at 3:24 PM

## 2024-06-27 ENCOUNTER — OFFICE VISIT (OUTPATIENT)
Dept: PSYCHIATRY | Facility: CLINIC | Age: 24
End: 2024-06-27

## 2024-06-27 VITALS
DIASTOLIC BLOOD PRESSURE: 85 MMHG | HEART RATE: 84 BPM | SYSTOLIC BLOOD PRESSURE: 128 MMHG | HEIGHT: 68 IN | BODY MASS INDEX: 34.83 KG/M2 | WEIGHT: 229.8 LBS

## 2024-06-27 DIAGNOSIS — F41.1 GENERALIZED ANXIETY DISORDER: ICD-10-CM

## 2024-06-27 DIAGNOSIS — F64.9 GENDER DYSPHORIA: ICD-10-CM

## 2024-06-27 DIAGNOSIS — Z87.820 HX OF TRAUMATIC BRAIN INJURY: ICD-10-CM

## 2024-06-27 DIAGNOSIS — F33.1 MAJOR DEPRESSIVE DISORDER, RECURRENT EPISODE, MODERATE (H): ICD-10-CM

## 2024-06-27 DIAGNOSIS — F60.3 BORDERLINE PERSONALITY DISORDER (H): ICD-10-CM

## 2024-06-27 DIAGNOSIS — F90.0 ADHD (ATTENTION DEFICIT HYPERACTIVITY DISORDER), INATTENTIVE TYPE: ICD-10-CM

## 2024-06-27 DIAGNOSIS — F43.10 PTSD (POST-TRAUMATIC STRESS DISORDER): ICD-10-CM

## 2024-06-27 DIAGNOSIS — F25.1 SCHIZOAFFECTIVE DISORDER, DEPRESSIVE TYPE (H): Primary | ICD-10-CM

## 2024-06-27 PROCEDURE — 99214 OFFICE O/P EST MOD 30 MIN: CPT | Performed by: STUDENT IN AN ORGANIZED HEALTH CARE EDUCATION/TRAINING PROGRAM

## 2024-06-27 ASSESSMENT — PATIENT HEALTH QUESTIONNAIRE - PHQ9
10. IF YOU CHECKED OFF ANY PROBLEMS, HOW DIFFICULT HAVE THESE PROBLEMS MADE IT FOR YOU TO DO YOUR WORK, TAKE CARE OF THINGS AT HOME, OR GET ALONG WITH OTHER PEOPLE: VERY DIFFICULT
SUM OF ALL RESPONSES TO PHQ QUESTIONS 1-9: 14
SUM OF ALL RESPONSES TO PHQ QUESTIONS 1-9: 14
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
## 2024-07-13 ENCOUNTER — HEALTH MAINTENANCE LETTER (OUTPATIENT)
Age: 24
End: 2024-07-13

## 2024-08-14 ENCOUNTER — VIRTUAL VISIT (OUTPATIENT)
Dept: PSYCHOLOGY | Facility: CLINIC | Age: 24
End: 2024-08-14

## 2024-08-14 DIAGNOSIS — F43.10 PTSD (POST-TRAUMATIC STRESS DISORDER): ICD-10-CM

## 2024-08-14 DIAGNOSIS — F60.3 BORDERLINE PERSONALITY DISORDER (H): ICD-10-CM

## 2024-08-14 DIAGNOSIS — F64.0 TRANSGENDER PERSON ON HORMONE THERAPY: ICD-10-CM

## 2024-08-14 DIAGNOSIS — F33.1 MAJOR DEPRESSIVE DISORDER, RECURRENT EPISODE, MODERATE (H): Primary | ICD-10-CM

## 2024-08-14 DIAGNOSIS — F41.1 GENERALIZED ANXIETY DISORDER: ICD-10-CM

## 2024-08-14 DIAGNOSIS — Z79.899 TRANSGENDER PERSON ON HORMONE THERAPY: ICD-10-CM

## 2024-08-14 DIAGNOSIS — F25.1 SCHIZOAFFECTIVE DISORDER, DEPRESSIVE TYPE (H): ICD-10-CM

## 2024-08-14 PROCEDURE — 90837 PSYTX W PT 60 MINUTES: CPT | Mod: 95 | Performed by: SOCIAL WORKER

## 2024-08-14 ASSESSMENT — ANXIETY QUESTIONNAIRES
3. WORRYING TOO MUCH ABOUT DIFFERENT THINGS: SEVERAL DAYS
6. BECOMING EASILY ANNOYED OR IRRITABLE: SEVERAL DAYS
1. FEELING NERVOUS, ANXIOUS, OR ON EDGE: MORE THAN HALF THE DAYS
7. FEELING AFRAID AS IF SOMETHING AWFUL MIGHT HAPPEN: SEVERAL DAYS
GAD7 TOTAL SCORE: 9
IF YOU CHECKED OFF ANY PROBLEMS ON THIS QUESTIONNAIRE, HOW DIFFICULT HAVE THESE PROBLEMS MADE IT FOR YOU TO DO YOUR WORK, TAKE CARE OF THINGS AT HOME, OR GET ALONG WITH OTHER PEOPLE: SOMEWHAT DIFFICULT
8. IF YOU CHECKED OFF ANY PROBLEMS, HOW DIFFICULT HAVE THESE MADE IT FOR YOU TO DO YOUR WORK, TAKE CARE OF THINGS AT HOME, OR GET ALONG WITH OTHER PEOPLE?: SOMEWHAT DIFFICULT
2. NOT BEING ABLE TO STOP OR CONTROL WORRYING: MORE THAN HALF THE DAYS
4. TROUBLE RELAXING: SEVERAL DAYS
GAD7 TOTAL SCORE: 9
GAD7 TOTAL SCORE: 9
5. BEING SO RESTLESS THAT IT IS HARD TO SIT STILL: SEVERAL DAYS
7. FEELING AFRAID AS IF SOMETHING AWFUL MIGHT HAPPEN: SEVERAL DAYS

## 2024-08-14 ASSESSMENT — PATIENT HEALTH QUESTIONNAIRE - PHQ9
SUM OF ALL RESPONSES TO PHQ QUESTIONS 1-9: 10
SUM OF ALL RESPONSES TO PHQ QUESTIONS 1-9: 10
10. IF YOU CHECKED OFF ANY PROBLEMS, HOW DIFFICULT HAVE THESE PROBLEMS MADE IT FOR YOU TO DO YOUR WORK, TAKE CARE OF THINGS AT HOME, OR GET ALONG WITH OTHER PEOPLE: SOMEWHAT DIFFICULT
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
## 2024-08-20 DIAGNOSIS — R06.2 WHEEZING: ICD-10-CM

## 2024-08-21 RX ORDER — ALBUTEROL SULFATE 90 UG/1
AEROSOL, METERED RESPIRATORY (INHALATION)
Qty: 8.5 G | Refills: 0 | Status: SHIPPED | OUTPATIENT
Start: 2024-08-21

## 2024-10-18 DIAGNOSIS — R06.2 WHEEZING: ICD-10-CM

## 2024-10-18 DIAGNOSIS — F60.3 BORDERLINE PERSONALITY DISORDER (H): ICD-10-CM

## 2024-10-18 RX ORDER — ALBUTEROL SULFATE 90 UG/1
INHALANT RESPIRATORY (INHALATION)
Qty: 8.5 G | Refills: 0 | Status: SHIPPED | OUTPATIENT
Start: 2024-10-18 | End: 2024-11-13

## 2024-10-25 RX ORDER — SERTRALINE HYDROCHLORIDE 100 MG/1
200 TABLET, FILM COATED ORAL DAILY
Qty: 60 TABLET | Refills: 0 | Status: SHIPPED | OUTPATIENT
Start: 2024-10-25

## 2024-11-13 DIAGNOSIS — R06.2 WHEEZING: ICD-10-CM

## 2024-11-13 RX ORDER — ALBUTEROL SULFATE 90 UG/1
INHALANT RESPIRATORY (INHALATION)
Qty: 8.5 G | Refills: 0 | Status: SHIPPED | OUTPATIENT
Start: 2024-11-13

## 2025-02-04 ENCOUNTER — VIRTUAL VISIT (OUTPATIENT)
Dept: PSYCHOLOGY | Facility: CLINIC | Age: 25
End: 2025-02-04
Payer: COMMERCIAL

## 2025-02-04 ENCOUNTER — ANCILLARY PROCEDURE (OUTPATIENT)
Dept: GENERAL RADIOLOGY | Facility: CLINIC | Age: 25
End: 2025-02-04
Attending: PHYSICIAN ASSISTANT
Payer: COMMERCIAL

## 2025-02-04 ENCOUNTER — OFFICE VISIT (OUTPATIENT)
Dept: URGENT CARE | Facility: URGENT CARE | Age: 25
End: 2025-02-04
Payer: COMMERCIAL

## 2025-02-04 VITALS
WEIGHT: 239 LBS | DIASTOLIC BLOOD PRESSURE: 80 MMHG | RESPIRATION RATE: 16 BRPM | BODY MASS INDEX: 36.22 KG/M2 | SYSTOLIC BLOOD PRESSURE: 118 MMHG | TEMPERATURE: 97.7 F | HEART RATE: 63 BPM | OXYGEN SATURATION: 98 % | HEIGHT: 68 IN

## 2025-02-04 DIAGNOSIS — F25.1 SCHIZOAFFECTIVE DISORDER, DEPRESSIVE TYPE (H): ICD-10-CM

## 2025-02-04 DIAGNOSIS — Z79.899 TRANSGENDER PERSON ON HORMONE THERAPY: ICD-10-CM

## 2025-02-04 DIAGNOSIS — F60.3 BORDERLINE PERSONALITY DISORDER (H): ICD-10-CM

## 2025-02-04 DIAGNOSIS — F32.A DEPRESSION, UNSPECIFIED DEPRESSION TYPE: ICD-10-CM

## 2025-02-04 DIAGNOSIS — M54.50 ACUTE BILATERAL LOW BACK PAIN WITHOUT SCIATICA: Primary | ICD-10-CM

## 2025-02-04 DIAGNOSIS — F41.1 GENERALIZED ANXIETY DISORDER: ICD-10-CM

## 2025-02-04 DIAGNOSIS — M54.50 ACUTE BILATERAL LOW BACK PAIN WITHOUT SCIATICA: ICD-10-CM

## 2025-02-04 DIAGNOSIS — F43.10 PTSD (POST-TRAUMATIC STRESS DISORDER): ICD-10-CM

## 2025-02-04 DIAGNOSIS — F33.1 MAJOR DEPRESSIVE DISORDER, RECURRENT EPISODE, MODERATE (H): Primary | ICD-10-CM

## 2025-02-04 DIAGNOSIS — F64.0 TRANSGENDER PERSON ON HORMONE THERAPY: ICD-10-CM

## 2025-02-04 PROCEDURE — 3079F DIAST BP 80-89 MM HG: CPT | Performed by: PHYSICIAN ASSISTANT

## 2025-02-04 PROCEDURE — 90785 PSYTX COMPLEX INTERACTIVE: CPT | Mod: 95 | Performed by: SOCIAL WORKER

## 2025-02-04 PROCEDURE — 1125F AMNT PAIN NOTED PAIN PRSNT: CPT | Performed by: PHYSICIAN ASSISTANT

## 2025-02-04 PROCEDURE — 99214 OFFICE O/P EST MOD 30 MIN: CPT | Performed by: PHYSICIAN ASSISTANT

## 2025-02-04 PROCEDURE — 3074F SYST BP LT 130 MM HG: CPT | Performed by: PHYSICIAN ASSISTANT

## 2025-02-04 PROCEDURE — 72100 X-RAY EXAM L-S SPINE 2/3 VWS: CPT | Mod: TC | Performed by: RADIOLOGY

## 2025-02-04 PROCEDURE — 90837 PSYTX W PT 60 MINUTES: CPT | Mod: 95 | Performed by: SOCIAL WORKER

## 2025-02-04 RX ORDER — PREDNISONE 20 MG/1
TABLET ORAL
Qty: 20 TABLET | Refills: 0 | Status: SHIPPED | OUTPATIENT
Start: 2025-02-04

## 2025-02-04 RX ORDER — CYCLOBENZAPRINE HCL 10 MG
10 TABLET ORAL 3 TIMES DAILY PRN
Qty: 30 TABLET | Refills: 0 | Status: SHIPPED | OUTPATIENT
Start: 2025-02-04 | End: 2025-02-14

## 2025-02-04 ASSESSMENT — PAIN SCALES - GENERAL: PAINLEVEL_OUTOF10: SEVERE PAIN (8)

## 2025-02-04 NOTE — PROGRESS NOTES
SUBJECTIVE  HPI: Shiloh Cheatham is a 24 year old adult who presents for evaluation of back pain  Symptoms began {NUMBERS 1-12:389650} {TIME FRAME:} ago, have been {PAIN TIMIN} and are {BETTER:584791}.  Pain is located in the {LOCATION:270962} region, with radiation to {BACK PAIN RADIATION:471086}, and are at worst a {:517168} on a scale of 1-10.  Recent injury:{:078970}  Personal hx of back pain is {BACK PAIN PRIOR HX:5285::recurrent self limited episodes of low back pain in the past}.  Pain is exacerbated by: {BACK PAIN PROVOCATIVE MEASURES:135635}.  Pain is relieved by: {BACK PAIN PALLIATIVE MEASURES:653958}[unfilled] sx include: {ASSOCIATIONS:292878}.  Red flag symptoms: {GENERAL:458990::negative}.    No past medical history on file.  Current Outpatient Medications   Medication Sig Dispense Refill    albuterol (PROAIR HFA/PROVENTIL HFA/VENTOLIN HFA) 108 (90 Base) MCG/ACT inhaler INHALE 2 PUFFS BY MOUTH EVERY SIX HOURS AS NEEDED FOR SHORTNESS OF BREATH, WHEEZING, OR COUGH (Patient not taking: Reported on 2025) 8.5 g 0    brexpiprazole (REXULTI) 0.5 MG tablet Take 2 tablets (1 mg) by mouth daily (Patient not taking: Reported on 2025) 30 tablet 0    buPROPion (WELLBUTRIN XL) 150 MG 24 hr tablet Take 1 tablet (150 mg) by mouth every morning 30 tablet 1    propranolol (INDERAL) 20 MG tablet Take 1 tablet (20 mg) by mouth 2 times daily as needed (anxiety) 60 tablet 1    sertraline (ZOLOFT) 100 MG tablet take 2 tablets by mouth daily (Patient not taking: Reported on 2025) 60 tablet 0    testosterone (ANDROGEL 1.62 % PUMP) 20.25 MG/ACT gel Place 40.5 mg onto the skin daily Apply from dispenser to clean, dry, intact skin of the upper arms and shoulders. (Patient not taking: Reported on 2025) 150 g 1     Social History     Tobacco Use    Smoking status: Never    Smokeless tobacco: Never   Substance Use Topics    Alcohol use: No     Comment: occ       ROS:  {ROS  "SHORT:168578}    OBJECTIVE:  /80   Pulse 63   Temp 97.7  F (36.5  C) (Temporal)   Resp 16   Ht 1.727 m (5' 8\")   Wt 108.4 kg (239 lb)   SpO2 98%   BMI 36.34 kg/m    Back examination: {BACK EXAM:801::Back symmetric, no curvature. ROM normal. No CVA tenderness.}  [unfilled] leg raise test: {POSITIVE:126049}  { EXAM FAST BACK:645952}    ASSESSMENT/IMPRESSION:  { Back Diagnosis:230530}    PLAN:1) PLEASE SEE ORDER SUMMARY  Education Documentation  No documentation found.  Education Comments  No comments found.    :      1.  Continue stretching and strengthening exercises.       2.  Continue prn heat or ice application.    "

## 2025-02-05 ASSESSMENT — COLUMBIA-SUICIDE SEVERITY RATING SCALE - C-SSRS
1. HAVE YOU WISHED YOU WERE DEAD OR WISHED YOU COULD GO TO SLEEP AND NOT WAKE UP?: YES
2. HAVE YOU ACTUALLY HAD ANY THOUGHTS OF KILLING YOURSELF?: NO
1. IN THE PAST MONTH, HAVE YOU WISHED YOU WERE DEAD OR WISHED YOU COULD GO TO SLEEP AND NOT WAKE UP?: NO

## 2025-02-05 NOTE — PATIENT INSTRUCTIONS
Will go to Urgent care today for back pain   Will be scheduling with medical team to get back on track with medications and medical care.  Returning to therapy
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
## 2025-02-18 ENCOUNTER — VIRTUAL VISIT (OUTPATIENT)
Dept: PSYCHOLOGY | Facility: CLINIC | Age: 25
End: 2025-02-18
Payer: COMMERCIAL

## 2025-02-18 DIAGNOSIS — F64.0 TRANSGENDER PERSON ON HORMONE THERAPY: ICD-10-CM

## 2025-02-18 DIAGNOSIS — Z79.899 TRANSGENDER PERSON ON HORMONE THERAPY: ICD-10-CM

## 2025-02-18 DIAGNOSIS — F33.1 MAJOR DEPRESSIVE DISORDER, RECURRENT EPISODE, MODERATE (H): Primary | ICD-10-CM

## 2025-02-18 DIAGNOSIS — F43.10 PTSD (POST-TRAUMATIC STRESS DISORDER): ICD-10-CM

## 2025-02-18 DIAGNOSIS — F41.1 GENERALIZED ANXIETY DISORDER: ICD-10-CM

## 2025-02-18 DIAGNOSIS — F60.3 BORDERLINE PERSONALITY DISORDER (H): ICD-10-CM

## 2025-02-18 DIAGNOSIS — F25.1 SCHIZOAFFECTIVE DISORDER, DEPRESSIVE TYPE (H): ICD-10-CM

## 2025-02-18 PROCEDURE — 90837 PSYTX W PT 60 MINUTES: CPT | Mod: 95 | Performed by: SOCIAL WORKER

## 2025-02-18 ASSESSMENT — PATIENT HEALTH QUESTIONNAIRE - PHQ9
SUM OF ALL RESPONSES TO PHQ QUESTIONS 1-9: 18
SUM OF ALL RESPONSES TO PHQ QUESTIONS 1-9: 18
10. IF YOU CHECKED OFF ANY PROBLEMS, HOW DIFFICULT HAVE THESE PROBLEMS MADE IT FOR YOU TO DO YOUR WORK, TAKE CARE OF THINGS AT HOME, OR GET ALONG WITH OTHER PEOPLE: VERY DIFFICULT

## 2025-02-18 ASSESSMENT — ANXIETY QUESTIONNAIRES
8. IF YOU CHECKED OFF ANY PROBLEMS, HOW DIFFICULT HAVE THESE MADE IT FOR YOU TO DO YOUR WORK, TAKE CARE OF THINGS AT HOME, OR GET ALONG WITH OTHER PEOPLE?: VERY DIFFICULT
2. NOT BEING ABLE TO STOP OR CONTROL WORRYING: NEARLY EVERY DAY
IF YOU CHECKED OFF ANY PROBLEMS ON THIS QUESTIONNAIRE, HOW DIFFICULT HAVE THESE PROBLEMS MADE IT FOR YOU TO DO YOUR WORK, TAKE CARE OF THINGS AT HOME, OR GET ALONG WITH OTHER PEOPLE: VERY DIFFICULT
7. FEELING AFRAID AS IF SOMETHING AWFUL MIGHT HAPPEN: SEVERAL DAYS
7. FEELING AFRAID AS IF SOMETHING AWFUL MIGHT HAPPEN: SEVERAL DAYS
3. WORRYING TOO MUCH ABOUT DIFFERENT THINGS: NEARLY EVERY DAY
GAD7 TOTAL SCORE: 16
4. TROUBLE RELAXING: SEVERAL DAYS
GAD7 TOTAL SCORE: 16
6. BECOMING EASILY ANNOYED OR IRRITABLE: NEARLY EVERY DAY
1. FEELING NERVOUS, ANXIOUS, OR ON EDGE: NEARLY EVERY DAY
GAD7 TOTAL SCORE: 16
5. BEING SO RESTLESS THAT IT IS HARD TO SIT STILL: MORE THAN HALF THE DAYS

## 2025-02-20 NOTE — PATIENT INSTRUCTIONS
Continuing to check out back pain  Boundaries at work  Managing relationship with parents  Preparing for move
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
## 2025-07-19 ENCOUNTER — HEALTH MAINTENANCE LETTER (OUTPATIENT)
Age: 25
End: 2025-07-19

## 2025-08-12 DIAGNOSIS — R06.2 WHEEZING: ICD-10-CM

## 2025-08-12 RX ORDER — ALBUTEROL SULFATE 90 UG/1
INHALANT RESPIRATORY (INHALATION)
Qty: 8.5 G | Refills: 0 | Status: SHIPPED | OUTPATIENT
Start: 2025-08-12

## (undated) DEVICE — SYR 10ML FINGER CONTROL W/O NDL 309695

## (undated) DEVICE — SU MONOCRYL 3-0 PS-1 27" Y936H

## (undated) DEVICE — DRAPE STERI U 1015

## (undated) DEVICE — GLOVE BIOGEL PI SZ 7.5 40875

## (undated) DEVICE — DRAPE MAYO STAND 23X54 8337

## (undated) DEVICE — ABLATOR ARTHREX APOLLO RF MP90 ASPIRATING 90DEG AR-9811

## (undated) DEVICE — KIT ARTHREX PUSHLOCK SHORT 2.9MM DISP AR-2923DS

## (undated) DEVICE — TUBING SET ARTHREX DUALWAVE OUTFLOW AR-6430

## (undated) DEVICE — PREP DURAPREP 26ML APL 8630

## (undated) DEVICE — SUCTION MANIFOLD NEPTUNE 2 SYS 4 PORT 0702-020-000

## (undated) DEVICE — NDL SPINAL 18GA 3.5" 405184

## (undated) DEVICE — TUBING SYSTEM ARTHREX PUMP REDEUCE AR-6411

## (undated) DEVICE — GLOVE PROTEXIS POWDER FREE SMT 7.0  2D72PT70X

## (undated) DEVICE — LINEN ORTHO PACK 5446

## (undated) DEVICE — DEVICE PASSER LASSO 25DEG CVD LT AR-6068-25TL

## (undated) DEVICE — TUBING SYSTEM ARTHREX PATIENT REDEUCE AR-6421

## (undated) DEVICE — DRAPE U-POUCH 34X29" 1067

## (undated) DEVICE — ARTHROSCOPIC CANNULA TWIST-IN PURPLE 7MMX7CM AR-6570

## (undated) DEVICE — PACK OPEN SHOULDER CUSTOM ASC

## (undated) DEVICE — SU LABRAL TAPE WHITE 1.5MM 36" AR-7276

## (undated) DEVICE — TUBING SUCTION MEDI-VAC 1/4"X20' N620A

## (undated) DEVICE — SOL NACL 0.9% IRRIG 3000ML BAG 2B7477

## (undated) DEVICE — BUR ARTHREX COOLCUT TORPEDO 4.0MMX13CM AR-8400TD

## (undated) DEVICE — BRUSH SURGICAL SCRUB W/4% CHG SOL 25ML 371073

## (undated) DEVICE — DRSG STERI STRIP 1/2X4" R1547

## (undated) DEVICE — DEVICE PASSER LASSO 25DEG CVD RT AR-6068-25TR

## (undated) RX ORDER — FENTANYL CITRATE 50 UG/ML
INJECTION, SOLUTION INTRAMUSCULAR; INTRAVENOUS
Status: DISPENSED
Start: 2019-03-29

## (undated) RX ORDER — ACETAMINOPHEN 325 MG/1
TABLET ORAL
Status: DISPENSED
Start: 2019-03-29

## (undated) RX ORDER — BUPIVACAINE HYDROCHLORIDE 2.5 MG/ML
INJECTION, SOLUTION EPIDURAL; INFILTRATION; INTRACAUDAL
Status: DISPENSED
Start: 2019-03-29

## (undated) RX ORDER — TRIAMCINOLONE ACETONIDE 40 MG/ML
INJECTION, SUSPENSION INTRA-ARTICULAR; INTRAMUSCULAR
Status: DISPENSED
Start: 2017-11-07

## (undated) RX ORDER — PROPOFOL 10 MG/ML
INJECTION, EMULSION INTRAVENOUS
Status: DISPENSED
Start: 2019-03-29

## (undated) RX ORDER — EPINEPHRINE NASAL SOLUTION 1 MG/ML
SOLUTION NASAL
Status: DISPENSED
Start: 2019-03-29

## (undated) RX ORDER — DEXAMETHASONE SODIUM PHOSPHATE 4 MG/ML
INJECTION, SOLUTION INTRA-ARTICULAR; INTRALESIONAL; INTRAMUSCULAR; INTRAVENOUS; SOFT TISSUE
Status: DISPENSED
Start: 2019-03-29

## (undated) RX ORDER — ONDANSETRON 2 MG/ML
INJECTION INTRAMUSCULAR; INTRAVENOUS
Status: DISPENSED
Start: 2019-03-29

## (undated) RX ORDER — KETOROLAC TROMETHAMINE 30 MG/ML
INJECTION, SOLUTION INTRAMUSCULAR; INTRAVENOUS
Status: DISPENSED
Start: 2019-03-29

## (undated) RX ORDER — LIDOCAINE HYDROCHLORIDE 20 MG/ML
INJECTION, SOLUTION EPIDURAL; INFILTRATION; INTRACAUDAL; PERINEURAL
Status: DISPENSED
Start: 2019-03-29

## (undated) RX ORDER — GABAPENTIN 300 MG/1
CAPSULE ORAL
Status: DISPENSED
Start: 2019-03-29

## (undated) RX ORDER — CEFAZOLIN SODIUM 1 G/3ML
INJECTION, POWDER, FOR SOLUTION INTRAMUSCULAR; INTRAVENOUS
Status: DISPENSED
Start: 2019-03-29

## (undated) RX ORDER — LIDOCAINE HYDROCHLORIDE 10 MG/ML
INJECTION, SOLUTION INFILTRATION; PERINEURAL
Status: DISPENSED
Start: 2017-11-07